# Patient Record
Sex: FEMALE | Race: WHITE | Employment: OTHER | URBAN - METROPOLITAN AREA
[De-identification: names, ages, dates, MRNs, and addresses within clinical notes are randomized per-mention and may not be internally consistent; named-entity substitution may affect disease eponyms.]

---

## 2017-01-17 ENCOUNTER — ANTI-COAG VISIT (OUTPATIENT)
Dept: PHARMACY | Age: 72
End: 2017-01-17

## 2017-01-17 DIAGNOSIS — Z79.01 CHRONIC ANTICOAGULATION: ICD-10-CM

## 2017-01-17 DIAGNOSIS — I48.91 ATRIAL FIBRILLATION, UNSPECIFIED TYPE (HCC): ICD-10-CM

## 2017-01-17 LAB — INR BLD: 2.2

## 2017-01-18 ENCOUNTER — NURSE ONLY (OUTPATIENT)
Dept: CARDIOLOGY CLINIC | Age: 72
End: 2017-01-18

## 2017-01-18 DIAGNOSIS — I50.22 SYSTOLIC CHF, CHRONIC (HCC): Chronic | ICD-10-CM

## 2017-01-18 DIAGNOSIS — Z95.810 AUTOMATIC IMPLANTABLE CARDIOVERTER-DEFIBRILLATOR IN SITU: ICD-10-CM

## 2017-01-18 PROCEDURE — 93297 REM INTERROG DEV EVAL ICPMS: CPT | Performed by: INTERNAL MEDICINE

## 2017-01-18 PROCEDURE — 93296 REM INTERROG EVL PM/IDS: CPT | Performed by: INTERNAL MEDICINE

## 2017-01-18 PROCEDURE — 93295 DEV INTERROG REMOTE 1/2/MLT: CPT | Performed by: INTERNAL MEDICINE

## 2017-02-21 ENCOUNTER — OFFICE VISIT (OUTPATIENT)
Dept: NEUROLOGY | Age: 72
End: 2017-02-21

## 2017-02-21 VITALS
BODY MASS INDEX: 31.86 KG/M2 | HEART RATE: 76 BPM | WEIGHT: 203 LBS | HEIGHT: 67 IN | DIASTOLIC BLOOD PRESSURE: 76 MMHG | SYSTOLIC BLOOD PRESSURE: 129 MMHG

## 2017-02-21 DIAGNOSIS — G40.209 PARTIAL SYMPTOMATIC EPILEPSY WITH COMPLEX PARTIAL SEIZURES, NOT INTRACTABLE, WITHOUT STATUS EPILEPTICUS (HCC): Primary | ICD-10-CM

## 2017-02-21 DIAGNOSIS — I69.354 HEMIPARESIS AFFECTING LEFT SIDE AS LATE EFFECT OF CEREBROVASCULAR ACCIDENT (HCC): ICD-10-CM

## 2017-02-21 DIAGNOSIS — G47.33 OBSTRUCTIVE SLEEP APNEA: ICD-10-CM

## 2017-02-21 PROCEDURE — 99213 OFFICE O/P EST LOW 20 MIN: CPT | Performed by: PSYCHIATRY & NEUROLOGY

## 2017-02-21 RX ORDER — DIVALPROEX SODIUM 500 MG/1
TABLET, EXTENDED RELEASE ORAL
Qty: 90 TABLET | Refills: 1 | Status: SHIPPED | OUTPATIENT
Start: 2017-02-21 | End: 2017-08-22 | Stop reason: SDUPTHER

## 2017-02-28 ENCOUNTER — ANTI-COAG VISIT (OUTPATIENT)
Dept: PHARMACY | Age: 72
End: 2017-02-28

## 2017-02-28 DIAGNOSIS — Z79.01 CHRONIC ANTICOAGULATION: ICD-10-CM

## 2017-02-28 DIAGNOSIS — I48.91 ATRIAL FIBRILLATION, UNSPECIFIED TYPE (HCC): ICD-10-CM

## 2017-02-28 LAB — INR BLD: 3.2

## 2017-03-14 ENCOUNTER — OFFICE VISIT (OUTPATIENT)
Dept: SLEEP MEDICINE | Age: 72
End: 2017-03-14

## 2017-03-14 VITALS
HEIGHT: 67 IN | OXYGEN SATURATION: 97 % | DIASTOLIC BLOOD PRESSURE: 70 MMHG | SYSTOLIC BLOOD PRESSURE: 114 MMHG | HEART RATE: 78 BPM

## 2017-03-14 DIAGNOSIS — I42.9 CARDIOMYOPATHY, IDIOPATHIC (HCC): Chronic | ICD-10-CM

## 2017-03-14 DIAGNOSIS — E66.9 NON MORBID OBESITY, UNSPECIFIED OBESITY TYPE: Chronic | ICD-10-CM

## 2017-03-14 DIAGNOSIS — G47.33 OBSTRUCTIVE SLEEP APNEA (ADULT) (PEDIATRIC): Primary | Chronic | ICD-10-CM

## 2017-03-14 DIAGNOSIS — F32.A DEPRESSION, UNSPECIFIED DEPRESSION TYPE: Chronic | ICD-10-CM

## 2017-03-14 DIAGNOSIS — G40.209 PARTIAL EPILEPSY WITH IMPAIRMENT OF CONSCIOUSNESS (HCC): Chronic | ICD-10-CM

## 2017-03-14 DIAGNOSIS — I50.22 SYSTOLIC CHF, CHRONIC (HCC): Chronic | ICD-10-CM

## 2017-03-14 PROCEDURE — 99214 OFFICE O/P EST MOD 30 MIN: CPT | Performed by: NURSE PRACTITIONER

## 2017-03-14 ASSESSMENT — ENCOUNTER SYMPTOMS
COUGH: 0
ABDOMINAL PAIN: 0
ABDOMINAL DISTENTION: 0
SINUS PRESSURE: 0
RHINORRHEA: 0
SHORTNESS OF BREATH: 0
APNEA: 0

## 2017-03-14 ASSESSMENT — SLEEP AND FATIGUE QUESTIONNAIRES
HOW LIKELY ARE YOU TO NOD OFF OR FALL ASLEEP IN A CAR, WHILE STOPPED FOR A FEW MINUTES IN TRAFFIC: 0
HOW LIKELY ARE YOU TO NOD OFF OR FALL ASLEEP WHILE SITTING AND TALKING TO SOMEONE: 0
HOW LIKELY ARE YOU TO NOD OFF OR FALL ASLEEP WHILE SITTING INACTIVE IN A PUBLIC PLACE: 0
HOW LIKELY ARE YOU TO NOD OFF OR FALL ASLEEP WHEN YOU ARE A PASSENGER IN A CAR FOR AN HOUR WITHOUT A BREAK: 0
HOW LIKELY ARE YOU TO NOD OFF OR FALL ASLEEP WHILE SITTING QUIETLY AFTER LUNCH WITHOUT ALCOHOL: 0
ESS TOTAL SCORE: 2
HOW LIKELY ARE YOU TO NOD OFF OR FALL ASLEEP WHILE LYING DOWN TO REST IN THE AFTERNOON WHEN CIRCUMSTANCES PERMIT: 1
HOW LIKELY ARE YOU TO NOD OFF OR FALL ASLEEP WHILE SITTING AND READING: 0
HOW LIKELY ARE YOU TO NOD OFF OR FALL ASLEEP WHILE WATCHING TV: 1

## 2017-03-23 RX ORDER — CARVEDILOL 25 MG/1
TABLET ORAL
Qty: 135 TABLET | Refills: 3 | Status: SHIPPED | OUTPATIENT
Start: 2017-03-23 | End: 2018-03-18 | Stop reason: SDUPTHER

## 2017-04-10 ENCOUNTER — OFFICE VISIT (OUTPATIENT)
Dept: CARDIOLOGY CLINIC | Age: 72
End: 2017-04-10

## 2017-04-10 ENCOUNTER — HOSPITAL ENCOUNTER (OUTPATIENT)
Dept: OTHER | Age: 72
Discharge: HOME | End: 2017-04-10
Attending: INTERNAL MEDICINE | Admitting: INTERNAL MEDICINE

## 2017-04-10 ENCOUNTER — ANTI-COAG VISIT (OUTPATIENT)
Dept: PHARMACY | Age: 72
End: 2017-04-10

## 2017-04-10 VITALS
SYSTOLIC BLOOD PRESSURE: 100 MMHG | HEART RATE: 72 BPM | HEIGHT: 67 IN | BODY MASS INDEX: 32.49 KG/M2 | WEIGHT: 207 LBS | DIASTOLIC BLOOD PRESSURE: 64 MMHG

## 2017-04-10 DIAGNOSIS — I50.22 SYSTOLIC CHF, CHRONIC (HCC): Chronic | ICD-10-CM

## 2017-04-10 DIAGNOSIS — Z95.810 AUTOMATIC IMPLANTABLE CARDIOVERTER-DEFIBRILLATOR IN SITU: Chronic | ICD-10-CM

## 2017-04-10 DIAGNOSIS — Z79.01 CHRONIC ANTICOAGULATION: ICD-10-CM

## 2017-04-10 DIAGNOSIS — I42.9 CARDIOMYOPATHY, IDIOPATHIC (HCC): Chronic | ICD-10-CM

## 2017-04-10 DIAGNOSIS — I50.22 SYSTOLIC CHF, CHRONIC (HCC): Primary | Chronic | ICD-10-CM

## 2017-04-10 LAB
ANION GAP SERPL CALCULATED.3IONS-SCNC: 17 MMOL/L (ref 3–16)
BASOPHILS ABSOLUTE: 0 K/UL (ref 0–0.2)
BASOPHILS RELATIVE PERCENT: 0.4 %
BUN BLDV-MCNC: 13 MG/DL (ref 7–20)
CALCIUM SERPL-MCNC: 9.8 MG/DL (ref 8.3–10.6)
CHLORIDE BLD-SCNC: 100 MMOL/L (ref 99–110)
CO2: 22 MMOL/L (ref 21–32)
CREAT SERPL-MCNC: 0.8 MG/DL (ref 0.6–1.2)
EOSINOPHILS ABSOLUTE: 0.1 K/UL (ref 0–0.6)
EOSINOPHILS RELATIVE PERCENT: 1.3 %
GFR AFRICAN AMERICAN: >60
GFR NON-AFRICAN AMERICAN: >60
GLUCOSE BLD-MCNC: 92 MG/DL (ref 70–99)
HCT VFR BLD CALC: 43 % (ref 36–48)
HEMOGLOBIN: 14.7 G/DL (ref 12–16)
INR BLD: 4.1
INR BLD: 4.1
LYMPHOCYTES ABSOLUTE: 3 K/UL (ref 1–5.1)
LYMPHOCYTES RELATIVE PERCENT: 36.9 %
MCH RBC QN AUTO: 35.6 PG (ref 26–34)
MCHC RBC AUTO-ENTMCNC: 34.2 G/DL (ref 31–36)
MCV RBC AUTO: 104.1 FL (ref 80–100)
MONOCYTES ABSOLUTE: 1.1 K/UL (ref 0–1.3)
MONOCYTES RELATIVE PERCENT: 13.7 %
NEUTROPHILS ABSOLUTE: 3.8 K/UL (ref 1.7–7.7)
NEUTROPHILS RELATIVE PERCENT: 47.7 %
PDW BLD-RTO: 13.2 % (ref 12.4–15.4)
PLATELET # BLD: 182 K/UL (ref 135–450)
PMV BLD AUTO: 8.6 FL (ref 5–10.5)
POTASSIUM SERPL-SCNC: 4.6 MMOL/L (ref 3.5–5.1)
PRO-BNP: 717 PG/ML (ref 0–124)
RBC # BLD: 4.13 M/UL (ref 4–5.2)
SODIUM BLD-SCNC: 139 MMOL/L (ref 136–145)
WBC # BLD: 8.1 K/UL (ref 4–11)

## 2017-04-10 PROCEDURE — 99214 OFFICE O/P EST MOD 30 MIN: CPT | Performed by: INTERNAL MEDICINE

## 2017-04-10 PROCEDURE — 93290 INTERROG DEV EVAL ICPMS IP: CPT | Performed by: INTERNAL MEDICINE

## 2017-04-19 ENCOUNTER — NURSE ONLY (OUTPATIENT)
Dept: CARDIOLOGY CLINIC | Age: 72
End: 2017-04-19

## 2017-04-19 DIAGNOSIS — I50.22 SYSTOLIC CHF, CHRONIC (HCC): Chronic | ICD-10-CM

## 2017-04-19 DIAGNOSIS — I42.9 CARDIOMYOPATHY, IDIOPATHIC (HCC): Chronic | ICD-10-CM

## 2017-04-19 DIAGNOSIS — Z95.810 AUTOMATIC IMPLANTABLE CARDIOVERTER-DEFIBRILLATOR IN SITU: Chronic | ICD-10-CM

## 2017-04-19 PROCEDURE — 93296 REM INTERROG EVL PM/IDS: CPT | Performed by: INTERNAL MEDICINE

## 2017-04-19 PROCEDURE — 93297 REM INTERROG DEV EVAL ICPMS: CPT | Performed by: INTERNAL MEDICINE

## 2017-04-19 PROCEDURE — 93295 DEV INTERROG REMOTE 1/2/MLT: CPT | Performed by: INTERNAL MEDICINE

## 2017-04-24 ENCOUNTER — ANTI-COAG VISIT (OUTPATIENT)
Dept: PHARMACY | Age: 72
End: 2017-04-24

## 2017-04-24 DIAGNOSIS — I48.91 ATRIAL FIBRILLATION, UNSPECIFIED TYPE (HCC): ICD-10-CM

## 2017-04-24 DIAGNOSIS — Z79.01 CHRONIC ANTICOAGULATION: ICD-10-CM

## 2017-04-24 DIAGNOSIS — I63.9 CEREBRAL INFARCTION, UNSPECIFIED MECHANISM (HCC): ICD-10-CM

## 2017-04-24 LAB
INR BLD: 2.7
PROTIME: 32.9 SECONDS

## 2017-05-22 ENCOUNTER — ANTI-COAG VISIT (OUTPATIENT)
Dept: PHARMACY | Age: 72
End: 2017-05-22

## 2017-05-22 DIAGNOSIS — I63.9 CEREBRAL INFARCTION, UNSPECIFIED MECHANISM (HCC): ICD-10-CM

## 2017-05-22 DIAGNOSIS — Z79.01 CHRONIC ANTICOAGULATION: ICD-10-CM

## 2017-05-22 DIAGNOSIS — I48.91 ATRIAL FIBRILLATION, UNSPECIFIED TYPE (HCC): ICD-10-CM

## 2017-05-22 LAB
INR BLD: 3.2
PROTIME: 38.2 SECONDS

## 2017-06-05 ENCOUNTER — ANTI-COAG VISIT (OUTPATIENT)
Dept: PHARMACY | Age: 72
End: 2017-06-05

## 2017-06-05 DIAGNOSIS — I63.9 CEREBRAL INFARCTION, UNSPECIFIED MECHANISM (HCC): ICD-10-CM

## 2017-06-05 DIAGNOSIS — I48.91 ATRIAL FIBRILLATION, UNSPECIFIED TYPE (HCC): ICD-10-CM

## 2017-06-05 DIAGNOSIS — Z79.01 CHRONIC ANTICOAGULATION: ICD-10-CM

## 2017-06-05 LAB — INR BLD: 2.4

## 2017-07-03 ENCOUNTER — ANTI-COAG VISIT (OUTPATIENT)
Dept: PHARMACY | Age: 72
End: 2017-07-03

## 2017-07-03 DIAGNOSIS — Z79.01 CHRONIC ANTICOAGULATION: ICD-10-CM

## 2017-07-03 LAB — INR BLD: 1.9

## 2017-07-21 ENCOUNTER — TELEPHONE (OUTPATIENT)
Dept: PHARMACY | Age: 72
End: 2017-07-21

## 2017-07-24 ENCOUNTER — ANTI-COAG VISIT (OUTPATIENT)
Dept: PHARMACY | Age: 72
End: 2017-07-24

## 2017-07-24 DIAGNOSIS — Z79.01 CHRONIC ANTICOAGULATION: ICD-10-CM

## 2017-07-24 LAB — INR BLD: 2

## 2017-07-26 ENCOUNTER — NURSE ONLY (OUTPATIENT)
Dept: CARDIOLOGY CLINIC | Age: 72
End: 2017-07-26

## 2017-07-26 DIAGNOSIS — I50.22 SYSTOLIC CHF, CHRONIC (HCC): Chronic | ICD-10-CM

## 2017-07-26 DIAGNOSIS — I42.9 CARDIOMYOPATHY, IDIOPATHIC (HCC): Chronic | ICD-10-CM

## 2017-07-26 DIAGNOSIS — Z95.810 AUTOMATIC IMPLANTABLE CARDIOVERTER-DEFIBRILLATOR IN SITU: Chronic | ICD-10-CM

## 2017-07-26 PROCEDURE — 93295 DEV INTERROG REMOTE 1/2/MLT: CPT | Performed by: INTERNAL MEDICINE

## 2017-07-26 PROCEDURE — 93297 REM INTERROG DEV EVAL ICPMS: CPT | Performed by: INTERNAL MEDICINE

## 2017-07-26 PROCEDURE — 93296 REM INTERROG EVL PM/IDS: CPT | Performed by: INTERNAL MEDICINE

## 2017-08-10 ENCOUNTER — OFFICE VISIT (OUTPATIENT)
Dept: CARDIOLOGY CLINIC | Age: 72
End: 2017-08-10

## 2017-08-10 ENCOUNTER — HOSPITAL ENCOUNTER (OUTPATIENT)
Dept: OTHER | Age: 72
Discharge: HOME | End: 2017-08-10
Attending: INTERNAL MEDICINE | Admitting: INTERNAL MEDICINE

## 2017-08-10 VITALS
WEIGHT: 207 LBS | SYSTOLIC BLOOD PRESSURE: 120 MMHG | HEART RATE: 76 BPM | BODY MASS INDEX: 32.49 KG/M2 | HEIGHT: 67 IN | DIASTOLIC BLOOD PRESSURE: 86 MMHG

## 2017-08-10 DIAGNOSIS — E55.9 VITAMIN D INSUFFICIENCY: ICD-10-CM

## 2017-08-10 DIAGNOSIS — Z95.810 AUTOMATIC IMPLANTABLE CARDIOVERTER-DEFIBRILLATOR IN SITU: Chronic | ICD-10-CM

## 2017-08-10 DIAGNOSIS — I50.22 SYSTOLIC CHF, CHRONIC (HCC): Primary | Chronic | ICD-10-CM

## 2017-08-10 LAB
BACTERIA: ABNORMAL /HPF
BILIRUBIN URINE: NEGATIVE
BLOOD, URINE: ABNORMAL
CLARITY: ABNORMAL
COLOR: YELLOW
EPITHELIAL CELLS, UA: 1 /HPF (ref 0–5)
GLUCOSE URINE: NEGATIVE MG/DL
HYALINE CASTS: 3 /LPF (ref 0–8)
KETONES, URINE: NEGATIVE MG/DL
LEUKOCYTE ESTERASE, URINE: ABNORMAL
MICROSCOPIC EXAMINATION: YES
NITRITE, URINE: NEGATIVE
PH UA: 5
PROTEIN UA: NEGATIVE MG/DL
RBC UA: 12 /HPF (ref 0–4)
SPECIFIC GRAVITY UA: 1.02
URINE TYPE: ABNORMAL
UROBILINOGEN, URINE: 0.2 E.U./DL
WBC UA: 45 /HPF (ref 0–5)

## 2017-08-10 PROCEDURE — 93290 INTERROG DEV EVAL ICPMS IP: CPT | Performed by: INTERNAL MEDICINE

## 2017-08-10 PROCEDURE — 99214 OFFICE O/P EST MOD 30 MIN: CPT | Performed by: INTERNAL MEDICINE

## 2017-08-12 LAB
ORGANISM: ABNORMAL
URINE CULTURE, ROUTINE: ABNORMAL

## 2017-08-16 ENCOUNTER — TELEPHONE (OUTPATIENT)
Dept: CARDIOLOGY CLINIC | Age: 72
End: 2017-08-16

## 2017-08-16 RX ORDER — SULFAMETHOXAZOLE AND TRIMETHOPRIM 800; 160 MG/1; MG/1
1 TABLET ORAL 2 TIMES DAILY
COMMUNITY
End: 2017-08-16 | Stop reason: SDUPTHER

## 2017-08-16 RX ORDER — SULFAMETHOXAZOLE AND TRIMETHOPRIM 800; 160 MG/1; MG/1
1 TABLET ORAL 2 TIMES DAILY
Qty: 14 TABLET | Refills: 0 | Status: SHIPPED | OUTPATIENT
Start: 2017-08-16 | End: 2017-08-25 | Stop reason: ALTCHOICE

## 2017-08-21 ENCOUNTER — HOSPITAL ENCOUNTER (OUTPATIENT)
Dept: OTHER | Age: 72
Discharge: HOME | End: 2017-08-21
Attending: INTERNAL MEDICINE | Admitting: INTERNAL MEDICINE

## 2017-08-21 ENCOUNTER — ANTI-COAG VISIT (OUTPATIENT)
Dept: PHARMACY | Age: 72
End: 2017-08-21

## 2017-08-21 DIAGNOSIS — Z79.01 CHRONIC ANTICOAGULATION: ICD-10-CM

## 2017-08-21 DIAGNOSIS — Z95.810 AUTOMATIC IMPLANTABLE CARDIOVERTER-DEFIBRILLATOR IN SITU: Chronic | ICD-10-CM

## 2017-08-21 DIAGNOSIS — I48.91 ATRIAL FIBRILLATION, UNSPECIFIED TYPE (HCC): ICD-10-CM

## 2017-08-21 DIAGNOSIS — E55.9 VITAMIN D INSUFFICIENCY: ICD-10-CM

## 2017-08-21 DIAGNOSIS — I50.22 SYSTOLIC CHF, CHRONIC (HCC): Chronic | ICD-10-CM

## 2017-08-21 LAB
A/G RATIO: 1.4 (ref 1.1–2.2)
ALBUMIN SERPL-MCNC: 4.2 G/DL (ref 3.4–5)
ALP BLD-CCNC: 74 U/L (ref 40–129)
ALT SERPL-CCNC: 26 U/L (ref 10–40)
ANION GAP SERPL CALCULATED.3IONS-SCNC: 16 MMOL/L (ref 3–16)
AST SERPL-CCNC: 28 U/L (ref 15–37)
BASOPHILS ABSOLUTE: 0.1 K/UL (ref 0–0.2)
BASOPHILS RELATIVE PERCENT: 1 %
BILIRUB SERPL-MCNC: 0.4 MG/DL (ref 0–1)
BUN BLDV-MCNC: 13 MG/DL (ref 7–20)
CALCIUM SERPL-MCNC: 9.6 MG/DL (ref 8.3–10.6)
CHLORIDE BLD-SCNC: 100 MMOL/L (ref 99–110)
CO2: 20 MMOL/L (ref 21–32)
CREAT SERPL-MCNC: 1 MG/DL (ref 0.6–1.2)
EOSINOPHILS ABSOLUTE: 0.1 K/UL (ref 0–0.6)
EOSINOPHILS RELATIVE PERCENT: 1.3 %
GFR AFRICAN AMERICAN: >60
GFR NON-AFRICAN AMERICAN: 54
GLOBULIN: 2.9 G/DL
GLUCOSE BLD-MCNC: 179 MG/DL (ref 70–99)
HCT VFR BLD CALC: 43.6 % (ref 36–48)
HEMOGLOBIN: 14.9 G/DL (ref 12–16)
INR BLD: 2.5
LYMPHOCYTES ABSOLUTE: 2.3 K/UL (ref 1–5.1)
LYMPHOCYTES RELATIVE PERCENT: 32.1 %
MCH RBC QN AUTO: 35.7 PG (ref 26–34)
MCHC RBC AUTO-ENTMCNC: 34.1 G/DL (ref 31–36)
MCV RBC AUTO: 104.6 FL (ref 80–100)
MONOCYTES ABSOLUTE: 0.7 K/UL (ref 0–1.3)
MONOCYTES RELATIVE PERCENT: 10.1 %
NEUTROPHILS ABSOLUTE: 4 K/UL (ref 1.7–7.7)
NEUTROPHILS RELATIVE PERCENT: 55.5 %
PDW BLD-RTO: 13.8 % (ref 12.4–15.4)
PLATELET # BLD: 174 K/UL (ref 135–450)
PMV BLD AUTO: 8.5 FL (ref 5–10.5)
POTASSIUM SERPL-SCNC: 4.9 MMOL/L (ref 3.5–5.1)
PRO-BNP: 521 PG/ML (ref 0–449)
PROTIME: 29.4 SECONDS
RBC # BLD: 4.17 M/UL (ref 4–5.2)
SODIUM BLD-SCNC: 136 MMOL/L (ref 136–145)
TOTAL PROTEIN: 7.1 G/DL (ref 6.4–8.2)
VITAMIN D 25-HYDROXY: 23.7 NG/ML
WBC # BLD: 7.2 K/UL (ref 4–11)

## 2017-08-22 ENCOUNTER — OFFICE VISIT (OUTPATIENT)
Dept: NEUROLOGY | Age: 72
End: 2017-08-22

## 2017-08-22 VITALS
WEIGHT: 204 LBS | SYSTOLIC BLOOD PRESSURE: 109 MMHG | HEIGHT: 67 IN | HEART RATE: 74 BPM | BODY MASS INDEX: 32.02 KG/M2 | DIASTOLIC BLOOD PRESSURE: 68 MMHG

## 2017-08-22 DIAGNOSIS — G40.209 PARTIAL SYMPTOMATIC EPILEPSY WITH COMPLEX PARTIAL SEIZURES, NOT INTRACTABLE, WITHOUT STATUS EPILEPTICUS (HCC): ICD-10-CM

## 2017-08-22 PROCEDURE — 99213 OFFICE O/P EST LOW 20 MIN: CPT | Performed by: PSYCHIATRY & NEUROLOGY

## 2017-08-22 RX ORDER — DIVALPROEX SODIUM 500 MG/1
TABLET, EXTENDED RELEASE ORAL
Qty: 90 TABLET | Refills: 1 | Status: SHIPPED | OUTPATIENT
Start: 2017-08-22 | End: 2018-02-19 | Stop reason: SDUPTHER

## 2017-08-25 ENCOUNTER — OFFICE VISIT (OUTPATIENT)
Dept: CARDIOLOGY CLINIC | Age: 72
End: 2017-08-25

## 2017-08-25 VITALS
BODY MASS INDEX: 31.36 KG/M2 | WEIGHT: 199.8 LBS | SYSTOLIC BLOOD PRESSURE: 102 MMHG | RESPIRATION RATE: 16 BRPM | HEIGHT: 67 IN | DIASTOLIC BLOOD PRESSURE: 64 MMHG | OXYGEN SATURATION: 96 % | HEART RATE: 74 BPM

## 2017-08-25 DIAGNOSIS — I42.8 NICM (NONISCHEMIC CARDIOMYOPATHY) (HCC): Chronic | ICD-10-CM

## 2017-08-25 DIAGNOSIS — G47.33 OBSTRUCTIVE APNEA: Chronic | ICD-10-CM

## 2017-08-25 DIAGNOSIS — Z95.810 AUTOMATIC IMPLANTABLE CARDIOVERTER-DEFIBRILLATOR IN SITU: Chronic | ICD-10-CM

## 2017-08-25 DIAGNOSIS — I48.20 CHRONIC ATRIAL FIBRILLATION (HCC): Chronic | ICD-10-CM

## 2017-08-25 DIAGNOSIS — I50.22 SYSTOLIC CHF, CHRONIC (HCC): Primary | Chronic | ICD-10-CM

## 2017-08-25 PROCEDURE — 99214 OFFICE O/P EST MOD 30 MIN: CPT | Performed by: NURSE PRACTITIONER

## 2017-08-25 PROCEDURE — 93290 INTERROG DEV EVAL ICPMS IP: CPT | Performed by: NURSE PRACTITIONER

## 2017-09-15 RX ORDER — VENLAFAXINE 37.5 MG/1
TABLET ORAL
Qty: 90 TABLET | Refills: 3 | Status: SHIPPED | OUTPATIENT
Start: 2017-09-15 | End: 2018-09-10 | Stop reason: SDUPTHER

## 2017-09-18 ENCOUNTER — ANTI-COAG VISIT (OUTPATIENT)
Dept: PHARMACY | Age: 72
End: 2017-09-18

## 2017-09-18 DIAGNOSIS — Z79.01 CHRONIC ANTICOAGULATION: ICD-10-CM

## 2017-09-18 LAB
INR BLD: 2.1
PROTIME: 24.8 SECONDS

## 2017-09-19 ENCOUNTER — OFFICE VISIT (OUTPATIENT)
Dept: CARDIOLOGY CLINIC | Age: 72
End: 2017-09-19

## 2017-09-19 VITALS
HEIGHT: 67 IN | HEART RATE: 74 BPM | OXYGEN SATURATION: 95 % | RESPIRATION RATE: 16 BRPM | WEIGHT: 207.4 LBS | BODY MASS INDEX: 32.55 KG/M2 | DIASTOLIC BLOOD PRESSURE: 60 MMHG | SYSTOLIC BLOOD PRESSURE: 100 MMHG

## 2017-09-19 DIAGNOSIS — I48.20 CHRONIC ATRIAL FIBRILLATION (HCC): ICD-10-CM

## 2017-09-19 DIAGNOSIS — I42.8 NICM (NONISCHEMIC CARDIOMYOPATHY) (HCC): ICD-10-CM

## 2017-09-19 DIAGNOSIS — I50.22 SYSTOLIC CHF, CHRONIC (HCC): Primary | Chronic | ICD-10-CM

## 2017-09-19 DIAGNOSIS — Z95.810 AUTOMATIC IMPLANTABLE CARDIOVERTER-DEFIBRILLATOR IN SITU: Chronic | ICD-10-CM

## 2017-09-19 DIAGNOSIS — G47.33 OSA (OBSTRUCTIVE SLEEP APNEA): ICD-10-CM

## 2017-09-19 PROCEDURE — 99214 OFFICE O/P EST MOD 30 MIN: CPT | Performed by: NURSE PRACTITIONER

## 2017-09-19 PROCEDURE — 93290 INTERROG DEV EVAL ICPMS IP: CPT | Performed by: NURSE PRACTITIONER

## 2017-09-26 ENCOUNTER — HOSPITAL ENCOUNTER (OUTPATIENT)
Dept: OTHER | Age: 72
Discharge: HOME | End: 2017-09-26
Attending: NURSE PRACTITIONER | Admitting: NURSE PRACTITIONER

## 2017-09-26 DIAGNOSIS — Z95.810 AUTOMATIC IMPLANTABLE CARDIOVERTER-DEFIBRILLATOR IN SITU: Chronic | ICD-10-CM

## 2017-09-26 DIAGNOSIS — I50.22 SYSTOLIC CHF, CHRONIC (HCC): Chronic | ICD-10-CM

## 2017-09-26 LAB
ANION GAP SERPL CALCULATED.3IONS-SCNC: 20 MMOL/L (ref 3–16)
BUN BLDV-MCNC: 14 MG/DL (ref 7–20)
CALCIUM SERPL-MCNC: 10 MG/DL (ref 8.3–10.6)
CHLORIDE BLD-SCNC: 95 MMOL/L (ref 99–110)
CO2: 21 MMOL/L (ref 21–32)
CREAT SERPL-MCNC: 0.8 MG/DL (ref 0.6–1.2)
GFR AFRICAN AMERICAN: >60
GFR NON-AFRICAN AMERICAN: >60
GLUCOSE BLD-MCNC: 148 MG/DL (ref 70–99)
POTASSIUM SERPL-SCNC: 4.4 MMOL/L (ref 3.5–5.1)
PRO-BNP: 720 PG/ML (ref 0–449)
SODIUM BLD-SCNC: 136 MMOL/L (ref 136–145)

## 2017-09-27 ENCOUNTER — TELEPHONE (OUTPATIENT)
Dept: CARDIOLOGY CLINIC | Age: 72
End: 2017-09-27

## 2017-09-27 DIAGNOSIS — I42.8 NICM (NONISCHEMIC CARDIOMYOPATHY) (HCC): Primary | Chronic | ICD-10-CM

## 2017-10-06 ENCOUNTER — HOSPITAL ENCOUNTER (OUTPATIENT)
Dept: OTHER | Age: 72
Discharge: HOME | End: 2017-10-06
Attending: NURSE PRACTITIONER | Admitting: NURSE PRACTITIONER

## 2017-10-06 DIAGNOSIS — I42.8 NICM (NONISCHEMIC CARDIOMYOPATHY) (HCC): Chronic | ICD-10-CM

## 2017-10-06 LAB
ANION GAP SERPL CALCULATED.3IONS-SCNC: 16 MMOL/L (ref 3–16)
BUN BLDV-MCNC: 11 MG/DL (ref 7–20)
CALCIUM SERPL-MCNC: 10.1 MG/DL (ref 8.3–10.6)
CHLORIDE BLD-SCNC: 100 MMOL/L (ref 99–110)
CO2: 24 MMOL/L (ref 21–32)
CREAT SERPL-MCNC: 0.8 MG/DL (ref 0.6–1.2)
GFR AFRICAN AMERICAN: >60
GFR NON-AFRICAN AMERICAN: >60
GLUCOSE BLD-MCNC: 146 MG/DL (ref 70–99)
POTASSIUM SERPL-SCNC: 4.7 MMOL/L (ref 3.5–5.1)
PRO-BNP: 627 PG/ML (ref 0–449)
SODIUM BLD-SCNC: 140 MMOL/L (ref 136–145)

## 2017-10-09 ENCOUNTER — TELEPHONE (OUTPATIENT)
Dept: CARDIOLOGY CLINIC | Age: 72
End: 2017-10-09

## 2017-10-09 NOTE — TELEPHONE ENCOUNTER
Son needs help filling out paperwork for [facility] and also needs a copy of her benefit card .  Please call son , Alejandro Shoulders

## 2017-10-09 NOTE — TELEPHONE ENCOUNTER
Notes Recorded by Onesimo Cronin CNP on 10/7/2017 at 7:45 PM EDT  Notify patient labs look good, kidney function and potassium are stable. BNP for fluid up slightly suggesting she is retaining fluid. If she is still experiencing shortness of breath, instruct her to increase Lasix to 3 days a week (ie: Mon-Wed-Fri). Thank you. Patient informed. States no longer sob. Instructed on lasix increase to 3 days a week due to her fluid level being up and her response was,\"now I'll have to stay home three days a week\".

## 2017-10-10 ENCOUNTER — TELEPHONE (OUTPATIENT)
Dept: CARDIOLOGY CLINIC | Age: 72
End: 2017-10-10

## 2017-10-10 NOTE — TELEPHONE ENCOUNTER
We will complete what we can and fax to [employer] once we receive faxed form from her son, Sam Soliz.     Please see phone encounter of 10/11/17 for further information

## 2017-10-10 NOTE — TELEPHONE ENCOUNTER
Son of pt called to adv needs assistance with filling out forms in regards [employer] assistance program. Financial aid for co-pays. There is a provider section which needs to be filled. Will be faxing over.   Pt has lost her insurance card and is asking if we can provide a copy of that.       Please call to adv and when filled out if we can fax to:  [phone]

## 2017-10-11 ENCOUNTER — TELEPHONE (OUTPATIENT)
Dept: CARDIOLOGY CLINIC | Age: 72
End: 2017-10-11

## 2017-10-11 DIAGNOSIS — Z95.810 AUTOMATIC IMPLANTABLE CARDIOVERTER-DEFIBRILLATOR IN SITU: Chronic | ICD-10-CM

## 2017-10-11 DIAGNOSIS — I50.22 SYSTOLIC CHF, CHRONIC (HCC): Chronic | ICD-10-CM

## 2017-10-11 NOTE — TELEPHONE ENCOUNTER
Pt called to adv that he is filling out form for [facility] and it is asking for a copy of the original prescription. He would like to know if it can be faxed to him at [phone]. Please call son of pt to adv once faxed so that he can go to fax machine.  Thank you

## 2017-10-16 ENCOUNTER — ANTI-COAG VISIT (OUTPATIENT)
Dept: PHARMACY | Age: 72
End: 2017-10-16

## 2017-10-16 DIAGNOSIS — I48.20 CHRONIC ATRIAL FIBRILLATION (HCC): ICD-10-CM

## 2017-10-16 DIAGNOSIS — Z79.01 CHRONIC ANTICOAGULATION: ICD-10-CM

## 2017-10-16 LAB
INR BLD: 2.7
PROTIME: 32.1 SECONDS

## 2017-10-20 ENCOUNTER — TELEPHONE (OUTPATIENT)
Dept: PHARMACY | Age: 72
End: 2017-10-20

## 2017-10-20 NOTE — TELEPHONE ENCOUNTER
Patient began taking Cipro today and will complete 10/30. Instructed patient to take 1.25 mg (1/2 tablet) tomorrow then return to weekly dose of 1.25 Mon, Wed, Fri and 2.5 mg all other days  Recheck INR 11/6 after completing abx then can schedule 6 weeks again.

## 2017-11-01 ENCOUNTER — TELEPHONE (OUTPATIENT)
Dept: CARDIOLOGY CLINIC | Age: 72
End: 2017-11-01

## 2017-11-01 ENCOUNTER — HOSPITAL ENCOUNTER (OUTPATIENT)
Dept: OTHER | Age: 72
Discharge: HOME | End: 2017-11-30
Attending: INTERNAL MEDICINE | Admitting: INTERNAL MEDICINE

## 2017-11-01 NOTE — TELEPHONE ENCOUNTER
Put on new expensive medication , Entresto . She isnt getting the financial help on this for about another week but will run out of med in 2 days. Does office have samples to hold her until she gets the letter from the drug company ?

## 2017-11-02 PROCEDURE — 93295 DEV INTERROG REMOTE 1/2/MLT: CPT | Performed by: INTERNAL MEDICINE

## 2017-11-02 PROCEDURE — 93296 REM INTERROG EVL PM/IDS: CPT | Performed by: INTERNAL MEDICINE

## 2017-11-02 PROCEDURE — 93297 REM INTERROG DEV EVAL ICPMS: CPT | Performed by: INTERNAL MEDICINE

## 2017-11-06 ENCOUNTER — ANTI-COAG VISIT (OUTPATIENT)
Dept: PHARMACY | Age: 72
End: 2017-11-06

## 2017-11-06 DIAGNOSIS — Z79.01 CHRONIC ANTICOAGULATION: ICD-10-CM

## 2017-11-06 DIAGNOSIS — I48.20 CHRONIC ATRIAL FIBRILLATION (HCC): ICD-10-CM

## 2017-11-06 LAB
INR BLD: 2.4
PROTIME: 28.6 SECONDS

## 2017-11-06 NOTE — PROGRESS NOTES
Carelink transmission shows normal sensing and pacing function. See interrogation for more details. Optivol is within normal range.

## 2017-11-08 ENCOUNTER — NURSE ONLY (OUTPATIENT)
Dept: CARDIOLOGY CLINIC | Age: 72
End: 2017-11-08

## 2017-11-08 DIAGNOSIS — I42.8 NICM (NONISCHEMIC CARDIOMYOPATHY) (HCC): Chronic | ICD-10-CM

## 2017-11-08 DIAGNOSIS — I50.22 SYSTOLIC CHF, CHRONIC (HCC): Chronic | ICD-10-CM

## 2017-11-08 DIAGNOSIS — Z95.810 AUTOMATIC IMPLANTABLE CARDIOVERTER-DEFIBRILLATOR IN SITU: Chronic | ICD-10-CM

## 2017-11-08 NOTE — LETTER
[address] [facility] [phone]  [address] [facility],[address] [phone]    Pacemaker/Defibrillator Clinic          11/06/17        Lisa Stevens  [address] [facility] [address]        Dear Lisa Stevnes    This letter is to inform you that we received the transmission from your monitor at home that checks your pacemaker and/or defibrillator, or implanted heart monitor. Everything is within normal limits. The next date your monitor will automatically transmit will be 2-13-18. Please do not send additional routine transmissions unless specifically requested. Your device and monitor are wireless and most transmit cellularly, but please periodically check your monitor is still plugged in to the electrical outlet. If you still use the telephone land line to send please ensure the connection to the phone tj is secure. This will help to ensure successful automatic transmissions in the future. Also, the monitor needs to be close to you while sleeping at night. Please be aware that the remote device transmission sites are periodically monitored only during regular business hours during which simultaneous in-office device clinics are being run. If your transmission requires attention, we will contact you as soon as possible. Thank you.             [address]

## 2017-11-29 ENCOUNTER — OFFICE VISIT (OUTPATIENT)
Dept: CARDIOLOGY CLINIC | Age: 72
End: 2017-11-29

## 2017-11-29 VITALS
SYSTOLIC BLOOD PRESSURE: 116 MMHG | RESPIRATION RATE: 16 BRPM | WEIGHT: 204.8 LBS | OXYGEN SATURATION: 95 % | HEART RATE: 63 BPM | BODY MASS INDEX: 32.15 KG/M2 | HEIGHT: 67 IN | DIASTOLIC BLOOD PRESSURE: 74 MMHG

## 2017-11-29 DIAGNOSIS — G47.33 OBSTRUCTIVE APNEA: Chronic | ICD-10-CM

## 2017-11-29 DIAGNOSIS — Z95.810 AUTOMATIC IMPLANTABLE CARDIOVERTER-DEFIBRILLATOR IN SITU: Chronic | ICD-10-CM

## 2017-11-29 DIAGNOSIS — I48.20 CHRONIC ATRIAL FIBRILLATION (HCC): ICD-10-CM

## 2017-11-29 DIAGNOSIS — I42.8 NICM (NONISCHEMIC CARDIOMYOPATHY) (HCC): Chronic | ICD-10-CM

## 2017-11-29 DIAGNOSIS — I50.22 SYSTOLIC CHF, CHRONIC (HCC): Primary | Chronic | ICD-10-CM

## 2017-11-29 PROCEDURE — 93290 INTERROG DEV EVAL ICPMS IP: CPT | Performed by: NURSE PRACTITIONER

## 2017-11-29 PROCEDURE — 99214 OFFICE O/P EST MOD 30 MIN: CPT | Performed by: NURSE PRACTITIONER

## 2017-11-29 RX ORDER — DIGOXIN 125 MCG
TABLET ORAL
Qty: 90 TABLET | Refills: 3 | Status: SHIPPED | OUTPATIENT
Start: 2017-11-29 | End: 2018-11-26 | Stop reason: SDUPTHER

## 2017-11-29 RX ORDER — SIMVASTATIN 20 MG
TABLET ORAL
Qty: 90 TABLET | Refills: 3 | Status: SHIPPED | OUTPATIENT
Start: 2017-11-29 | End: 2018-04-16 | Stop reason: SDUPTHER

## 2017-11-29 NOTE — PATIENT INSTRUCTIONS
1. Continue current medications. 2. Check labs in January (orders in computer). 3. Follow up in 3 months, earlier for worsening.

## 2017-12-01 ENCOUNTER — HOSPITAL ENCOUNTER (OUTPATIENT)
Dept: OTHER | Age: 72
Discharge: HOME | End: 2017-12-31
Attending: INTERNAL MEDICINE | Admitting: INTERNAL MEDICINE

## 2017-12-11 ENCOUNTER — TELEPHONE (OUTPATIENT)
Dept: CARDIOLOGY CLINIC | Age: 72
End: 2017-12-11

## 2017-12-11 DIAGNOSIS — I50.22 SYSTOLIC CHF, CHRONIC (HCC): Chronic | ICD-10-CM

## 2017-12-15 ENCOUNTER — TELEPHONE (OUTPATIENT)
Dept: CARDIOLOGY CLINIC | Age: 72
End: 2017-12-15

## 2017-12-15 NOTE — TELEPHONE ENCOUNTER
Pt would like all FUTURE Rx Entresto scripts to be sent to [facility] not to [facility].  Pls call to advise Thank you

## 2017-12-15 NOTE — TELEPHONE ENCOUNTER
I called the patient and let her know that when the next refill is due, to please call us and let us know and please specify which [facility] to send it to as there is no way for me to specify that next to medication in her record. She will do this.

## 2017-12-18 ENCOUNTER — ANTI-COAG VISIT (OUTPATIENT)
Dept: PHARMACY | Age: 72
End: 2017-12-18

## 2017-12-18 DIAGNOSIS — I48.20 CHRONIC ATRIAL FIBRILLATION (HCC): ICD-10-CM

## 2017-12-18 DIAGNOSIS — Z79.01 CHRONIC ANTICOAGULATION: ICD-10-CM

## 2017-12-18 LAB
INR BLD: 3.1
PROTIME: 37.1 SECONDS

## 2018-01-01 ENCOUNTER — HOSPITAL ENCOUNTER (OUTPATIENT)
Dept: OTHER | Age: 73
Discharge: HOME | End: 2018-01-31
Attending: INTERNAL MEDICINE | Admitting: INTERNAL MEDICINE

## 2018-01-09 ENCOUNTER — TELEPHONE (OUTPATIENT)
Dept: CARDIOLOGY CLINIC | Age: 73
End: 2018-01-09

## 2018-01-09 ENCOUNTER — HOSPITAL ENCOUNTER (OUTPATIENT)
Dept: OTHER | Age: 73
Discharge: HOME | End: 2018-01-09
Attending: NURSE PRACTITIONER | Admitting: NURSE PRACTITIONER

## 2018-01-09 DIAGNOSIS — I50.22 SYSTOLIC CHF, CHRONIC (HCC): Primary | Chronic | ICD-10-CM

## 2018-01-09 DIAGNOSIS — I50.22 SYSTOLIC CHF, CHRONIC (HCC): Chronic | ICD-10-CM

## 2018-01-09 LAB
ANION GAP SERPL CALCULATED.3IONS-SCNC: 17 MMOL/L (ref 3–16)
BUN BLDV-MCNC: 17 MG/DL (ref 7–20)
CALCIUM SERPL-MCNC: 9.7 MG/DL (ref 8.3–10.6)
CHLORIDE BLD-SCNC: 99 MMOL/L (ref 99–110)
CO2: 22 MMOL/L (ref 21–32)
CREAT SERPL-MCNC: 0.8 MG/DL (ref 0.6–1.2)
GFR AFRICAN AMERICAN: >60
GFR NON-AFRICAN AMERICAN: >60
GLUCOSE BLD-MCNC: 76 MG/DL (ref 70–99)
POTASSIUM SERPL-SCNC: 4.8 MMOL/L (ref 3.5–5.1)
SODIUM BLD-SCNC: 138 MMOL/L (ref 136–145)

## 2018-01-09 NOTE — TELEPHONE ENCOUNTER
Plan:  1. Continue current medications. 2. Check labs in January (BMP). 3. Follow up in 3 months, earlier for worsening.         Thank you for allowing me to participate in the care of your patient.     Sherman Omalley CNP       Bmp ordered.

## 2018-01-10 ENCOUNTER — TELEPHONE (OUTPATIENT)
Dept: CARDIOLOGY CLINIC | Age: 73
End: 2018-01-10

## 2018-01-29 ENCOUNTER — ANTI-COAG VISIT (OUTPATIENT)
Dept: PHARMACY | Age: 73
End: 2018-01-29

## 2018-01-29 DIAGNOSIS — Z79.01 CHRONIC ANTICOAGULATION: ICD-10-CM

## 2018-01-29 DIAGNOSIS — I48.20 CHRONIC ATRIAL FIBRILLATION (HCC): ICD-10-CM

## 2018-01-29 LAB
INR BLD: 2.3
PROTIME: 27.5 SECONDS

## 2018-01-29 NOTE — PROGRESS NOTES
Ms. Chantale Ramirez is a 74 y.o. y/o female with history of Afib, CVA in 2008 who presents today for anticoagulation monitoring and adjustment. Pertinent PMH:CHF, ICD defibrillator/ pacemaker. 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 Patient Reported Findings:  Yes     No  [x]   []       Patient verifies current dosing regimen as listed  []   [x]       S/S bleeding/bruising/swelling/SOB  []   [x]       Blood in urine or stool  []   [x]       Procedures scheduled in the future at this time  []   [x]       Missed Dose  []   [x]       Extra Dose  []   [x]       Change in medications  []   [x]       Change in health/diet/appetite - really only eats green beans every now and then   []   [x]       Change in alcohol use  []   [x]       Change in activity  []   [x]       Hospital admission  []   [x]       Emergency department visit  []   [x]       Other complaints    Clinical Outcomes:  Yes     No  []   [x]       Major bleeding event  []   [x]       Thromboembolic event    Duration of warfarin Therapy: indefinite  INR Range:  2.0-3.0    INR 2.3 today  Continue same weekly dose of 1.25 mg on Mon, Wed & Fri and 2.5 mg all other days. Encouraged to maintain a consistency of vegetables/salads.   Recheck INR in 6 weeks, 3/12    Cardiologist is Dr. Flora Daniels  INR (no units)   Date Value   01/29/2018 2.3   12/18/2017 3.1   11/06/2017 2.4   10/16/2017 2.7

## 2018-02-01 ENCOUNTER — HOSPITAL ENCOUNTER (OUTPATIENT)
Dept: OTHER | Age: 73
Discharge: HOME | End: 2018-02-28
Attending: INTERNAL MEDICINE | Admitting: INTERNAL MEDICINE

## 2018-02-02 ENCOUNTER — OFFICE VISIT (OUTPATIENT)
Dept: CARDIOLOGY CLINIC | Age: 73
End: 2018-02-02

## 2018-02-02 VITALS
BODY MASS INDEX: 32.3 KG/M2 | DIASTOLIC BLOOD PRESSURE: 60 MMHG | SYSTOLIC BLOOD PRESSURE: 110 MMHG | OXYGEN SATURATION: 97 % | WEIGHT: 205.8 LBS | HEIGHT: 67 IN | RESPIRATION RATE: 16 BRPM | HEART RATE: 68 BPM

## 2018-02-02 DIAGNOSIS — I42.8 NICM (NONISCHEMIC CARDIOMYOPATHY) (HCC): ICD-10-CM

## 2018-02-02 DIAGNOSIS — I50.22 SYSTOLIC CHF, CHRONIC (HCC): Primary | Chronic | ICD-10-CM

## 2018-02-02 DIAGNOSIS — G47.33 OSA (OBSTRUCTIVE SLEEP APNEA): ICD-10-CM

## 2018-02-02 DIAGNOSIS — I48.20 CHRONIC ATRIAL FIBRILLATION (HCC): ICD-10-CM

## 2018-02-02 DIAGNOSIS — Z95.810 AUTOMATIC IMPLANTABLE CARDIOVERTER-DEFIBRILLATOR IN SITU: Chronic | ICD-10-CM

## 2018-02-02 PROCEDURE — 99214 OFFICE O/P EST MOD 30 MIN: CPT | Performed by: NURSE PRACTITIONER

## 2018-02-02 PROCEDURE — 93290 INTERROG DEV EVAL ICPMS IP: CPT | Performed by: NURSE PRACTITIONER

## 2018-02-08 ENCOUNTER — TELEPHONE (OUTPATIENT)
Dept: CARDIOLOGY CLINIC | Age: 73
End: 2018-02-08

## 2018-02-13 ENCOUNTER — NURSE ONLY (OUTPATIENT)
Dept: CARDIOLOGY CLINIC | Age: 73
End: 2018-02-13

## 2018-02-13 DIAGNOSIS — I42.8 NICM (NONISCHEMIC CARDIOMYOPATHY) (HCC): Chronic | ICD-10-CM

## 2018-02-13 DIAGNOSIS — I50.22 SYSTOLIC CHF, CHRONIC (HCC): Chronic | ICD-10-CM

## 2018-02-13 DIAGNOSIS — Z95.810 AUTOMATIC IMPLANTABLE CARDIOVERTER-DEFIBRILLATOR IN SITU: Primary | ICD-10-CM

## 2018-02-13 PROCEDURE — 93297 REM INTERROG DEV EVAL ICPMS: CPT | Performed by: INTERNAL MEDICINE

## 2018-02-13 PROCEDURE — 93295 DEV INTERROG REMOTE 1/2/MLT: CPT | Performed by: INTERNAL MEDICINE

## 2018-02-13 PROCEDURE — 93296 REM INTERROG EVL PM/IDS: CPT | Performed by: INTERNAL MEDICINE

## 2018-02-19 ENCOUNTER — OFFICE VISIT (OUTPATIENT)
Dept: NEUROLOGY | Age: 73
End: 2018-02-19

## 2018-02-19 VITALS
WEIGHT: 205 LBS | HEART RATE: 75 BPM | HEIGHT: 67 IN | SYSTOLIC BLOOD PRESSURE: 122 MMHG | DIASTOLIC BLOOD PRESSURE: 74 MMHG | BODY MASS INDEX: 32.18 KG/M2

## 2018-02-19 DIAGNOSIS — G47.33 OBSTRUCTIVE APNEA: Chronic | ICD-10-CM

## 2018-02-19 DIAGNOSIS — G40.209 PARTIAL SYMPTOMATIC EPILEPSY WITH COMPLEX PARTIAL SEIZURES, NOT INTRACTABLE, WITHOUT STATUS EPILEPTICUS (HCC): Primary | ICD-10-CM

## 2018-02-19 DIAGNOSIS — G43.719 INTRACTABLE CHRONIC MIGRAINE WITHOUT AURA AND WITHOUT STATUS MIGRAINOSUS: ICD-10-CM

## 2018-02-19 PROCEDURE — 99214 OFFICE O/P EST MOD 30 MIN: CPT | Performed by: PSYCHIATRY & NEUROLOGY

## 2018-02-19 RX ORDER — DIVALPROEX SODIUM 500 MG/1
TABLET, EXTENDED RELEASE ORAL
Qty: 90 TABLET | Refills: 1 | Status: SHIPPED | OUTPATIENT
Start: 2018-02-19 | End: 2018-08-22 | Stop reason: SDUPTHER

## 2018-02-19 RX ORDER — TOPIRAMATE 25 MG/1
TABLET ORAL
Qty: 60 TABLET | Refills: 1 | Status: SHIPPED | OUTPATIENT
Start: 2018-02-19 | End: 2018-04-16 | Stop reason: SDUPTHER

## 2018-03-01 ENCOUNTER — HOSPITAL ENCOUNTER (OUTPATIENT)
Dept: OTHER | Age: 73
Discharge: HOME | End: 2018-03-31
Attending: INTERNAL MEDICINE | Admitting: INTERNAL MEDICINE

## 2018-03-05 ENCOUNTER — TELEPHONE (OUTPATIENT)
Dept: PHARMACY | Age: 73
End: 2018-03-05

## 2018-03-05 NOTE — TELEPHONE ENCOUNTER
----- Message from Clara Thomas sent at 3/5/2018  4:46 PM EST -----  Contact: Patient  Please call patient @ [phone] re: new medications. Ciprofloxacin 500mg, x2 per day and Topiramate 25 MG 1 tablet daily for 1 week and then 1 tablet by mouth twice daily.

## 2018-03-05 NOTE — TELEPHONE ENCOUNTER
Returned call to patient. Topiramate will not interact with warfarin. Cipro can increase INR. Advised patient to take 1.25 mg tomorrow rather than 2.5 mg dose (3/6) then return to weekly dose as instructed. Will rechecked INR on 3/12.

## 2018-03-12 ENCOUNTER — ANTI-COAG VISIT (OUTPATIENT)
Dept: PHARMACY | Age: 73
End: 2018-03-12

## 2018-03-12 DIAGNOSIS — I48.20 CHRONIC ATRIAL FIBRILLATION (HCC): ICD-10-CM

## 2018-03-12 DIAGNOSIS — Z79.01 CHRONIC ANTICOAGULATION: ICD-10-CM

## 2018-03-12 LAB
INR BLD: 1.4
PROTIME: 16.6 SECONDS

## 2018-03-13 ENCOUNTER — OFFICE VISIT (OUTPATIENT)
Dept: SLEEP MEDICINE | Age: 73
End: 2018-03-13

## 2018-03-13 VITALS
SYSTOLIC BLOOD PRESSURE: 102 MMHG | HEIGHT: 67 IN | WEIGHT: 205 LBS | HEART RATE: 75 BPM | DIASTOLIC BLOOD PRESSURE: 62 MMHG | OXYGEN SATURATION: 95 % | BODY MASS INDEX: 32.18 KG/M2

## 2018-03-13 DIAGNOSIS — I42.9 CARDIOMYOPATHY, IDIOPATHIC (HCC): Chronic | ICD-10-CM

## 2018-03-13 DIAGNOSIS — E66.9 NON MORBID OBESITY, UNSPECIFIED OBESITY TYPE: Chronic | ICD-10-CM

## 2018-03-13 DIAGNOSIS — I50.22 SYSTOLIC CHF, CHRONIC (HCC): Chronic | ICD-10-CM

## 2018-03-13 DIAGNOSIS — G47.33 OBSTRUCTIVE SLEEP APNEA SYNDROME: Primary | Chronic | ICD-10-CM

## 2018-03-13 DIAGNOSIS — G40.209 PARTIAL EPILEPSY WITH IMPAIRMENT OF CONSCIOUSNESS (HCC): Chronic | ICD-10-CM

## 2018-03-13 DIAGNOSIS — F32.A DEPRESSION, UNSPECIFIED DEPRESSION TYPE: Chronic | ICD-10-CM

## 2018-03-13 PROCEDURE — 99214 OFFICE O/P EST MOD 30 MIN: CPT | Performed by: NURSE PRACTITIONER

## 2018-03-13 ASSESSMENT — SLEEP AND FATIGUE QUESTIONNAIRES
HOW LIKELY ARE YOU TO NOD OFF OR FALL ASLEEP WHEN YOU ARE A PASSENGER IN A CAR FOR AN HOUR WITHOUT A BREAK: 0
HOW LIKELY ARE YOU TO NOD OFF OR FALL ASLEEP WHILE LYING DOWN TO REST IN THE AFTERNOON WHEN CIRCUMSTANCES PERMIT: 0
HOW LIKELY ARE YOU TO NOD OFF OR FALL ASLEEP WHILE SITTING AND READING: 0
ESS TOTAL SCORE: 0
HOW LIKELY ARE YOU TO NOD OFF OR FALL ASLEEP IN A CAR, WHILE STOPPED FOR A FEW MINUTES IN TRAFFIC: 0
HOW LIKELY ARE YOU TO NOD OFF OR FALL ASLEEP WHILE SITTING AND TALKING TO SOMEONE: 0
HOW LIKELY ARE YOU TO NOD OFF OR FALL ASLEEP WHILE WATCHING TV: 0
HOW LIKELY ARE YOU TO NOD OFF OR FALL ASLEEP WHILE SITTING QUIETLY AFTER LUNCH WITHOUT ALCOHOL: 0
HOW LIKELY ARE YOU TO NOD OFF OR FALL ASLEEP WHILE SITTING INACTIVE IN A PUBLIC PLACE: 0

## 2018-03-13 ASSESSMENT — ENCOUNTER SYMPTOMS
COUGH: 0
ABDOMINAL PAIN: 0
APNEA: 0
RHINORRHEA: 0
SHORTNESS OF BREATH: 0
ABDOMINAL DISTENTION: 0
SINUS PRESSURE: 0

## 2018-03-13 NOTE — LETTER
[facility] Sleep Medicine  [address]  [facility] [address]  Phone: [phone]  Fax: [phone]    March 13, 2018       Patient: Umair Hoffmann   MR Number: [id number]   YOB: 1945   Date of Visit: 3/13/2018       Vania Blair was seen for a follow up visit today. Here is my assessment and plan as well as an attached copy of her visit today:      ASSESSMENT:  Chelly Quick was seen today for sleep apnea. Diagnoses and all orders for this visit:    Obstructive sleep apnea syndrome    Systolic CHF, chronic (HCC)    Cardiomyopathy, idiopathic (HCC)    Depression, unspecified depression type    Partial epilepsy with impairment of consciousness ([facility]    Non morbid obesity, unspecified obesity type        Plan:       If you have questions or concerns, please do not hesitate to call me. I look forward to following Chelly Quick along with you.     Sincerely,      Nayeli Lemons, CNP    CC providers:  Luis Miguel Blankenship MD  [address] [place] [address] [place]  [place] [address]  VIA In [address], [place]  [address]  [address]  [facility] Farrukh Gonzalez MD  [address], [facility]  VIA In [facility] [address]

## 2018-03-13 NOTE — PROGRESS NOTES
Meri Bruce MD, FAASM, [facility]  Miguel Ferrer, MSN, RN, CNP [address]  3rd Floor, [address] [facility], [address] [phone]   [address] [place] [address] [address] [phone]       [facility]    Subjective:     Patient ID: Ron Rosales is a 74 y.o. female. Chief Complaint   Patient presents with    Sleep Apnea       HPI:      Machine Present today:  Yes    Machine Modem/Download Info:  Compliance (hours/night): 9 hrs/night  Download AHI (/hour): 1.1 /HR  Average CPAP Pressure : 14.7 cmH2O           APAP - Settings  Pressure Min: 14 cmH2O  Pressure Max: 17 cmH2O                 Comfort Settings  Humidity Level (0-8): 4  Heated Tubing (Yes/No): Yes  Flex/EPR (0-3): 3 PAP Mask  Mask Type: Nasal pillows  Mask Model: P10  Mask Size: M     [place] - Total score: 0    Follow-up :     Last Visit : March 2017      Patient reports the listed Co-morbidities are well controlled and stable at this time. Subjective Health Changes: None    Follow-up :      Patient is compliant with the machine  [x] Yes  [] No   Feeling rested when using the machine   [x] Yes  [] No     Pressure is comfortable with inspiration and expiration  [x] Yes  [] No     Noticed changes in pressure   [] Yes  [] No  [x] NA   Mask is fitting well  [x] Yes  [] No   Noting Mask Air Leak  [] Yes  [x] No   Having painful Aerophagia  [] Yes  [x] No   Nocturia   \"wearing diapers\" a couple of accidents   per night.    Having  HA upon waking  [] Yes  [x] No   Dry mouth upon waking   [x] Yes  [] No   Congestion upon waking   [] Yes  [x] No    Nose Bleeds  [] Yes  [x] No   Using Sleep Aides  Ambien- 10mg per PMD    Understands how to change humidification and/or tubing temperature for comfort while at home  [x] Yes  [] No     Difficulties falling asleep  [] Yes  [x] No   Difficulties staying asleep  [] Yes  [x] No   Approximate time to bed DAILY 11/29/17  Yes Stella Peterson CNP   venlafaxine (EFFEXOR) 37.5 MG tablet TAKE 1 TABLET EVERY EVENING 9/15/17  Yes Marlon Posey MD   carvedilol (COREG) 25 MG tablet TAKE ONE-HALF (1/2) TABLET (12.5 MG) IN THE MORNING AND 1 TABLET (25 MG) IN THE EVENING 3/23/17  Yes Marlon Posey MD   Multiple Vitamins-Minerals (CENTRUM SILVER) TABS Take  by mouth daily. 6/1/14  Yes Historical Provider, MD   zolpidem (AMBIEN) 10 MG tablet Take 10 mg by mouth nightly as needed. Yes Historical Provider, MD   warfarin (COUMADIN) 5 MG tablet As directed    Patient taking differently: Managed by [facility] 1/9/12  Yes Marlon Posey MD   aspirin 81 MG EC tablet Take 81 mg by mouth daily.      Yes Historical Provider, MD       Allergies as of 03/13/2018 - Review Complete 03/13/2018   Allergen Reaction Noted    Seasonal  08/25/2017       Patient Active Problem List   Diagnosis    Peripheral vascular disease ([address]    Atrial fibrillation    NICM (nonischemic cardiomyopathy) ([address]    LBBB (left bundle branch block)    Cerebral infarction ([address]    Systolic CHF, chronic (HCC)    Seizure disorder    Chronic anticoagulation    Automatic implantable cardioverter-defibrillator in situ    Partial epilepsy with impairment of consciousness ([address]    Other paralytic syndrome affecting nondominant side, late effect of cerebrovascular disease    Depression    Fatigue    Hemiparesis affecting left side as late effect of cerebrovascular accident ([address]    Hyperlipidemia    Obstructive apnea    Partial symptomatic epilepsy with complex partial seizures, not intractable, without status epilepticus ([address]       Past Medical History:   Diagnosis Date    CHF (congestive heart failure) ([address]     CVA (cerebral infarction)     History of verrucae (wart) excision     Hyperlipidemia     Hypertension     Leg pain     Obstructive apnea     Unspecified cerebral artery occlusion with cerebral infarction     Valvular disease

## 2018-03-19 RX ORDER — CARVEDILOL 25 MG/1
TABLET ORAL
Qty: 135 TABLET | Refills: 3 | Status: SHIPPED | OUTPATIENT
Start: 2018-03-19 | End: 2019-03-25 | Stop reason: SDUPTHER

## 2018-03-26 ENCOUNTER — ANTI-COAG VISIT (OUTPATIENT)
Dept: PHARMACY | Age: 73
End: 2018-03-26

## 2018-03-26 DIAGNOSIS — I48.20 CHRONIC ATRIAL FIBRILLATION (HCC): ICD-10-CM

## 2018-03-26 DIAGNOSIS — Z79.01 CHRONIC ANTICOAGULATION: ICD-10-CM

## 2018-03-26 LAB
INR BLD: 2.1
PROTIME: 24.9 SECONDS

## 2018-03-26 NOTE — PROGRESS NOTES
Ms. Maddy Kenny is a 74 y.o. y/o female with history of Afib, CVA in 2008   She presents today for anticoagulation monitoring and adjustment. Pertinent PMH: CHF, ICD defibrillator/ pacemaker. 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 Patient Reported Findings:  Yes     No  [x]   []       Patient verifies current dosing regimen as listed  []   [x]       S/S bleeding/bruising/swelling/SOB  []   [x]       Blood in urine or stool  []   [x]       Procedures scheduled in the future at this time  []   [x]       Missed Dose She denies any missed doses. []   [x]       Extra Dose  []   [x]       Change in medications   []   [x]       Change in health/diet/appetite.  []   [x]       Change in alcohol use  []   [x]       Change in activity  []   [x]       Hospital admission  []   [x]       Emergency department visit  []   [x]       Other complaints    Clinical Outcomes:  Yes     No  []   [x]       Major bleeding event  []   [x]       Thromboembolic event    Duration of warfarin Therapy: indefinite  INR Range:  2.0-3.0    INR is 2.1 today   Continue same weekly dose of 1.25 mg on Mon, Wed & Fri and 2.5 mg all other days   Encouraged to maintain a consistency of vegetables/salads. Recheck INR in 4 weeks  Consider resuming 6 week appointments when appropriate.     Cardiologist is Dr. Yanni Vaz  INR (no units)   Date Value   03/26/2018 2.1   03/12/2018 1.4   01/29/2018 2.3   12/18/2017 3.1

## 2018-04-01 ENCOUNTER — HOSPITAL ENCOUNTER (OUTPATIENT)
Dept: OTHER | Age: 73
Discharge: HOME | End: 2018-04-30
Attending: INTERNAL MEDICINE | Admitting: INTERNAL MEDICINE

## 2018-04-03 ENCOUNTER — HOSPITAL ENCOUNTER (OUTPATIENT)
Dept: OTHER | Age: 73
Discharge: HOME | End: 2018-04-03
Attending: NURSE PRACTITIONER | Admitting: NURSE PRACTITIONER

## 2018-04-03 DIAGNOSIS — I50.22 SYSTOLIC CHF, CHRONIC (HCC): Chronic | ICD-10-CM

## 2018-04-03 LAB
ANION GAP SERPL CALCULATED.3IONS-SCNC: 15 MMOL/L (ref 3–16)
BUN BLDV-MCNC: 14 MG/DL (ref 7–20)
CALCIUM SERPL-MCNC: 9.3 MG/DL (ref 8.3–10.6)
CHLORIDE BLD-SCNC: 103 MMOL/L (ref 99–110)
CO2: 22 MMOL/L (ref 21–32)
CREAT SERPL-MCNC: 0.8 MG/DL (ref 0.6–1.2)
GFR AFRICAN AMERICAN: >60
GFR NON-AFRICAN AMERICAN: >60
GLUCOSE BLD-MCNC: 73 MG/DL (ref 70–99)
HCT VFR BLD CALC: 42 % (ref 36–48)
HEMOGLOBIN: 14.5 G/DL (ref 12–16)
MCH RBC QN AUTO: 35 PG (ref 26–34)
MCHC RBC AUTO-ENTMCNC: 34.4 G/DL (ref 31–36)
MCV RBC AUTO: 101.6 FL (ref 80–100)
PDW BLD-RTO: 13.1 % (ref 12.4–15.4)
PLATELET # BLD: 223 K/UL (ref 135–450)
PMV BLD AUTO: 8.6 FL (ref 5–10.5)
POTASSIUM SERPL-SCNC: 4.4 MMOL/L (ref 3.5–5.1)
PRO-BNP: 1059 PG/ML (ref 0–449)
RBC # BLD: 4.13 M/UL (ref 4–5.2)
SODIUM BLD-SCNC: 140 MMOL/L (ref 136–145)
WBC # BLD: 7.6 K/UL (ref 4–11)

## 2018-04-04 ENCOUNTER — TELEPHONE (OUTPATIENT)
Dept: CARDIOLOGY CLINIC | Age: 73
End: 2018-04-04

## 2018-04-16 ENCOUNTER — OFFICE VISIT (OUTPATIENT)
Dept: NEUROLOGY | Age: 73
End: 2018-04-16

## 2018-04-16 VITALS
BODY MASS INDEX: 31.39 KG/M2 | HEIGHT: 67 IN | SYSTOLIC BLOOD PRESSURE: 128 MMHG | HEART RATE: 59 BPM | WEIGHT: 200 LBS | DIASTOLIC BLOOD PRESSURE: 64 MMHG

## 2018-04-16 DIAGNOSIS — G40.209 PARTIAL SYMPTOMATIC EPILEPSY WITH COMPLEX PARTIAL SEIZURES, NOT INTRACTABLE, WITHOUT STATUS EPILEPTICUS (HCC): Primary | ICD-10-CM

## 2018-04-16 PROCEDURE — 99213 OFFICE O/P EST LOW 20 MIN: CPT | Performed by: PSYCHIATRY & NEUROLOGY

## 2018-04-16 RX ORDER — TOPIRAMATE 25 MG/1
TABLET ORAL
Qty: 60 TABLET | Refills: 5 | Status: SHIPPED | OUTPATIENT
Start: 2018-04-16 | End: 2018-10-15 | Stop reason: SDUPTHER

## 2018-04-16 RX ORDER — SIMVASTATIN 20 MG
TABLET ORAL
Qty: 90 TABLET | Refills: 3 | Status: SHIPPED | OUTPATIENT
Start: 2018-04-16 | End: 2019-04-26 | Stop reason: SDUPTHER

## 2018-04-25 ENCOUNTER — ANTI-COAG VISIT (OUTPATIENT)
Dept: PHARMACY | Age: 73
End: 2018-04-25

## 2018-04-25 DIAGNOSIS — I48.20 CHRONIC ATRIAL FIBRILLATION (HCC): ICD-10-CM

## 2018-04-25 DIAGNOSIS — Z79.01 CHRONIC ANTICOAGULATION: ICD-10-CM

## 2018-04-25 LAB
INR BLD: 3.3
PROTIME: 40.2 SECONDS

## 2018-04-27 ENCOUNTER — TELEPHONE (OUTPATIENT)
Dept: NEUROLOGY | Age: 73
End: 2018-04-27

## 2018-05-01 ENCOUNTER — HOSPITAL ENCOUNTER (OUTPATIENT)
Dept: OTHER | Age: 73
Discharge: HOME | End: 2018-05-31
Attending: INTERNAL MEDICINE | Admitting: INTERNAL MEDICINE

## 2018-05-14 ENCOUNTER — ANTI-COAG VISIT (OUTPATIENT)
Dept: PHARMACY | Age: 73
End: 2018-05-14

## 2018-05-14 DIAGNOSIS — Z79.01 CHRONIC ANTICOAGULATION: ICD-10-CM

## 2018-05-14 DIAGNOSIS — I48.20 CHRONIC ATRIAL FIBRILLATION (HCC): ICD-10-CM

## 2018-05-14 LAB
INR BLD: 1.8
PROTIME: 21.8 SECONDS

## 2018-05-22 ENCOUNTER — NURSE ONLY (OUTPATIENT)
Dept: CARDIOLOGY CLINIC | Age: 73
End: 2018-05-22

## 2018-05-22 DIAGNOSIS — Z95.810 AUTOMATIC IMPLANTABLE CARDIOVERTER-DEFIBRILLATOR IN SITU: Primary | ICD-10-CM

## 2018-05-22 DIAGNOSIS — I42.8 NICM (NONISCHEMIC CARDIOMYOPATHY) (HCC): Chronic | ICD-10-CM

## 2018-05-22 DIAGNOSIS — I50.22 SYSTOLIC CHF, CHRONIC (HCC): Chronic | ICD-10-CM

## 2018-05-22 PROCEDURE — 93295 DEV INTERROG REMOTE 1/2/MLT: CPT | Performed by: INTERNAL MEDICINE

## 2018-05-22 PROCEDURE — 93296 REM INTERROG EVL PM/IDS: CPT | Performed by: INTERNAL MEDICINE

## 2018-05-22 PROCEDURE — 93297 REM INTERROG DEV EVAL ICPMS: CPT | Performed by: INTERNAL MEDICINE

## 2018-05-29 ENCOUNTER — ANTI-COAG VISIT (OUTPATIENT)
Dept: PHARMACY | Age: 73
End: 2018-05-29

## 2018-05-29 DIAGNOSIS — Z79.01 CHRONIC ANTICOAGULATION: ICD-10-CM

## 2018-05-29 DIAGNOSIS — I48.20 CHRONIC ATRIAL FIBRILLATION (HCC): ICD-10-CM

## 2018-05-29 LAB
INR BLD: 3.4
PROTIME: 41 SECONDS

## 2018-06-01 ENCOUNTER — HOSPITAL ENCOUNTER (OUTPATIENT)
Dept: OTHER | Age: 73
Discharge: HOME | End: 2018-06-30
Attending: INTERNAL MEDICINE | Admitting: INTERNAL MEDICINE

## 2018-06-18 ENCOUNTER — ANTI-COAG VISIT (OUTPATIENT)
Dept: PHARMACY | Age: 73
End: 2018-06-18

## 2018-06-18 DIAGNOSIS — I48.20 CHRONIC ATRIAL FIBRILLATION (HCC): ICD-10-CM

## 2018-06-18 DIAGNOSIS — Z79.01 CHRONIC ANTICOAGULATION: ICD-10-CM

## 2018-06-18 LAB
INR BLD: 3.3
PROTIME: 39.2 SECONDS

## 2018-06-26 ENCOUNTER — NURSE ONLY (OUTPATIENT)
Dept: CARDIOLOGY CLINIC | Age: 73
End: 2018-06-26

## 2018-06-26 DIAGNOSIS — Z95.810 ICD (IMPLANTABLE CARDIOVERTER-DEFIBRILLATOR) IN PLACE: Primary | ICD-10-CM

## 2018-06-26 DIAGNOSIS — Z95.810 AUTOMATIC IMPLANTABLE CARDIOVERTER-DEFIBRILLATOR IN SITU: Chronic | ICD-10-CM

## 2018-07-01 ENCOUNTER — HOSPITAL ENCOUNTER (OUTPATIENT)
Dept: OTHER | Age: 73
Discharge: HOME | End: 2018-07-31
Attending: INTERNAL MEDICINE | Admitting: INTERNAL MEDICINE

## 2018-07-09 ENCOUNTER — ANTI-COAG VISIT (OUTPATIENT)
Dept: PHARMACY | Age: 73
End: 2018-07-09

## 2018-07-09 DIAGNOSIS — Z79.01 CHRONIC ANTICOAGULATION: ICD-10-CM

## 2018-07-09 DIAGNOSIS — I48.20 CHRONIC ATRIAL FIBRILLATION (HCC): ICD-10-CM

## 2018-07-09 LAB
INR BLD: 1.9
PROTIME: 23 SECONDS

## 2018-07-28 NOTE — PROGRESS NOTES
[address] [facility] - Cardiology      Chief Complaint: \"shortness of breath\"    Chief Complaint   Patient presents with   Governor Ros     Denies chest pain, dizziness, sob or edema    Congestive Heart Failure    Cardiomyopathy    Atrial Fibrillation    Fatigue    Headache     gets headaches alot, \"at least twice a week\"    Toe Pain     feels she is getting some arthritis in R great toe and R thumb       History of present illness: Catherine Bolanos is a 74 y.o. female  with past medical history significant for sCHF, NICM, s/p CVA from presumed embolic source, seizures, chronic atrial fib, sleep apnea on CPAP and hypertension. LHC (2008) revealed normal coronary arteries and EF 10-20%. Last echo (12/2016) with EF 30%. She transitioned to [facility] 8/10/17 for symptoms of fatigue and dyspnea. Seen in office 9/19 and Lasix resumed two days a week for evidence of volume overload. Patient presents today for follow up NICM and chronic sCHF. She says she is doing okay but has not noticed any change in symptoms on the [facility]. She remains on low dose (1/2 of 24/26 mg tab BID), hypotension has been limiting factor to up-titration of med. She continues to note mild exertional shortness of breath when ambulating. She stopped taking Lasix again secondary to incontinence and expense of adult diapers. Denies orthopnea, PND. Office weight today is down 3 lbs compared to last visit but still up 5 lbs compared to visit in August. She does not weigh herself at home. She remains fatigued. Catherine Bolanos describes symptoms including dyspnea, fatigue but denies chest discomfort, orthopnea, PND, palpitations, lightheadedness. Today's CARELINK consistent with stable fluid status.          Past Medical History:   Diagnosis Date    CHF (congestive heart failure) (HCC)     CVA (cerebral infarction)     History of verrucae (wart) excision     Hyperlipidemia     Hypertension     Leg pain     Obstructive apnea     Unspecified cerebral artery occlusion with cerebral infarction     Valvular disease      Past Surgical History:   Procedure Laterality Date    ABSCESS DRAINAGE      CARDIAC DEFIBRILLATOR PLACEMENT       Social History   Substance Use Topics    Smoking status: Former Smoker     Quit date: 9/16/1981    Smokeless tobacco: Never Used    Alcohol use No     Review of Systems:   Constitutional: No significant change in weight, + fatigue   HEENT: No change in vision or ringing in the ears. Respiratory: + DELATORRE, no PND, orthopnea or cough. Cardiovascular: See HPI  GI: No n/v, abdominal pain or changes in bowel habits. No melena, no hematochezia  : No dysuria or hematuria.+ incontinence  Skin: No rash or new skin lesions. Musculoskeletal: + joint pain in base of rt thumb and rt great toe (relieved with Tylenol). Neurological: No lightheadedness, dizziness, syncope or TIA-like symptoms. + intermittent HA (relieved with Tylenol)  Psychiatric: No anxiety, insomnia or depression    Physical Exam:  /74 (Site: Right Arm, Position: Sitting, Cuff Size: Large Adult)   Pulse 63   Resp 16   Ht 5' 7\" (1.702 m)   Wt 204 lb 12.8 oz (92.9 kg)   SpO2 95%   BMI 32.08 kg/m²     General:  Awake, alert, oriented in NAD  Skin:  Warm and dry. No unusual bruising or rash  HEENT: Normocephalic and atraumatic. Oral mucosa moist, no cyanosis. Neck:  Supple. No JVP appreciated  Chest:  Normal effort.   Few crackles in bases, no wheezes  Cardiovascular:  Irregular, S1/S2, no murmur/gallop/rub  Abdomen:  Soft, nontender, +bowel sounds  Extremities:  Trace BLE edema, no swelling or redness noted in joints  Neurological: No focal deficits  Psychological: Normal mood and affect      Current Outpatient Prescriptions   Medication Sig Dispense Refill    sacubitril-valsartan (ENTRESTO) 24-26 MG per tablet Will take 1/2 tablet twice a day 60 tablet 11    venlafaxine (EFFEXOR) 37.5 MG tablet TAKE 1 estimated EF 25-30%. E/e'=18. Moderate mitral regurgitation. Mild to moderate aortic insufficiency. Mild tricuspid regurgitation with RVSP estimated at 29 mmHg. Pacemaker / ICD lead was visualized in the right atrium. Trivial pulmonic regurgitation. Assessment:  1. Systolic CHF, chronic. NYHA class III. Appears compensated. 2. NICM (nonischemic cardiomyopathy). EF 30%. On GDMT including Entresto and carvedilol. She is also taking digoxin. 3. Automatic implantable cardioverter-defibrillator in situ. Followed in device clinic. Carelink consistent with stable fluid status. 4. Chronic atrial fibrillation. Controlled rate. On beta blocker and digoxin. Warfarin managed by [facility]. 5. Obstructive sleep apnea. Patient reports compliance with CPAP. As office visit was ending, patient expressed desire for no resuscitation. Will need further discussion to see if this includes wish for device to be deactivated. Unable to discuss today as she was anxious to get to [facility] before they left for the day. Plan:  1. Continue current medications. 2. Check labs in January ([facility]). 3. Follow up in 3 months, earlier for worsening. Thank you for allowing me to participate in the care of your patient. Chanell Dahl CNP        QUALITY MEASURES  1. Tobacco Cessation Counseling: N/A  2. BP retake if >140/90: N/A  3. Communication to PCP: Office note forwarded/faxed to PCP  4. CAD antiplatelet therapy: N/A  5. CAD lipid lowering therapy: N/A  6. HF A.  Fib on anticoagulation: yes No

## 2018-07-30 ENCOUNTER — ANTI-COAG VISIT (OUTPATIENT)
Dept: PHARMACY | Age: 73
End: 2018-07-30

## 2018-07-30 DIAGNOSIS — I48.20 CHRONIC ATRIAL FIBRILLATION (HCC): ICD-10-CM

## 2018-07-30 DIAGNOSIS — Z79.01 CHRONIC ANTICOAGULATION: ICD-10-CM

## 2018-07-30 LAB
INR BLD: 1.9
PROTIME: 23.2 SECONDS

## 2018-07-31 ENCOUNTER — NURSE ONLY (OUTPATIENT)
Dept: CARDIOLOGY CLINIC | Age: 73
End: 2018-07-31

## 2018-07-31 DIAGNOSIS — Z95.810 AUTOMATIC IMPLANTABLE CARDIOVERTER-DEFIBRILLATOR IN SITU: Primary | ICD-10-CM

## 2018-07-31 NOTE — LETTER
[address] [facility] [phone] [address] [facility],[address] [phone]    Pacemaker/Defibrillator Clinic          08/01/18        Yaw Saldana  [address] [facility] [address]        Dear Yaw Saldana    This letter is to inform you that we received the transmission from your monitor at home that checks your pacemaker and/or defibrillator, or implanted heart monitor. Your device is functioning normally. The next date your monitor will automatically transmit will be 9-11-18, to check your battery status as it is getting closer to replacement time. Please do not send additional routine transmissions unless specifically requested. Your device and monitor are wireless and most transmit cellularly, but please periodically check your monitor is still plugged in to the electrical outlet. If you still use the telephone land line to send please ensure the connection to the phone tj is secure. This will help to ensure successful automatic transmissions in the future. Also, the monitor needs to be close to you while sleeping at night. Please be aware that the remote device transmission sites are periodically monitored only during regular business hours during which simultaneous in-office device clinics are being run. If your transmission requires attention, we will contact you as soon as possible. Thank you.             [facility]

## 2018-08-01 ENCOUNTER — HOSPITAL ENCOUNTER (OUTPATIENT)
Dept: OTHER | Age: 73
Discharge: HOME | End: 2018-08-31
Attending: INTERNAL MEDICINE | Admitting: INTERNAL MEDICINE

## 2018-08-03 ENCOUNTER — OFFICE VISIT (OUTPATIENT)
Dept: CARDIOLOGY CLINIC | Age: 73
End: 2018-08-03

## 2018-08-03 VITALS
DIASTOLIC BLOOD PRESSURE: 80 MMHG | BODY MASS INDEX: 32.18 KG/M2 | OXYGEN SATURATION: 97 % | HEART RATE: 70 BPM | WEIGHT: 205 LBS | SYSTOLIC BLOOD PRESSURE: 124 MMHG | HEIGHT: 67 IN

## 2018-08-03 DIAGNOSIS — E78.00 PURE HYPERCHOLESTEROLEMIA: ICD-10-CM

## 2018-08-03 DIAGNOSIS — Z95.810 AUTOMATIC IMPLANTABLE CARDIOVERTER-DEFIBRILLATOR IN SITU: Chronic | ICD-10-CM

## 2018-08-03 DIAGNOSIS — I50.22 SYSTOLIC CHF, CHRONIC (HCC): Primary | Chronic | ICD-10-CM

## 2018-08-03 DIAGNOSIS — I42.8 NICM (NONISCHEMIC CARDIOMYOPATHY) (HCC): Chronic | ICD-10-CM

## 2018-08-03 PROCEDURE — 99214 OFFICE O/P EST MOD 30 MIN: CPT | Performed by: INTERNAL MEDICINE

## 2018-08-03 NOTE — PROGRESS NOTES
[address]   Advanced Heart Failure/Pulmonary Hypertension  Cardiac Evaluation      Lakia Spain  YOB: 1945    Date of Visit:  8/3/18    Chief Complaint   Patient presents with    Congestive Heart Failure     Optivol today  No cardiac complaints         Teeth extraction 8/23/18 with anesthesia with oral surgeon       History of Present Illness:  Ms. Dhiraj Smith is here for follow up of CHF, cardiomyopathy and s/p CVA from presumed embolic source. She is taking coumadin for chronic atrial fibrillation. She had a sleep study and was found to have sleep apnea. However, she still needs to take a sleeping pill. She has been getting a lot of headaches, discussed with Dr. Natalia Em neurologist and her seizure medications were changed. Today, Clayton Cristopher states she is needing to have her teeth removed by an oral surgeon. This is scheduled in a couple of weeks. I have instructed her to stop her warfarin 3 days before procedure and restart the night of the procedure. She has been on Entresto almost a year. Her son is with her today. He doesn't think that the [employer] has given her any extra energy. He states that she is quite sedentary. She states that she is tired. We discussed that she is very deconditioned and needs to be more active to keep from being so tired. She has a danie to cover the Alv[employer]. I explained the benefits of decreased mortality and decreased hospitalization. She is not exercising daily. She denies chest pain, change in her breathing, palpitations or dizziness.       Recent Hospitalization/ Tests:  Echo 12/2/2016  EF 25-30%  Echo 10/2015   Mildly dilated left ventricle.   Normal wall thickness.   Decreased left ventricular systolic function with an estimated EF 25-30%.   E/e'=18.   Moderate mitral regurgitation.   Mild to moderate aortic insufficiency.   Mild tricuspid regurgitation with RVSP estimated at 29 mmHg.   Pacemaker / ICD lead was visualized in the right [facility]) 90 tablet 3    aspirin 81 MG EC tablet Take 81 mg by mouth daily. No current facility-administered medications for this visit. There is no immunization history on file for this patient. Patient Active Problem List   Diagnosis    Peripheral vascular disease ([facility]    Atrial fibrillation    NICM (nonischemic cardiomyopathy) ([facility]    LBBB (left bundle branch block)    Cerebral infarction ([facility]    Systolic CHF, chronic (HCC)    Seizure disorder    Chronic anticoagulation    Automatic implantable cardioverter-defibrillator in situ    Partial epilepsy with impairment of consciousness ([facility]    Other paralytic syndrome affecting nondominant side, late effect of cerebrovascular disease    Depression    Fatigue    Hemiparesis affecting left side as late effect of cerebrovascular accident ([facility]    Hyperlipidemia    Obstructive apnea    Partial symptomatic epilepsy with complex partial seizures, not intractable, without status epilepticus ([facility]       Past Medical History:   Diagnosis Date    CHF (congestive heart failure) (HCC)     CVA (cerebral infarction)     History of verrucae (wart) excision     Hyperlipidemia     Hypertension     Leg pain     Obstructive apnea     Unspecified cerebral artery occlusion with cerebral infarction     Valvular disease      Past Surgical History:   Procedure Laterality Date    ABSCESS DRAINAGE      CARDIAC DEFIBRILLATOR PLACEMENT       Family History   Problem Relation Age of Onset    Cancer Mother     Stroke Father      Social History     Social History    Marital status:      Spouse name: N/A    Number of children: N/A    Years of education: N/A     Occupational History    Not on file.      Social History Main Topics    Smoking status: Former Smoker     Packs/day: 0.25     Years: 2.00     Quit date: 9/16/1981    Smokeless tobacco: Never Used    Alcohol use No    Drug use: No    Sexual activity: No Comment:      Other Topics Concern    Not on file     Social History Narrative    No narrative on file       Review of Systems:   · Constitutional: there has been no unanticipated weight loss. There's been no change in energy level, sleep pattern, or activity level. · Eyes: No visual changes or diplopia. No scleral icterus. · ENT: No Headaches, hearing loss or vertigo. No mouth sores or sore throat. · Cardiovascular: Reviewed in HPI  · Respiratory: No cough or wheezing, no sputum production. No hematemesis. Mild SOB at times. · Gastrointestinal: No abdominal pain, appetite loss, blood in stools. No change in bowel or bladder habits. · Genitourinary: No dysuria, trouble voiding, or hematuria. · Musculoskeletal:  No gait disturbance, weakness or joint complaints. · Integumentary: No rash or pruritis. · Neurological: No headache, diplopia, change in muscle strength, numbness or tingling. No change in gait, balance, coordination, mood, affect, memory, mentation, behavior. · Psychiatric: No anxiety, no depression. · Endocrine: No malaise, fatigue or temperature intolerance. No excessive thirst, fluid intake, or urination. No tremor. · Hematologic/Lymphatic: No abnormal bruising or bleeding, blood clots or swollen lymph nodes. · Allergic/Immunologic: No nasal congestion or hives. Physical Examination:    Vitals:    08/03/18 1120   BP: 124/80   Pulse: 70   SpO2: 97%   Weight: 205 lb (93 kg)   Height: 5' 7\" (1.702 m)     Body mass index is 32.11 kg/m².      Wt Readings from Last 3 Encounters:   08/03/18 205 lb (93 kg)   04/16/18 200 lb (90.7 kg)   03/13/18 205 lb (93 kg)     BP Readings from Last 3 Encounters:   08/03/18 124/80   04/16/18 128/64   03/13/18 102/62        Constitutional and General Appearance: Warm and dry, no apparent distress, normal coloration  HEENT:  Normocephalic, atraumatic  Respiratory:  · Normal excursion and expansion without use of accessory muscles  · Resp Auscultation: Normal breath sounds without dullness  Cardiovascular:  · The apical impulses not displaced  · Heart tones are crisp and normal  · JVP less than 8 cm H2O  · Regular rate and rhythm, normal S1S2, no m/g/r/c  · Peripheral pulses are symmetrical and full  · There is no clubbing, cyanosis of the extremities. · Trace non-pitting edema  · Pedal Pulses: 2+ and equal   Abdomen:  · No masses or tenderness  · Liver/Spleen: No Abnormalities Noted  Neurological/Psychiatric:  · Alert and oriented in all spheres  · Moves all extremities well  · Exhibits normal gait balance and coordination  · No abnormalities of mood, affect, memory, mentation, or behavior are noted    Lab Data: Reviewed. Assessment:    1. Systolic CHF, chronic: Compensated, no signs of fluid by exam today. Optivol shows normal fluid status and no arrhythmias. 2. Atrial fibrillation: HR controlled. Remains on Coumadin. 3. Cardiomyopathy, idiopathic: EF 30-35% by ECHO 4/21/14. Repeat an echo 12/2016 EF is 30%. 4. Chronic anticoagulation: INR's thru [facility] clinic. 5. ICD (implantable cardiac defibrillator) in place: Receives routine device checks through our office. 6. LBBB (left bundle branch block): by EKG. 7. Other and unspecified hyperlipidemia: On Zocor 20 mg daily. 8. Unspecified peripheral vascular disease: 2009 dopplers> 1-15% jennifer. Lower extremities dopplers normal.   9   Fatigue- Vit D. insufficiency managed by her PCP. 10. EDEN- uses CPAP nightly. 11. Hyperlipidemia-12/2016 TC  186 TG  270 HDL  42 LDl 90. LDL is controlled below 100 with taking     PLAN   1. Slip given to have fasting labs soon CBCD, BMP, BNP and lipid. .   2. Encouraged her to restart swimming for exercise if she can. 3. Follow up in 4 months with an echo. Scribe's attestation:   This note was scribed in the presence of Jennifer Nair M.D. by Yaw Vasquez RN     The scribe's documentation has been prepared under my direction and personally reviewed by me in its entirety. I confirm that the note above accurately reflects all work, treatment, procedures, and medical decision making performed by me.       I appreciate the opportunity of cooperating in the care of this individual.    Xochitl Xiong M.D., [facility] [place]

## 2018-08-06 ENCOUNTER — TELEPHONE (OUTPATIENT)
Dept: CARDIOLOGY CLINIC | Age: 73
End: 2018-08-06

## 2018-08-06 NOTE — TELEPHONE ENCOUNTER
Patients ICD has reached the KALEB. She had carelink download last week and battery had not reached the KALEB as of then. Over weekend patient tripped the indicator. Spoke to patient and tried to get in to see EP to discuss. No openings for several weeks. Gave information to scheduling to see what they can find for patient to be seen.

## 2018-08-07 NOTE — PROGRESS NOTES
[facility]   Electrophysiology Consultation   Date: 8/8/2018  Reason for Consultation: Atrial Fibrillation, ICD in situ   Consult Requesting Physician: Eulene Brittle, MD      Chief Complaint   Patient presents with    New Patient     ref Hart Epley ICD needs replaced     Atrial Fibrillation    Hyperlipidemia        HPI: Wilman Doherty is a 74 y.o. female followed closely by Dr. Jonelle Zepeda for cardiomyopathy. She presents today to discuss generator change as BiV ICD has reached KALEB. She has a PMH of chronic atrial fibrillation and CVA, is currently anticoagulated with warfarin. She has had a sleep study that was positive for EDEN . Her most recent EF was 25-30% and she is on Entresto. Her device is monitored remotely through this office. Today, she has no complaints. She has to have dental procedures on 8/23. Past Medical History:   Diagnosis Date    CHF (congestive heart failure) (HCC)     CVA (cerebral infarction)     History of verrucae (wart) excision     Hyperlipidemia     Hypertension     Leg pain     Obstructive apnea     Unspecified cerebral artery occlusion with cerebral infarction     Valvular disease         Past Surgical History:   Procedure Laterality Date    ABSCESS DRAINAGE      CARDIAC DEFIBRILLATOR PLACEMENT         Allergies: Allergies   Allergen Reactions    Seasonal        Social History:   reports that she quit smoking about 36 years ago. She has a 0.50 pack-year smoking history. She has never used smokeless tobacco. She reports that she does not drink alcohol or use drugs. Family History:  family history includes Cancer in her mother; Stroke in her father. Reviewed. Denies family history of sudden cardiac death, arrhythmia, premature CAD    Review of System:  All other systems reviewed and are negative except for that noted above.  Pertinent negatives are:   General: negative for fever, chills   Ophthalmic ROS: negative for - eye pain or loss of vision  ENT ROS: negative for - headaches, sore throat   Respiratory: negative for - cough, sputum  Cardiovascular: Reviewed in HPI  Gastrointestinal: negative for - abdominal pain, diarrhea, N/V  Hematology: negative for - bleeding, blood clots, bruising or jaundice  Genito-Urinary:  negative for - Dysuria or incontinence  Musculoskeletal: negative for - Joint swelling, muscle pain  Neurological: negative for - confusion, dizziness, headaches   Psychiatric: No anxiety, no depression. Dermatological: negative for - rash    Physical Examination:  Vitals:    08/08/18 1402   BP: 122/84   Pulse: 73   SpO2: 97%      Wt Readings from Last 3 Encounters:   08/03/18 205 lb (93 kg)   04/16/18 200 lb (90.7 kg)   03/13/18 205 lb (93 kg)       · Constitutional: Oriented. No distress. · Head: Normocephalic and atraumatic. · Mouth/Throat: Oropharynx is clear and moist.   · Eyes: Conjunctivae normal. EOM are normal.   · Neck: Neck supple. No rigidity. No JVD present. · Cardiovascular: s1 s2, no gallop  · Pulmonary/Chest: Bilateral respiratory sounds. No wheezes, No rhonchi. · Abdominal: Soft. Bowel sounds present. No distension, No tenderness. · Musculoskeletal: No tenderness. No edema    · Lymphadenopathy: Has no cervical adenopathy. · Neurological: Alert and oriented. Cranial nerve appears intact, No Gross deficit   · Skin: Skin is warm and dry. No rash noted. · Psychiatric: Has a normal behavior       Labs, diagnostic and imaging results reviewed. Reviewed. ECG:sinus, BIV paced      Echo: 12/2016  Summary   Technically limited study due to body habitus and lung interface.   Normal left ventricular wall thickness.   Left ventricular size is increased.   The left ventricular systolic function is reduced with an ejection   fraction of 30 %.    Mild mitral regurgitation is present.   Mild aortic regurgitation is present.       Medication:  Current Outpatient Prescriptions   Medication Sig Dispense Refill    addressed to the patient/family. Alternatives to my treatment were discussed. I have discussed the above stated plan and the patient verbalized understanding and agreed with the plan. NOTE: This report was transcribed using voice recognition software. Every effort was made to ensure accuracy, however, inadvertent computerized transcription errors may be present.        Shauna Starkey MD, [address]   Office: [phone]

## 2018-08-08 ENCOUNTER — PROCEDURE VISIT (OUTPATIENT)
Dept: CARDIOLOGY CLINIC | Age: 73
End: 2018-08-08

## 2018-08-08 ENCOUNTER — OFFICE VISIT (OUTPATIENT)
Dept: CARDIOLOGY CLINIC | Age: 73
End: 2018-08-08

## 2018-08-08 VITALS
SYSTOLIC BLOOD PRESSURE: 122 MMHG | HEART RATE: 73 BPM | HEIGHT: 67 IN | DIASTOLIC BLOOD PRESSURE: 84 MMHG | OXYGEN SATURATION: 97 %

## 2018-08-08 DIAGNOSIS — I50.22 SYSTOLIC CHF, CHRONIC (HCC): Chronic | ICD-10-CM

## 2018-08-08 DIAGNOSIS — I42.8 NICM (NONISCHEMIC CARDIOMYOPATHY) (HCC): Chronic | ICD-10-CM

## 2018-08-08 DIAGNOSIS — Z95.810 AUTOMATIC IMPLANTABLE CARDIOVERTER-DEFIBRILLATOR IN SITU: Chronic | ICD-10-CM

## 2018-08-08 DIAGNOSIS — I44.7 LBBB (LEFT BUNDLE BRANCH BLOCK): ICD-10-CM

## 2018-08-08 DIAGNOSIS — I48.91 ATRIAL FIBRILLATION, UNSPECIFIED TYPE (HCC): Primary | Chronic | ICD-10-CM

## 2018-08-08 PROCEDURE — 99215 OFFICE O/P EST HI 40 MIN: CPT | Performed by: INTERNAL MEDICINE

## 2018-08-08 PROCEDURE — 93284 PRGRMG EVAL IMPLANTABLE DFB: CPT | Performed by: INTERNAL MEDICINE

## 2018-08-08 NOTE — PROGRESS NOTES
Patient comes in for programming evaluation for her defibrillator. Patients device started alarming. Carelink last week showed getting closer to the KALEB. Interrogation today shows pt has reached the KALEB. All sensing and pacing parameters are within normal range. No changes need to be made at this time. Please see interrogation for more detail. Patient will see Dr. Michel Coleman today to discuss replacement.

## 2018-08-27 ENCOUNTER — TELEPHONE (OUTPATIENT)
Dept: SLEEP MEDICINE | Age: 73
End: 2018-08-27

## 2018-08-29 ENCOUNTER — ANTI-COAG VISIT (OUTPATIENT)
Dept: PHARMACY | Age: 73
End: 2018-08-29

## 2018-08-29 DIAGNOSIS — I48.91 ATRIAL FIBRILLATION, UNSPECIFIED TYPE (HCC): ICD-10-CM

## 2018-08-29 DIAGNOSIS — Z79.01 CHRONIC ANTICOAGULATION: ICD-10-CM

## 2018-08-29 LAB
INR BLD: 2.1
PROTIME: 24.6 SECONDS

## 2018-08-29 NOTE — PROGRESS NOTES
Ms. Michaela Yanes is a 74 y.o. y/o female with history of Afib, CVA in 2008   She presents today for anticoagulation monitoring and adjustment. Pertinent PMH: CHF, ICD defibrillator/ pacemaker. 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 Patient Reported Findings:  Yes     No  [x]   []       Patient verifies current dosing regimen as listed    []   [x]       S/S bleeding/bruising/swelling/SOB  []   [x]       Blood in urine or stool  [x]   []       Procedures scheduled in the future at this time. Will have pacemaker replaced on September 24th.  []   [x]       Missed Dose  []   [x]       Extra Dose   [x]   []       Change in medications taking amoxicillin after dental extractions and denture placement. []   [x]       Change in health/diet/appetite. Still not eating any vegetables. []   [x]       Change in alcohol use Does not drink alcohol  []   [x]       Change in activity  []   [x]       Hospital admission  []   [x]       Emergency department visit  []   [x]       Other complaints    Clinical Outcomes:  Yes     No  []   [x]       Major bleeding event  []   [x]       Thromboembolic event    Duration of warfarin Therapy: indefinite  INR Range:  2.0-3.0    INR is 2.1 today   Continue same weekly dose of 1.25 mg on Mon, Wed and Fri and 2.5 mg all other days  If INR drops too far consider switching to a 2mg tablet taking 1mg one day a week and 2mg all other days. Recheck INR in 3 weeks,  Consider resuming 6 week appointments when appropriate.     Referring cardiologist is Dr. Jos Lundberg  INR (no units)   Date Value   08/29/2018 2.1   07/30/2018 1.9   07/09/2018 1.9   06/18/2018 3.3

## 2018-09-01 ENCOUNTER — HOSPITAL ENCOUNTER (OUTPATIENT)
Dept: OTHER | Age: 73
Discharge: HOME | End: 2018-09-01
Attending: INTERNAL MEDICINE | Admitting: INTERNAL MEDICINE

## 2018-09-04 ENCOUNTER — HOSPITAL ENCOUNTER (OUTPATIENT)
Dept: OTHER | Age: 73
Discharge: HOME | End: 2018-09-04
Attending: INTERNAL MEDICINE | Admitting: INTERNAL MEDICINE

## 2018-09-04 DIAGNOSIS — Z95.810 AUTOMATIC IMPLANTABLE CARDIOVERTER-DEFIBRILLATOR IN SITU: Chronic | ICD-10-CM

## 2018-09-04 DIAGNOSIS — E78.00 PURE HYPERCHOLESTEROLEMIA: ICD-10-CM

## 2018-09-04 DIAGNOSIS — I50.22 SYSTOLIC CHF, CHRONIC (HCC): Chronic | ICD-10-CM

## 2018-09-04 DIAGNOSIS — I42.8 NICM (NONISCHEMIC CARDIOMYOPATHY) (HCC): Chronic | ICD-10-CM

## 2018-09-04 LAB
ANION GAP SERPL CALCULATED.3IONS-SCNC: 13 MMOL/L (ref 3–16)
BASOPHILS ABSOLUTE: 0 K/UL (ref 0–0.2)
BASOPHILS RELATIVE PERCENT: 0.4 %
BUN BLDV-MCNC: 9 MG/DL (ref 7–20)
CALCIUM SERPL-MCNC: 9.9 MG/DL (ref 8.3–10.6)
CHLORIDE BLD-SCNC: 102 MMOL/L (ref 99–110)
CHOLESTEROL, TOTAL: 158 MG/DL (ref 0–199)
CO2: 22 MMOL/L (ref 21–32)
CREAT SERPL-MCNC: 0.9 MG/DL (ref 0.6–1.2)
EOSINOPHILS ABSOLUTE: 0.1 K/UL (ref 0–0.6)
EOSINOPHILS RELATIVE PERCENT: 1.4 %
GFR AFRICAN AMERICAN: >60
GFR NON-AFRICAN AMERICAN: >60
GLUCOSE BLD-MCNC: 101 MG/DL (ref 70–99)
HCT VFR BLD CALC: 46 % (ref 36–48)
HDLC SERPL-MCNC: 45 MG/DL (ref 40–60)
HEMOGLOBIN: 15.7 G/DL (ref 12–16)
LDL CHOLESTEROL CALCULATED: 68 MG/DL
LYMPHOCYTES ABSOLUTE: 3.4 K/UL (ref 1–5.1)
LYMPHOCYTES RELATIVE PERCENT: 40.6 %
MCH RBC QN AUTO: 35 PG (ref 26–34)
MCHC RBC AUTO-ENTMCNC: 34.1 G/DL (ref 31–36)
MCV RBC AUTO: 102.8 FL (ref 80–100)
MONOCYTES ABSOLUTE: 0.8 K/UL (ref 0–1.3)
MONOCYTES RELATIVE PERCENT: 9.6 %
NEUTROPHILS ABSOLUTE: 4.1 K/UL (ref 1.7–7.7)
NEUTROPHILS RELATIVE PERCENT: 48 %
PDW BLD-RTO: 13.9 % (ref 12.4–15.4)
PLATELET # BLD: 246 K/UL (ref 135–450)
PMV BLD AUTO: 8.1 FL (ref 5–10.5)
POTASSIUM SERPL-SCNC: 4.2 MMOL/L (ref 3.5–5.1)
RBC # BLD: 4.48 M/UL (ref 4–5.2)
SODIUM BLD-SCNC: 137 MMOL/L (ref 136–145)
TRIGL SERPL-MCNC: 225 MG/DL (ref 0–150)
VLDLC SERPL CALC-MCNC: 45 MG/DL
WBC # BLD: 8.5 K/UL (ref 4–11)

## 2018-09-12 RX ORDER — VENLAFAXINE 37.5 MG/1
TABLET ORAL
Qty: 90 TABLET | Refills: 3 | Status: SHIPPED | OUTPATIENT
Start: 2018-09-12 | End: 2019-09-09 | Stop reason: SDUPTHER

## 2018-09-18 ENCOUNTER — ANTI-COAG VISIT (OUTPATIENT)
Dept: PHARMACY | Age: 73
End: 2018-09-18

## 2018-09-18 ENCOUNTER — TELEPHONE (OUTPATIENT)
Dept: CARDIOLOGY CLINIC | Age: 73
End: 2018-09-18

## 2018-09-18 DIAGNOSIS — Z79.01 CHRONIC ANTICOAGULATION: ICD-10-CM

## 2018-09-18 DIAGNOSIS — I48.91 ATRIAL FIBRILLATION, UNSPECIFIED TYPE (HCC): ICD-10-CM

## 2018-09-18 DIAGNOSIS — I63.9 CEREBRAL INFARCTION, UNSPECIFIED MECHANISM (HCC): ICD-10-CM

## 2018-09-18 LAB
INR BLD: 2.8
PROTIME: 34.2 SECONDS

## 2018-09-18 NOTE — PROGRESS NOTES
Ms. Delfina Tomlinson is a 74 y.o. y/o female with history of Afib, CVA in 2008   She presents today for anticoagulation monitoring and adjustment. Pertinent PMH: CHF, ICD defibrillator/ pacemaker. 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 Patient Reported Findings:  Yes     No  [x]   []       Patient verifies current dosing regimen as listed    []   [x]       S/S bleeding/bruising/swelling/SOB  []   [x]       Blood in urine or stool  [x]   []       Procedures scheduled in the future at this time. Will have pacemaker replaced on September 24th. She is to check with cardiology about holding warfarin.  []   [x]       Missed Dose  []   [x]       Extra Dose   []   [x]       Change in medications   []   [x]       Change in health/diet/appetite. She states that she has been eating \"alot\" of vegetables  []   [x]       Change in alcohol use Does not drink alcohol  []   [x]       Change in activity  []   [x]       Hospital admission  []   [x]       Emergency department visit  []   [x]       Other complaints    Clinical Outcomes:  Yes     No  []   [x]       Major bleeding event  []   [x]       Thromboembolic event    Duration of warfarin Therapy: indefinite  INR Range:  2.0-3.0    INR is 2.8 today   Continue same weekly dose of 1.25 mg on Mon, Wed and Fri and 2.5 mg all other days  Recheck INR in 4 weeks,  Consider resuming 6 week appointments when appropriate.     Referring cardiologist is Dr. Romeo Begum  INR (no units)   Date Value   09/18/2018 2.8   08/29/2018 2.1   07/30/2018 1.9   07/09/2018 1.9

## 2018-09-24 ENCOUNTER — HOSPITAL ENCOUNTER (OUTPATIENT)
Dept: CARDIAC CATH/INVASIVE PROCEDURES | Age: 73
Discharge: HOME | End: 2018-09-24
Attending: SURGERY | Admitting: INTERNAL MEDICINE
Payer: MEDICARE

## 2018-09-24 VITALS — WEIGHT: 205 LBS | HEIGHT: 67 IN | BODY MASS INDEX: 32.18 KG/M2

## 2018-09-24 LAB
GFR AFRICAN AMERICAN: >60
GFR NON-AFRICAN AMERICAN: >60
GLUCOSE BLD-MCNC: 95 MG/DL (ref 70–99)
HEMOGLOBIN: 15 G/DL (ref 12–16)
INR BLD: 3.2 (ref 0.85–1.15)
PERFORMED ON: NORMAL
PLATELET # BLD: 211 K/UL (ref 135–450)
POC BUN: 12 MG/DL (ref 7–18)
POC CREATININE: 0.9 MG/DL (ref 0.6–1.2)
POC HEMATOCRIT: 42 % (ref 36–48)
POC POTASSIUM: 4.6 MMOL/L (ref 3.5–5.1)
POC SAMPLE TYPE: NORMAL
POC SODIUM: 138 MMOL/L (ref 136–145)
WBC # BLD: 8.7 K/UL (ref 4–11)

## 2018-09-24 PROCEDURE — 84132 ASSAY OF SERUM POTASSIUM: CPT

## 2018-09-24 PROCEDURE — 84295 ASSAY OF SERUM SODIUM: CPT

## 2018-09-24 PROCEDURE — 84520 ASSAY OF UREA NITROGEN: CPT

## 2018-09-24 PROCEDURE — 82565 ASSAY OF CREATININE: CPT

## 2018-09-24 PROCEDURE — 33264 RMVL & RPLCMT DFB GEN MLT LD: CPT | Performed by: INTERNAL MEDICINE

## 2018-09-24 PROCEDURE — 82947 ASSAY GLUCOSE BLOOD QUANT: CPT

## 2018-09-24 PROCEDURE — 85018 HEMOGLOBIN: CPT

## 2018-09-24 PROCEDURE — 6360000002 HC RX W HCPCS

## 2018-09-24 PROCEDURE — 93005 ELECTROCARDIOGRAM TRACING: CPT

## 2018-09-24 PROCEDURE — 85048 AUTOMATED LEUKOCYTE COUNT: CPT

## 2018-09-24 PROCEDURE — 36415 COLL VENOUS BLD VENIPUNCTURE: CPT

## 2018-09-24 PROCEDURE — 85610 PROTHROMBIN TIME: CPT

## 2018-09-24 PROCEDURE — 85049 AUTOMATED PLATELET COUNT: CPT

## 2018-09-24 PROCEDURE — 2580000003 HC RX 258

## 2018-09-24 PROCEDURE — 85014 HEMATOCRIT: CPT

## 2018-09-24 PROCEDURE — 2500000003 HC RX 250 WO HCPCS

## 2018-09-24 NOTE — PROCEDURES
[facility]     Electrophysiology Procedure Note       Date of Procedure: 9/24/2018  Patient's Name: Colletta Beery  YOB: 1945   Medical Record Number: [id number]  Referring Physician: Kiera att. providers found  Procedure Performed by: Melanie Arvizu MD    Procedures performed:     · Removal of BIV ICD  · Insertion of a BIV ICD  · IV sedation. · Programming and analysis of the device      Indication of the procedure: Colletta Beery is a 74 y.o. female with cardiomyopathy and ICD at [facility]    Details of procedure: The patient was brought to the electrophysiology laboratory in stable condition. The patient was in a fasting and non-sedated state. The risks, benefits and alternatives of the procedure were discussed with the patient ]. The risks including, but not limited to, the risks of vascular injury, bleeding, infection, device malfunction, lead dislodgement, radiation exposure, injury to cardiac and surrounding structures (including pneumothorax), stroke, myocardial infarction and death were discussed in detail. Patient opted to proceed with the device implantation. Written informed consent was signed and placed in the chart. Prophylactic antibiotic was given. The patient was prepped and draped in a sterile fashion. A timeout protocol was completed to identify the patient and the procedure being performed. Pre-sedation evaluation and airway assessment (Mallampatti classification, class llI) was completed. IV sedation was provided with IV Versed, Fentanyl. An incision was made in the left pectoral area after administration of lidocaine. Using electrocautery and blunt dissection,  pocket was opened, old device was removed and leads were connected to new device and inserted into pocket. .         The leads were then connected to the new pulse generator,which was then placed into the cleaned pocket.        . The pocket was then closed in three separate subcutaneous layers using

## 2018-09-24 NOTE — H&P
[address]   Electrophysiology Consultation   Date: 9/24/2018  Reason for Consultation: Atrial Fibrillation, ICD in situ   Consult Requesting Physician: Greg Mojica MD      No chief complaint on file. HPI: Carlene Samuel is a 74 y.o. female followed closely by Dr. Kashmir Lee for cardiomyopathy. She presents today to discuss generator change as BiV ICD has reached KALEB. She has a PMH of chronic atrial fibrillation and CVA, is currently anticoagulated with warfarin. She has had a sleep study that was positive for EDEN . Her most recent EF was 25-30% and she is on Entresto. Her device is monitored remotely through this office. Today, she has no complaints. She has to have dental procedures on 8/23. Past Medical History:   Diagnosis Date    CHF (congestive heart failure) (HCC)     CVA (cerebral infarction)     History of verrucae (wart) excision     Hyperlipidemia     Hypertension     Leg pain     Obstructive apnea     Unspecified cerebral artery occlusion with cerebral infarction     Valvular disease         Past Surgical History:   Procedure Laterality Date    ABSCESS DRAINAGE      CARDIAC DEFIBRILLATOR PLACEMENT         Allergies: Allergies   Allergen Reactions    Seasonal        Social History:   reports that she quit smoking about 37 years ago. She has a 0.50 pack-year smoking history. She has never used smokeless tobacco. She reports that she does not drink alcohol or use drugs. Family History:  family history includes Cancer in her mother; Stroke in her father. Reviewed. Denies family history of sudden cardiac death, arrhythmia, premature CAD    Review of System:  All other systems reviewed and are negative except for that noted above.  Pertinent negatives are:   General: negative for fever, chills   Ophthalmic ROS: negative for - eye pain or loss of vision  ENT ROS: negative for - headaches, sore throat   Respiratory: negative for - cough, sputum  Cardiovascular:

## 2018-10-01 ENCOUNTER — PROCEDURE VISIT (OUTPATIENT)
Dept: CARDIOLOGY CLINIC | Age: 73
End: 2018-10-01
Payer: MEDICARE

## 2018-10-01 DIAGNOSIS — Z95.810 AUTOMATIC IMPLANTABLE CARDIOVERTER-DEFIBRILLATOR IN SITU: Chronic | ICD-10-CM

## 2018-10-01 DIAGNOSIS — I42.9 CARDIOMYOPATHY, UNSPECIFIED TYPE (HCC): Chronic | ICD-10-CM

## 2018-10-01 PROCEDURE — 93284 PRGRMG EVAL IMPLANTABLE DFB: CPT | Performed by: INTERNAL MEDICINE

## 2018-10-15 ENCOUNTER — OFFICE VISIT (OUTPATIENT)
Dept: NEUROLOGY | Age: 73
End: 2018-10-15
Payer: MEDICARE

## 2018-10-15 VITALS
DIASTOLIC BLOOD PRESSURE: 76 MMHG | BODY MASS INDEX: 30.45 KG/M2 | HEART RATE: 75 BPM | HEIGHT: 67 IN | WEIGHT: 194 LBS | SYSTOLIC BLOOD PRESSURE: 135 MMHG

## 2018-10-15 DIAGNOSIS — G40.209 PARTIAL SYMPTOMATIC EPILEPSY WITH COMPLEX PARTIAL SEIZURES, NOT INTRACTABLE, WITHOUT STATUS EPILEPTICUS (HCC): Primary | ICD-10-CM

## 2018-10-15 DIAGNOSIS — I69.354 HEMIPARESIS AFFECTING LEFT SIDE AS LATE EFFECT OF CEREBROVASCULAR ACCIDENT (HCC): ICD-10-CM

## 2018-10-15 DIAGNOSIS — G47.33 OBSTRUCTIVE SLEEP APNEA: ICD-10-CM

## 2018-10-15 PROCEDURE — 99214 OFFICE O/P EST MOD 30 MIN: CPT | Performed by: PSYCHIATRY & NEUROLOGY

## 2018-10-15 RX ORDER — DIVALPROEX SODIUM 500 MG/1
TABLET, EXTENDED RELEASE ORAL
Qty: 90 TABLET | Refills: 1 | Status: SHIPPED | OUTPATIENT
Start: 2018-10-15 | End: 2019-04-15 | Stop reason: SDUPTHER

## 2018-10-15 RX ORDER — TOPIRAMATE 25 MG/1
TABLET ORAL
Qty: 180 TABLET | Refills: 1 | Status: SHIPPED | OUTPATIENT
Start: 2018-10-15 | End: 2019-10-07

## 2018-10-15 NOTE — PROGRESS NOTES
No    Sexual activity: No      Comment:      Other Topics Concern    None     Social History Narrative    None        Objective:  Exam:  /76   Pulse 75   Ht 5' 7\" (1.702 m)   Wt 194 lb (88 kg)   BMI 30.38 kg/m²   This is a well-nourished patient in no acute distress  Patient is awake, alert and oriented x3. Speech is normal.  Pupils are equal round reacting to light. Extraocular movements intact. Face symmetrical. Tongue midline. Motor exam shows mild left-sided weakness Deep tendon reflexes normal. Plantar reflexes downgoing. Sensory exam normal. Coordination normal. Gait slightly unsteady. No carotid bruit. No neck stiffness. Data :  LABS:  General Labs:    CBC:   Lab Results   Component Value Date    WBC 8.7 09/24/2018    RBC 4.48 09/04/2018    HGB 15.0 09/24/2018    HCT 46.0 09/04/2018    .8 09/04/2018    MCH 35.0 09/04/2018    MCHC 34.1 09/04/2018    RDW 13.9 09/04/2018     09/24/2018    MPV 8.1 09/04/2018     CMP:    Lab Results   Component Value Date     09/04/2018    K 4.2 09/04/2018     09/04/2018    CO2 22 09/04/2018    BUN 9 09/04/2018    CREATININE 0.9 09/24/2018    CREATININE 0.9 09/04/2018    GFRAA >60 09/24/2018    GFRAA >60 04/30/2013    AGRATIO 1.4 08/21/2017    LABGLOM >60 09/24/2018    GLUCOSE 101 09/04/2018    PROT 7.1 08/21/2017    PROT 7.4 10/11/2012    LABALBU 4.2 08/21/2017    CALCIUM 9.9 09/04/2018    BILITOT 0.4 08/21/2017    ALKPHOS 74 08/21/2017    AST 28 08/21/2017    ALT 26 08/21/2017     TSH:    Lab Results   Component Value Date    TSH 1.53 10/16/2013     Impression :  Partial complex seizures, stable  Depression, seems to be much better  Mild left-sided weakness from old stroke  Obstructive sleep apnea, on CPAP  Fatigue, improved  Migraine headaches, improved on topiramate    Plan :  Discussed with patient  Continue same dose of Depakote  Continue topiramate 25 mg twice daily  Side effects were discussed.   I will see her back in 6

## 2018-10-15 NOTE — PATIENT INSTRUCTIONS
Please call with any questions or concerns:   NOHEMI [facility]  @ [phone]. LAB RESULTS:  Please obtain any labs or diagnostic tests as discussed today. You should call the office to check the results. Please allow  3 to 7 days for us to get these results. MEDICATION LIST:  Please bring an accurate list of your medications to every visit. APPOINTMENT CONFIRMATION:  We will call you the day before your scheduled appointment to confirm. If we are unable to reach you, you MUST call back by the end of the day to confirm the appointment or we may be forced to cancel.

## 2018-10-16 ENCOUNTER — ANTI-COAG VISIT (OUTPATIENT)
Dept: PHARMACY | Age: 73
End: 2018-10-16
Payer: MEDICARE

## 2018-10-16 LAB — INTERNATIONAL NORMALIZATION RATIO, POC: 4

## 2018-10-16 PROCEDURE — 99211 OFF/OP EST MAY X REQ PHY/QHP: CPT

## 2018-10-16 PROCEDURE — 85610 PROTHROMBIN TIME: CPT

## 2018-10-30 ENCOUNTER — ANTI-COAG VISIT (OUTPATIENT)
Dept: PHARMACY | Age: 73
End: 2018-10-30
Payer: MEDICARE

## 2018-10-30 DIAGNOSIS — I63.9 CEREBRAL INFARCTION, UNSPECIFIED MECHANISM (HCC): ICD-10-CM

## 2018-10-30 DIAGNOSIS — Z79.01 CHRONIC ANTICOAGULATION: ICD-10-CM

## 2018-10-30 DIAGNOSIS — I48.91 ATRIAL FIBRILLATION, UNSPECIFIED TYPE (HCC): ICD-10-CM

## 2018-10-30 LAB — INTERNATIONAL NORMALIZATION RATIO, POC: 4.8

## 2018-10-30 PROCEDURE — 99212 OFFICE O/P EST SF 10 MIN: CPT

## 2018-10-30 PROCEDURE — 85610 PROTHROMBIN TIME: CPT

## 2018-11-13 ENCOUNTER — ANTI-COAG VISIT (OUTPATIENT)
Dept: PHARMACY | Age: 73
End: 2018-11-13
Payer: MEDICARE

## 2018-11-13 DIAGNOSIS — Z79.01 CHRONIC ANTICOAGULATION: ICD-10-CM

## 2018-11-13 DIAGNOSIS — I48.91 ATRIAL FIBRILLATION, UNSPECIFIED TYPE (HCC): ICD-10-CM

## 2018-11-13 DIAGNOSIS — I63.9 CEREBRAL INFARCTION, UNSPECIFIED MECHANISM (HCC): ICD-10-CM

## 2018-11-13 LAB — INTERNATIONAL NORMALIZATION RATIO, POC: 2.8

## 2018-11-13 PROCEDURE — 85610 PROTHROMBIN TIME: CPT

## 2018-11-13 PROCEDURE — 99211 OFF/OP EST MAY X REQ PHY/QHP: CPT

## 2018-11-26 RX ORDER — DIGOXIN 125 MCG
TABLET ORAL
Qty: 90 TABLET | Refills: 3 | Status: SHIPPED | OUTPATIENT
Start: 2018-11-26 | End: 2019-01-01 | Stop reason: SDUPTHER

## 2018-11-28 ENCOUNTER — OFFICE VISIT (OUTPATIENT)
Dept: CARDIOLOGY CLINIC | Age: 73
End: 2018-11-28
Payer: MEDICARE

## 2018-11-28 ENCOUNTER — HOSPITAL ENCOUNTER (OUTPATIENT)
Dept: NON INVASIVE DIAGNOSTICS | Age: 73
Discharge: HOME | End: 2018-11-28
Payer: MEDICARE

## 2018-11-28 VITALS
SYSTOLIC BLOOD PRESSURE: 96 MMHG | WEIGHT: 189 LBS | DIASTOLIC BLOOD PRESSURE: 62 MMHG | HEART RATE: 64 BPM | BODY MASS INDEX: 29.66 KG/M2 | HEIGHT: 67 IN

## 2018-11-28 DIAGNOSIS — Z95.810 AUTOMATIC IMPLANTABLE CARDIOVERTER-DEFIBRILLATOR IN SITU: Chronic | ICD-10-CM

## 2018-11-28 DIAGNOSIS — I50.22 SYSTOLIC CHF, CHRONIC (HCC): Chronic | ICD-10-CM

## 2018-11-28 DIAGNOSIS — E78.00 PURE HYPERCHOLESTEROLEMIA: ICD-10-CM

## 2018-11-28 DIAGNOSIS — I50.22 CHRONIC SYSTOLIC CONGESTIVE HEART FAILURE (HCC): ICD-10-CM

## 2018-11-28 DIAGNOSIS — Z95.810 AICD (AUTOMATIC CARDIOVERTER/DEFIBRILLATOR) PRESENT: Chronic | ICD-10-CM

## 2018-11-28 DIAGNOSIS — I42.8 NICM (NONISCHEMIC CARDIOMYOPATHY) (HCC): Chronic | ICD-10-CM

## 2018-11-28 DIAGNOSIS — I50.22 SYSTOLIC CHF, CHRONIC (HCC): Primary | Chronic | ICD-10-CM

## 2018-11-28 LAB
LV EF: 33 %
LVEF MODALITY: NORMAL

## 2018-11-28 PROCEDURE — 93306 TTE W/DOPPLER COMPLETE: CPT

## 2018-11-28 PROCEDURE — 99214 OFFICE O/P EST MOD 30 MIN: CPT | Performed by: INTERNAL MEDICINE

## 2018-11-28 PROCEDURE — 93290 INTERROG DEV EVAL ICPMS IP: CPT | Performed by: INTERNAL MEDICINE

## 2018-11-28 NOTE — PROGRESS NOTES
ventricle. Moderate mitral regurgitation. Moderate aortic valve regurgitation. Mild tricuspid and pulmonic regurgitation. RVSP estimated at 30 mmHg. Echo 4/21/14 -The left ventricle is dilated. There is global hypokinesis and the  estimated ejection fraction is 30-35%. Normal left ventricular wall  thickness. There is reversal of E/A inflow velocities across the mitral  valve suggesting impaired left ventricular relaxation. Moderate range mitral regurgitation is present. Mild-to-moderate aortic regurgitation is present. There is mild to moderate tricuspid regurgitation with RVSP estimated at   33    Echo Jan 2012 - LVEF 27%, + diastolic dysfunction, moderate MR, mild to moderate AI, mild TR (33). NYHA:  II  Allergies   Allergen Reactions    Seasonal      Current Outpatient Prescriptions   Medication Sig Dispense Refill    digoxin (LANOXIN) 125 MCG tablet TAKE 1 TABLET DAILY 90 tablet 3    divalproex (DEPAKOTE ER) 500 MG extended release tablet TAKE 1 TABLET DAILY 90 tablet 1    venlafaxine (EFFEXOR) 37.5 MG tablet TAKE 1 TABLET EVERY EVENING 90 tablet 3    simvastatin (ZOCOR) 20 MG tablet TAKE 1 TABLET NIGHTLY 90 tablet 3    carvedilol (COREG) 25 MG tablet TAKE ONE-HALF (1/2) TABLET (12.5 MG) IN THE MORNING AND 1 TABLET (25 MG) IN THE EVENING 135 tablet 3    sacubitril-valsartan (ENTRESTO) 24-26 MG per tablet Will take 1/2 tablet twice a day 90 tablet 0    zolpidem (AMBIEN) 10 MG tablet Take 10 mg by mouth nightly as needed.  warfarin (COUMADIN) 5 MG tablet As directed   (Patient taking differently: Managed by [facility]) 90 tablet 3    aspirin 81 MG EC tablet Take 81 mg by mouth daily.  topiramate (TOPAMAX) 25 MG tablet Take 1 tablet twice daily 180 tablet 1     No current facility-administered medications for this visit.         Immunization History   Administered Date(s) Administered    Influenza Vaccine, unspecified formulation 10/29/2018       Patient Active Problem the extremities. · Trace non-pitting edema  · Pedal Pulses: 2+ and equal   Abdomen:  · No masses or tenderness  · Liver/Spleen: No Abnormalities Noted  Neurological/Psychiatric:  · Alert and oriented in all spheres  · Moves all extremities well  · Exhibits normal gait balance and coordination  · No abnormalities of mood, affect, memory, mentation, or behavior are noted    Lab Data: Reviewed. Assessment:    1. Systolic CHF, chronic ([address]    2. Automatic implantable cardioverter-defibrillator in situ    3. AICD (automatic cardioverter/defibrillator) present      Plan:  Same medications. Labs today cbcd, cmp and bnp. April Cash RRTO in 4 months. Scribe's attestation: This note was scribed in the presence of Thierry Whyte M.D. by Alessandro Valenzuela RN     The scribe's documentation has been prepared under my direction and personally reviewed by me in its entirety. I confirm that the note above accurately reflects all work, treatment, procedures, and medical decision making performed by me. QUALITY MEASURES  1. Tobacco Cessation Counseling: NA  2. Retake of BP if >140/90:   NA  3. Documentation to PCP/referring for new patient:  Sent to PCP at close of office visit  4. CAD patient on anti-platelet: NA  5. CAD patient on STATIN therapy:  Yes  6.  Patient with CHF and aFib on anticoagulation:  Yes     I appreciate the opportunity of cooperating in the care of this individual.    Thierry Whyte M.D., [facility]

## 2018-12-11 ENCOUNTER — ANTI-COAG VISIT (OUTPATIENT)
Dept: PHARMACY | Age: 73
End: 2018-12-11
Payer: MEDICARE

## 2018-12-11 DIAGNOSIS — I63.9 CEREBRAL INFARCTION, UNSPECIFIED MECHANISM (HCC): ICD-10-CM

## 2018-12-11 DIAGNOSIS — I48.91 ATRIAL FIBRILLATION, UNSPECIFIED TYPE (HCC): ICD-10-CM

## 2018-12-11 DIAGNOSIS — Z79.01 CHRONIC ANTICOAGULATION: ICD-10-CM

## 2018-12-11 LAB — INTERNATIONAL NORMALIZATION RATIO, POC: 2.3

## 2018-12-11 PROCEDURE — 99211 OFF/OP EST MAY X REQ PHY/QHP: CPT

## 2018-12-11 PROCEDURE — 85610 PROTHROMBIN TIME: CPT

## 2019-01-01 ENCOUNTER — ANTI-COAG VISIT (OUTPATIENT)
Dept: PHARMACY | Age: 74
End: 2019-01-01
Payer: MEDICARE

## 2019-01-01 ENCOUNTER — HOSPITAL ENCOUNTER (OUTPATIENT)
Age: 74
Discharge: HOME | End: 2019-11-01
Payer: MEDICARE

## 2019-01-01 ENCOUNTER — OFFICE VISIT (OUTPATIENT)
Dept: CARDIOLOGY CLINIC | Age: 74
End: 2019-01-01
Payer: MEDICARE

## 2019-01-01 VITALS
HEIGHT: 67 IN | BODY MASS INDEX: 26.21 KG/M2 | OXYGEN SATURATION: 98 % | DIASTOLIC BLOOD PRESSURE: 60 MMHG | SYSTOLIC BLOOD PRESSURE: 132 MMHG | WEIGHT: 167 LBS | HEART RATE: 65 BPM

## 2019-01-01 DIAGNOSIS — Z79.01 CHRONIC ANTICOAGULATION: ICD-10-CM

## 2019-01-01 DIAGNOSIS — G47.33 OBSTRUCTIVE APNEA: ICD-10-CM

## 2019-01-01 DIAGNOSIS — E78.00 PURE HYPERCHOLESTEROLEMIA: ICD-10-CM

## 2019-01-01 DIAGNOSIS — R06.02 SOB (SHORTNESS OF BREATH): ICD-10-CM

## 2019-01-01 DIAGNOSIS — I50.22 SYSTOLIC CHF, CHRONIC (HCC): Chronic | ICD-10-CM

## 2019-01-01 DIAGNOSIS — I48.91 ATRIAL FIBRILLATION, UNSPECIFIED TYPE (HCC): ICD-10-CM

## 2019-01-01 DIAGNOSIS — Z95.810 AUTOMATIC IMPLANTABLE CARDIOVERTER-DEFIBRILLATOR IN SITU: Chronic | ICD-10-CM

## 2019-01-01 DIAGNOSIS — I42.9 CARDIOMYOPATHY, UNSPECIFIED TYPE (HCC): ICD-10-CM

## 2019-01-01 DIAGNOSIS — I50.22 SYSTOLIC CHF, CHRONIC (HCC): Primary | Chronic | ICD-10-CM

## 2019-01-01 DIAGNOSIS — I42.9 CARDIOMYOPATHY, UNSPECIFIED TYPE (HCC): Chronic | ICD-10-CM

## 2019-01-01 LAB
A/G RATIO: 1.4 (ref 1.1–2.2)
ALBUMIN SERPL-MCNC: 4.2 G/DL (ref 3.4–5)
ALP BLD-CCNC: 78 U/L (ref 40–129)
ALT SERPL-CCNC: 20 U/L (ref 10–40)
ANION GAP SERPL CALCULATED.3IONS-SCNC: 14 MMOL/L (ref 3–16)
AST SERPL-CCNC: 23 U/L (ref 15–37)
BILIRUB SERPL-MCNC: 0.6 MG/DL (ref 0–1)
BUN BLDV-MCNC: 15 MG/DL (ref 7–20)
CALCIUM SERPL-MCNC: 9.8 MG/DL (ref 8.3–10.6)
CHLORIDE BLD-SCNC: 100 MMOL/L (ref 99–110)
CHOLESTEROL, TOTAL: 176 MG/DL (ref 0–199)
CO2: 25 MMOL/L (ref 21–32)
CREAT SERPL-MCNC: 0.9 MG/DL (ref 0.6–1.2)
GFR AFRICAN AMERICAN: >60
GFR NON-AFRICAN AMERICAN: >60
GLOBULIN: 3 G/DL
GLUCOSE BLD-MCNC: 91 MG/DL (ref 70–99)
HCT VFR BLD CALC: 44.1 % (ref 36–48)
HDLC SERPL-MCNC: 64 MG/DL (ref 40–60)
HEMOGLOBIN: 15.1 G/DL (ref 12–16)
INTERNATIONAL NORMALIZATION RATIO, POC: 1.7
INTERNATIONAL NORMALIZATION RATIO, POC: 1.9
INTERNATIONAL NORMALIZATION RATIO, POC: 2.2
LDL CHOLESTEROL CALCULATED: 83 MG/DL
MCH RBC QN AUTO: 34.3 PG (ref 26–34)
MCHC RBC AUTO-ENTMCNC: 34.3 G/DL (ref 31–36)
MCV RBC AUTO: 99.9 FL (ref 80–100)
PDW BLD-RTO: 13.2 % (ref 12.4–15.4)
PLATELET # BLD: 152 K/UL (ref 135–450)
PMV BLD AUTO: 8.8 FL (ref 5–10.5)
POTASSIUM SERPL-SCNC: 4.5 MMOL/L (ref 3.5–5.1)
PRO-BNP: 2099 PG/ML (ref 0–449)
RBC # BLD: 4.42 M/UL (ref 4–5.2)
SODIUM BLD-SCNC: 139 MMOL/L (ref 136–145)
TOTAL PROTEIN: 7.2 G/DL (ref 6.4–8.2)
TRIGL SERPL-MCNC: 146 MG/DL (ref 0–150)
VLDLC SERPL CALC-MCNC: 29 MG/DL
WBC # BLD: 7.7 K/UL (ref 4–11)

## 2019-01-01 PROCEDURE — 80053 COMPREHEN METABOLIC PANEL: CPT

## 2019-01-01 PROCEDURE — 99214 OFFICE O/P EST MOD 30 MIN: CPT | Performed by: INTERNAL MEDICINE

## 2019-01-01 PROCEDURE — 85027 COMPLETE CBC AUTOMATED: CPT

## 2019-01-01 PROCEDURE — 99211 OFF/OP EST MAY X REQ PHY/QHP: CPT

## 2019-01-01 PROCEDURE — 83880 ASSAY OF NATRIURETIC PEPTIDE: CPT

## 2019-01-01 PROCEDURE — 36415 COLL VENOUS BLD VENIPUNCTURE: CPT

## 2019-01-01 PROCEDURE — 93290 INTERROG DEV EVAL ICPMS IP: CPT | Performed by: INTERNAL MEDICINE

## 2019-01-01 PROCEDURE — 99212 OFFICE O/P EST SF 10 MIN: CPT

## 2019-01-01 PROCEDURE — 80061 LIPID PANEL: CPT

## 2019-01-01 PROCEDURE — 85610 PROTHROMBIN TIME: CPT

## 2019-01-01 RX ORDER — DIGOXIN 125 MCG
TABLET ORAL
Qty: 90 TABLET | Refills: 4 | Status: SHIPPED | OUTPATIENT
Start: 2019-01-01

## 2019-01-08 ENCOUNTER — NURSE ONLY (OUTPATIENT)
Dept: CARDIOLOGY CLINIC | Age: 74
End: 2019-01-08
Payer: MEDICARE

## 2019-01-08 DIAGNOSIS — Z95.810 AUTOMATIC IMPLANTABLE CARDIOVERTER-DEFIBRILLATOR IN SITU: ICD-10-CM

## 2019-01-08 DIAGNOSIS — I50.22 SYSTOLIC CHF, CHRONIC (HCC): Chronic | ICD-10-CM

## 2019-01-08 DIAGNOSIS — I42.9 CARDIOMYOPATHY, UNSPECIFIED TYPE (HCC): Chronic | ICD-10-CM

## 2019-01-08 PROCEDURE — 93295 DEV INTERROG REMOTE 1/2/MLT: CPT | Performed by: INTERNAL MEDICINE

## 2019-01-08 PROCEDURE — 93296 REM INTERROG EVL PM/IDS: CPT | Performed by: INTERNAL MEDICINE

## 2019-01-08 PROCEDURE — 93297 REM INTERROG DEV EVAL ICPMS: CPT | Performed by: INTERNAL MEDICINE

## 2019-01-15 ENCOUNTER — ANTI-COAG VISIT (OUTPATIENT)
Dept: PHARMACY | Age: 74
End: 2019-01-15
Payer: MEDICARE

## 2019-01-15 DIAGNOSIS — I48.91 ATRIAL FIBRILLATION, UNSPECIFIED TYPE (HCC): ICD-10-CM

## 2019-01-15 DIAGNOSIS — I63.9 CEREBRAL INFARCTION, UNSPECIFIED MECHANISM (HCC): ICD-10-CM

## 2019-01-15 DIAGNOSIS — Z79.01 CHRONIC ANTICOAGULATION: ICD-10-CM

## 2019-01-15 LAB — INTERNATIONAL NORMALIZATION RATIO, POC: 3.1

## 2019-01-15 PROCEDURE — 99211 OFF/OP EST MAY X REQ PHY/QHP: CPT

## 2019-01-15 PROCEDURE — 85610 PROTHROMBIN TIME: CPT

## 2019-02-19 ENCOUNTER — ANTI-COAG VISIT (OUTPATIENT)
Dept: PHARMACY | Age: 74
End: 2019-02-19
Payer: MEDICARE

## 2019-02-19 DIAGNOSIS — I63.9 CEREBRAL INFARCTION, UNSPECIFIED MECHANISM (HCC): ICD-10-CM

## 2019-02-19 DIAGNOSIS — Z79.01 CHRONIC ANTICOAGULATION: ICD-10-CM

## 2019-02-19 DIAGNOSIS — I48.91 ATRIAL FIBRILLATION, UNSPECIFIED TYPE (HCC): ICD-10-CM

## 2019-02-19 LAB — INTERNATIONAL NORMALIZATION RATIO, POC: 4.5

## 2019-02-19 PROCEDURE — 99212 OFFICE O/P EST SF 10 MIN: CPT

## 2019-02-19 PROCEDURE — 85610 PROTHROMBIN TIME: CPT

## 2019-03-12 ENCOUNTER — OFFICE VISIT (OUTPATIENT)
Dept: PULMONOLOGY | Age: 74
End: 2019-03-12
Payer: MEDICARE

## 2019-03-12 ENCOUNTER — ANTI-COAG VISIT (OUTPATIENT)
Dept: PHARMACY | Age: 74
End: 2019-03-12
Payer: MEDICARE

## 2019-03-12 VITALS
HEART RATE: 70 BPM | BODY MASS INDEX: 29.66 KG/M2 | HEIGHT: 67 IN | SYSTOLIC BLOOD PRESSURE: 100 MMHG | WEIGHT: 189 LBS | DIASTOLIC BLOOD PRESSURE: 60 MMHG | OXYGEN SATURATION: 98 %

## 2019-03-12 DIAGNOSIS — I48.91 ATRIAL FIBRILLATION, UNSPECIFIED TYPE (HCC): ICD-10-CM

## 2019-03-12 DIAGNOSIS — G47.33 OBSTRUCTIVE APNEA: Primary | Chronic | ICD-10-CM

## 2019-03-12 DIAGNOSIS — I63.9 CEREBRAL INFARCTION, UNSPECIFIED MECHANISM (HCC): ICD-10-CM

## 2019-03-12 DIAGNOSIS — I48.91 ATRIAL FIBRILLATION, UNSPECIFIED TYPE (HCC): Chronic | ICD-10-CM

## 2019-03-12 DIAGNOSIS — Z79.01 CHRONIC ANTICOAGULATION: ICD-10-CM

## 2019-03-12 LAB — INTERNATIONAL NORMALIZATION RATIO, POC: 3.9

## 2019-03-12 PROCEDURE — 99212 OFFICE O/P EST SF 10 MIN: CPT | Performed by: NURSE PRACTITIONER

## 2019-03-12 PROCEDURE — 99211 OFF/OP EST MAY X REQ PHY/QHP: CPT

## 2019-03-12 PROCEDURE — 85610 PROTHROMBIN TIME: CPT

## 2019-03-12 ASSESSMENT — SLEEP AND FATIGUE QUESTIONNAIRES
HOW LIKELY ARE YOU TO NOD OFF OR FALL ASLEEP WHEN YOU ARE A PASSENGER IN A CAR FOR AN HOUR WITHOUT A BREAK: 0
HOW LIKELY ARE YOU TO NOD OFF OR FALL ASLEEP WHILE LYING DOWN TO REST IN THE AFTERNOON WHEN CIRCUMSTANCES PERMIT: 1
HOW LIKELY ARE YOU TO NOD OFF OR FALL ASLEEP WHILE SITTING AND READING: 0
HOW LIKELY ARE YOU TO NOD OFF OR FALL ASLEEP IN A CAR, WHILE STOPPED FOR A FEW MINUTES IN TRAFFIC: 0
HOW LIKELY ARE YOU TO NOD OFF OR FALL ASLEEP WHILE WATCHING TV: 1
ESS TOTAL SCORE: 2
HOW LIKELY ARE YOU TO NOD OFF OR FALL ASLEEP WHILE SITTING INACTIVE IN A PUBLIC PLACE: 0
HOW LIKELY ARE YOU TO NOD OFF OR FALL ASLEEP WHILE SITTING QUIETLY AFTER LUNCH WITHOUT ALCOHOL: 0
HOW LIKELY ARE YOU TO NOD OFF OR FALL ASLEEP WHILE SITTING AND TALKING TO SOMEONE: 0

## 2019-03-12 ASSESSMENT — ENCOUNTER SYMPTOMS
EYE PAIN: 0
EYE REDNESS: 0
APNEA: 0
SINUS PRESSURE: 0
RHINORRHEA: 0
ABDOMINAL PAIN: 0
ABDOMINAL DISTENTION: 0
COUGH: 0
SHORTNESS OF BREATH: 0

## 2019-03-25 RX ORDER — CARVEDILOL 25 MG/1
TABLET ORAL
Qty: 135 TABLET | Refills: 3 | Status: SHIPPED | OUTPATIENT
Start: 2019-03-25 | End: 2020-01-01

## 2019-04-02 ENCOUNTER — HOSPITAL ENCOUNTER (OUTPATIENT)
Age: 74
Discharge: HOME | End: 2019-04-02
Payer: MEDICARE

## 2019-04-02 ENCOUNTER — ANTI-COAG VISIT (OUTPATIENT)
Dept: PHARMACY | Age: 74
End: 2019-04-02
Payer: MEDICARE

## 2019-04-02 DIAGNOSIS — Z79.01 CHRONIC ANTICOAGULATION: ICD-10-CM

## 2019-04-02 DIAGNOSIS — Z95.810 AICD (AUTOMATIC CARDIOVERTER/DEFIBRILLATOR) PRESENT: Chronic | ICD-10-CM

## 2019-04-02 DIAGNOSIS — I48.91 ATRIAL FIBRILLATION, UNSPECIFIED TYPE (HCC): ICD-10-CM

## 2019-04-02 DIAGNOSIS — Z95.810 AUTOMATIC IMPLANTABLE CARDIOVERTER-DEFIBRILLATOR IN SITU: Chronic | ICD-10-CM

## 2019-04-02 DIAGNOSIS — I50.22 SYSTOLIC CHF, CHRONIC (HCC): Chronic | ICD-10-CM

## 2019-04-02 DIAGNOSIS — I63.9 CEREBRAL INFARCTION, UNSPECIFIED MECHANISM (HCC): ICD-10-CM

## 2019-04-02 LAB
A/G RATIO: 1.2 (ref 1.1–2.2)
ALBUMIN SERPL-MCNC: 4.2 G/DL (ref 3.4–5)
ALP BLD-CCNC: 93 U/L (ref 40–129)
ALT SERPL-CCNC: 18 U/L (ref 10–40)
ANION GAP SERPL CALCULATED.3IONS-SCNC: 13 MMOL/L (ref 3–16)
AST SERPL-CCNC: 23 U/L (ref 15–37)
BASOPHILS ABSOLUTE: 0.1 K/UL (ref 0–0.2)
BASOPHILS RELATIVE PERCENT: 0.7 %
BILIRUB SERPL-MCNC: 0.5 MG/DL (ref 0–1)
BUN BLDV-MCNC: 16 MG/DL (ref 7–20)
CALCIUM SERPL-MCNC: 9.9 MG/DL (ref 8.3–10.6)
CHLORIDE BLD-SCNC: 103 MMOL/L (ref 99–110)
CO2: 24 MMOL/L (ref 21–32)
CREAT SERPL-MCNC: 1 MG/DL (ref 0.6–1.2)
EOSINOPHILS ABSOLUTE: 0.1 K/UL (ref 0–0.6)
EOSINOPHILS RELATIVE PERCENT: 0.8 %
GFR AFRICAN AMERICAN: >60
GFR NON-AFRICAN AMERICAN: 54
GLOBULIN: 3.4 G/DL
GLUCOSE BLD-MCNC: 109 MG/DL (ref 70–99)
HCT VFR BLD CALC: 46.3 % (ref 36–48)
HEMOGLOBIN: 15.8 G/DL (ref 12–16)
INTERNATIONAL NORMALIZATION RATIO, POC: 2.6
LYMPHOCYTES ABSOLUTE: 3.2 K/UL (ref 1–5.1)
LYMPHOCYTES RELATIVE PERCENT: 38.5 %
MCH RBC QN AUTO: 34 PG (ref 26–34)
MCHC RBC AUTO-ENTMCNC: 34.1 G/DL (ref 31–36)
MCV RBC AUTO: 99.7 FL (ref 80–100)
MONOCYTES ABSOLUTE: 1.1 K/UL (ref 0–1.3)
MONOCYTES RELATIVE PERCENT: 12.9 %
NEUTROPHILS ABSOLUTE: 3.9 K/UL (ref 1.7–7.7)
NEUTROPHILS RELATIVE PERCENT: 47.1 %
PDW BLD-RTO: 13.5 % (ref 12.4–15.4)
PLATELET # BLD: 174 K/UL (ref 135–450)
PMV BLD AUTO: 8.6 FL (ref 5–10.5)
POTASSIUM SERPL-SCNC: 4.9 MMOL/L (ref 3.5–5.1)
PRO-BNP: 1439 PG/ML (ref 0–449)
RBC # BLD: 4.64 M/UL (ref 4–5.2)
SODIUM BLD-SCNC: 140 MMOL/L (ref 136–145)
TOTAL PROTEIN: 7.6 G/DL (ref 6.4–8.2)
WBC # BLD: 8.2 K/UL (ref 4–11)

## 2019-04-02 PROCEDURE — 80053 COMPREHEN METABOLIC PANEL: CPT

## 2019-04-02 PROCEDURE — 36415 COLL VENOUS BLD VENIPUNCTURE: CPT

## 2019-04-02 PROCEDURE — 85025 COMPLETE CBC W/AUTO DIFF WBC: CPT

## 2019-04-02 PROCEDURE — 85610 PROTHROMBIN TIME: CPT

## 2019-04-02 PROCEDURE — 83880 ASSAY OF NATRIURETIC PEPTIDE: CPT

## 2019-04-02 PROCEDURE — 99211 OFF/OP EST MAY X REQ PHY/QHP: CPT

## 2019-04-09 ENCOUNTER — NURSE ONLY (OUTPATIENT)
Dept: CARDIOLOGY CLINIC | Age: 74
End: 2019-04-09
Payer: MEDICARE

## 2019-04-09 DIAGNOSIS — I50.22 SYSTOLIC CHF, CHRONIC (HCC): Chronic | ICD-10-CM

## 2019-04-09 DIAGNOSIS — Z95.810 AUTOMATIC IMPLANTABLE CARDIOVERTER-DEFIBRILLATOR IN SITU: ICD-10-CM

## 2019-04-09 DIAGNOSIS — I42.9 CARDIOMYOPATHY, UNSPECIFIED TYPE (HCC): Chronic | ICD-10-CM

## 2019-04-09 PROCEDURE — 93296 REM INTERROG EVL PM/IDS: CPT | Performed by: INTERNAL MEDICINE

## 2019-04-09 PROCEDURE — 93297 REM INTERROG DEV EVAL ICPMS: CPT | Performed by: INTERNAL MEDICINE

## 2019-04-09 PROCEDURE — 93295 DEV INTERROG REMOTE 1/2/MLT: CPT | Performed by: INTERNAL MEDICINE

## 2019-04-09 RX ORDER — HYDROCHLOROTHIAZIDE 25 MG/1
25 TABLET ORAL EVERY MORNING
Qty: 90 TABLET | Refills: 0 | Status: CANCELLED | OUTPATIENT
Start: 2019-04-09

## 2019-04-09 NOTE — LETTER
[address] [facility] [phone]  [address] [place] [address]    Pacemaker/Defibrillator Clinic          04/09/19        Oliver Lujan  [address] [facility] [address]        Dear Oliver Lujan    This letter is to inform you that we received the transmission from your monitor at home that checks your pacemaker and/or defibrillator, or implanted heart monitor. The next date your monitor will automatically transmit will be 7/16/19. Please do not send additional routine transmissions unless specifically requested. Your device and monitor are wireless and most transmit cellularly, but please periodically check your monitor is still plugged in to the electrical outlet. If you still use the telephone land line to send please ensure the connection to the phone tj is secure. This will help to ensure successful automatic transmissions in the future. Also, the monitor needs to be close to you while sleeping at night. Please be aware that the remote device transmission sites are periodically monitored only during regular business hours during which simultaneous in-office device clinics are being run. If your transmission requires attention, we will contact you as soon as possible. Thank you.             [facility]

## 2019-04-09 NOTE — TELEPHONE ENCOUNTER
----- Message from Ana Yuan MD sent at 4/8/2019  5:19 PM EDT -----  Call pt. Her BNP level continues to increase making me think she is holding more water. I want her to add HCtz 25 mg po qd. Repeat bmp and bnp in 1 month. [place]    Called and reviewed with patient. HCTZ pended to pharmacy and lab order sent to home address. She verbalized understanding.

## 2019-04-10 RX ORDER — HYDROCHLOROTHIAZIDE 25 MG/1
25 TABLET ORAL EVERY MORNING
Qty: 30 TABLET | Refills: 5 | Status: SHIPPED | OUTPATIENT
Start: 2019-04-10 | End: 2019-04-11 | Stop reason: SDUPTHER

## 2019-04-11 ENCOUNTER — TELEPHONE (OUTPATIENT)
Dept: CARDIOLOGY CLINIC | Age: 74
End: 2019-04-11

## 2019-04-11 RX ORDER — HYDROCHLOROTHIAZIDE 25 MG/1
25 TABLET ORAL EVERY MORNING
Qty: 90 TABLET | Refills: 3 | Status: SHIPPED | OUTPATIENT
Start: 2019-04-11

## 2019-04-11 NOTE — TELEPHONE ENCOUNTER
Previous prescription sent to [facility] and pt wants local pharmacy. Spoke with pt advising her of the above. Also discussed funding for Leonid Remington. She was previously helped by [employer] through [employer]. She stated that her danie ran out and she did not renew. I advised she should contact [facility] and gave the number ([phone]). I asked her if she had enough medication on hand at home and she that she did in fact have medication.

## 2019-04-11 NOTE — TELEPHONE ENCOUNTER
Pt states hydrochlorothiazide was sent to wrong pharmacy. Please send to her [address] [facility], [address] [place] [address] Se - F [phone]. Pt also states Entresto was too expensive. Please advise.

## 2019-04-15 ENCOUNTER — OFFICE VISIT (OUTPATIENT)
Dept: NEUROLOGY | Age: 74
End: 2019-04-15
Payer: MEDICARE

## 2019-04-15 VITALS
SYSTOLIC BLOOD PRESSURE: 108 MMHG | BODY MASS INDEX: 26.94 KG/M2 | WEIGHT: 172 LBS | HEART RATE: 72 BPM | DIASTOLIC BLOOD PRESSURE: 64 MMHG

## 2019-04-15 DIAGNOSIS — I69.354 HEMIPARESIS AFFECTING LEFT SIDE AS LATE EFFECT OF CEREBROVASCULAR ACCIDENT (HCC): ICD-10-CM

## 2019-04-15 DIAGNOSIS — G40.209 PARTIAL SYMPTOMATIC EPILEPSY WITH COMPLEX PARTIAL SEIZURES, NOT INTRACTABLE, WITHOUT STATUS EPILEPTICUS (HCC): Primary | ICD-10-CM

## 2019-04-15 DIAGNOSIS — G20 PARKINSONIAN TREMOR (HCC): ICD-10-CM

## 2019-04-15 PROBLEM — G20.C PARKINSONIAN TREMOR: Status: ACTIVE | Noted: 2019-04-15

## 2019-04-15 PROCEDURE — 99214 OFFICE O/P EST MOD 30 MIN: CPT | Performed by: PSYCHIATRY & NEUROLOGY

## 2019-04-15 RX ORDER — DIVALPROEX SODIUM 500 MG/1
TABLET, EXTENDED RELEASE ORAL
Qty: 90 TABLET | Refills: 1 | Status: SHIPPED | OUTPATIENT
Start: 2019-04-15 | End: 2019-10-07 | Stop reason: SDUPTHER

## 2019-04-15 NOTE — PATIENT INSTRUCTIONS
Please call with any questions or concerns:   SSMARÍA [facility]  @ [phone]. LAB RESULTS:  Please obtain any labs or diagnostic tests as discussed today. You should call the office to check the results. Please allow  3 to 7 days for us to get these results. MEDICATION LIST:  Please bring an accurate list of your medications to every visit. APPOINTMENT CONFIRMATION:  We will call you the day before your scheduled appointment to confirm. If we are unable to reach you, you MUST call back by the end of the day to confirm the appointment or we may be forced to cancel.

## 2019-04-15 NOTE — PROGRESS NOTES
children: None    Years of education: None    Highest education level: None   Occupational History    None   Social Needs    Financial resource strain: None    Food insecurity:     Worry: None     Inability: None    Transportation needs:     Medical: None     Non-medical: None   Tobacco Use    Smoking status: Former Smoker     Packs/day: 0.25     Years: 2.00     Pack years: 0.50     Last attempt to quit: 1981     Years since quittin.6    Smokeless tobacco: Never Used   Substance and Sexual Activity    Alcohol use: No    Drug use: No    Sexual activity: Never     Comment:    Lifestyle    Physical activity:     Days per week: None     Minutes per session: None    Stress: None   Relationships    Social connections:     Talks on phone: None     Gets together: None     Attends Worship service: None     Active member of club or organization: None     Attends meetings of clubs or organizations: None     Relationship status: None    Intimate partner violence:     Fear of current or ex partner: None     Emotionally abused: None     Physically abused: None     Forced sexual activity: None   Other Topics Concern    None   Social History Narrative    None        Objective:  Exam:  /64   Pulse 72   Wt 172 lb (78 kg)   BMI 26.94 kg/m²   This is a well-nourished patient in no acute distress  Patient is awake, alert and oriented x3. Speech is normal.  Pupils are equal round reacting to light. Extraocular movements intact. Face symmetrical. Tongue midline. Motor exam shows mild left-sided weakness Patient has cogwheel rigidity and some pill-rolling tremors in the right hand. Deep tendon reflexes normal. Plantar reflexes downgoing. Sensory exam normal. Coordination normal. Gait slightly unsteady. No carotid bruit. No neck stiffness.     Data :  LABS:  General Labs:    CBC:   Lab Results   Component Value Date    WBC 8.2 2019    RBC 4.64 2019    HGB 15.8 2019    HCT 46.3 04/02/2019    MCV 99.7 04/02/2019    MCH 34.0 04/02/2019    MCHC 34.1 04/02/2019    RDW 13.5 04/02/2019     04/02/2019    MPV 8.6 04/02/2019     CMP:    Lab Results   Component Value Date     04/02/2019    K 4.9 04/02/2019     04/02/2019    CO2 24 04/02/2019    BUN 16 04/02/2019    CREATININE 1.0 04/02/2019    GFRAA >60 04/02/2019    GFRAA >60 04/30/2013    AGRATIO 1.2 04/02/2019    LABGLOM 54 04/02/2019    GLUCOSE 109 04/02/2019    PROT 7.6 04/02/2019    PROT 7.4 10/11/2012    LABALBU 4.2 04/02/2019    CALCIUM 9.9 04/02/2019    BILITOT 0.5 04/02/2019    ALKPHOS 93 04/02/2019    AST 23 04/02/2019    ALT 18 04/02/2019     TSH:    Lab Results   Component Value Date    TSH 1.53 10/16/2013     Impression :  Partial complex seizures, stable  Depression, seems to be much better  Mild left-sided weakness from old stroke  Obstructive sleep apnea, on CPAP  Fatigue, improved  Migraine headaches, improved on topiramate  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 Plan :  Discussed with patient  Continue same dose of Depakote  Continue topiramate 25 mg twice daily  Side effects were discussed. We discussed about treatment for the parkinsonian tremors. Since the symptoms are mild at this time, we decided to wait. I will see her back in 6 months        Please note a portion of  this chart was generated using dragon dictation software. Although every effort was made to ensure the accuracy of this automated transcription, some errors in transcription may have occurred.

## 2019-04-26 RX ORDER — SIMVASTATIN 20 MG
TABLET ORAL
Qty: 90 TABLET | Refills: 3 | Status: SHIPPED | OUTPATIENT
Start: 2019-04-26 | End: 2020-01-01

## 2019-05-03 ENCOUNTER — HOSPITAL ENCOUNTER (OUTPATIENT)
Age: 74
Discharge: HOME | End: 2019-05-03
Payer: MEDICARE

## 2019-05-03 DIAGNOSIS — I50.22 SYSTOLIC CHF, CHRONIC (HCC): Chronic | ICD-10-CM

## 2019-05-03 LAB
ANION GAP SERPL CALCULATED.3IONS-SCNC: 14 MMOL/L (ref 3–16)
BUN BLDV-MCNC: 15 MG/DL (ref 7–20)
CALCIUM SERPL-MCNC: 9.8 MG/DL (ref 8.3–10.6)
CHLORIDE BLD-SCNC: 102 MMOL/L (ref 99–110)
CO2: 24 MMOL/L (ref 21–32)
CREAT SERPL-MCNC: 1 MG/DL (ref 0.6–1.2)
GFR AFRICAN AMERICAN: >60
GFR NON-AFRICAN AMERICAN: 54
GLUCOSE BLD-MCNC: 134 MG/DL (ref 70–99)
POTASSIUM SERPL-SCNC: 4.7 MMOL/L (ref 3.5–5.1)
PRO-BNP: 1417 PG/ML (ref 0–449)
SODIUM BLD-SCNC: 140 MMOL/L (ref 136–145)

## 2019-05-03 PROCEDURE — 80048 BASIC METABOLIC PNL TOTAL CA: CPT

## 2019-05-03 PROCEDURE — 83880 ASSAY OF NATRIURETIC PEPTIDE: CPT

## 2019-05-03 PROCEDURE — 36415 COLL VENOUS BLD VENIPUNCTURE: CPT

## 2019-05-10 ENCOUNTER — ANTI-COAG VISIT (OUTPATIENT)
Dept: PHARMACY | Age: 74
End: 2019-05-10
Payer: MEDICARE

## 2019-05-10 DIAGNOSIS — I48.91 ATRIAL FIBRILLATION, UNSPECIFIED TYPE (HCC): ICD-10-CM

## 2019-05-10 DIAGNOSIS — I63.9 CEREBRAL INFARCTION, UNSPECIFIED MECHANISM (HCC): ICD-10-CM

## 2019-05-10 DIAGNOSIS — Z79.01 CHRONIC ANTICOAGULATION: ICD-10-CM

## 2019-05-10 LAB — INTERNATIONAL NORMALIZATION RATIO, POC: 2.1

## 2019-05-10 PROCEDURE — 99211 OFF/OP EST MAY X REQ PHY/QHP: CPT

## 2019-05-10 PROCEDURE — 85610 PROTHROMBIN TIME: CPT

## 2019-05-10 NOTE — PROGRESS NOTES
Ms. Marcell Koyanagi is a 74 y.o. y/o female with history of Afib, CVA in 2008   She presents today for anticoagulation monitoring and adjustment. Pertinent PMH: CHF, ICD defibrillator/ pacemaker. 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 Patient Reported Findings:  Yes     No  [x]   [x]       Patient verifies current dosing regimen as listed    []   [x]       S/S bleeding/bruising/swelling/SOB  []   [x]       Blood in urine or stool  []   [x]       Procedures scheduled in the future at this time.  []   [x]       Missed Dose  []   [x]       Extra Dose   []   [x]       Change in medications   []   [x]       Change in health/diet/appetite. She does not eat many vegetables generally. Has lost about 30lbs by eliminating fast food and soft drinks.  []   [x]       Change in alcohol use Does not drink alcohol  []   [x]       Change in activity  []   [x]       Hospital admission  []   [x]       Emergency department visit  [x]   []       Other complaints Has increased R hand tremors and L leg is somewhat less responsive. She reports that Dr. Danyel Vasquez does not want to give her any additional medications d/t her condition and current medication regimen. Clinical Outcomes:  Yes     No  []   [x]       Major bleeding event  []   [x]       Thromboembolic event    Duration of warfarin Therapy: indefinite  INR Range:  2.0-3.0    INR is 2.1 today   Continue weekly dose of 2.5 mg on Mon & Fri and 1.25 mg all other days  Encouraged to maintain a consistency of vegetables/salads.   Recheck INR in 5 weeks on 6/14    Referring cardiologist is Dr. Tod Dumont  INR (no units)   Date Value   05/10/2019 2.1   04/02/2019 2.6   03/12/2019 3.9   02/19/2019 4.5   09/24/2018 3.20 (H)   09/18/2018 2.8   08/29/2018 2.1   07/30/2018 1.9

## 2019-05-13 ENCOUNTER — OFFICE VISIT (OUTPATIENT)
Dept: CARDIOLOGY CLINIC | Age: 74
End: 2019-05-13
Payer: MEDICARE

## 2019-05-13 VITALS
BODY MASS INDEX: 27.72 KG/M2 | HEART RATE: 67 BPM | WEIGHT: 177 LBS | SYSTOLIC BLOOD PRESSURE: 108 MMHG | DIASTOLIC BLOOD PRESSURE: 64 MMHG | OXYGEN SATURATION: 98 %

## 2019-05-13 DIAGNOSIS — Z95.810 AUTOMATIC IMPLANTABLE CARDIOVERTER-DEFIBRILLATOR IN SITU: Chronic | ICD-10-CM

## 2019-05-13 DIAGNOSIS — Z95.810 AICD (AUTOMATIC CARDIOVERTER/DEFIBRILLATOR) PRESENT: Chronic | ICD-10-CM

## 2019-05-13 DIAGNOSIS — G47.33 OBSTRUCTIVE APNEA: Chronic | ICD-10-CM

## 2019-05-13 DIAGNOSIS — I42.9 CARDIOMYOPATHY, UNSPECIFIED TYPE (HCC): Chronic | ICD-10-CM

## 2019-05-13 DIAGNOSIS — E78.00 PURE HYPERCHOLESTEROLEMIA: ICD-10-CM

## 2019-05-13 DIAGNOSIS — I48.91 ATRIAL FIBRILLATION, UNSPECIFIED TYPE (HCC): Chronic | ICD-10-CM

## 2019-05-13 DIAGNOSIS — I50.22 SYSTOLIC CHF, CHRONIC (HCC): Primary | Chronic | ICD-10-CM

## 2019-05-13 PROCEDURE — 93290 INTERROG DEV EVAL ICPMS IP: CPT | Performed by: INTERNAL MEDICINE

## 2019-05-13 PROCEDURE — 99214 OFFICE O/P EST MOD 30 MIN: CPT | Performed by: INTERNAL MEDICINE

## 2019-06-14 ENCOUNTER — ANTI-COAG VISIT (OUTPATIENT)
Dept: PHARMACY | Age: 74
End: 2019-06-14
Payer: MEDICARE

## 2019-06-14 DIAGNOSIS — I63.9 CEREBRAL INFARCTION, UNSPECIFIED MECHANISM (HCC): ICD-10-CM

## 2019-06-14 DIAGNOSIS — Z79.01 CHRONIC ANTICOAGULATION: ICD-10-CM

## 2019-06-14 DIAGNOSIS — I48.91 ATRIAL FIBRILLATION, UNSPECIFIED TYPE (HCC): ICD-10-CM

## 2019-06-14 LAB — INTERNATIONAL NORMALIZATION RATIO, POC: 2.9

## 2019-06-14 PROCEDURE — 99211 OFF/OP EST MAY X REQ PHY/QHP: CPT

## 2019-06-14 PROCEDURE — 85610 PROTHROMBIN TIME: CPT

## 2019-07-16 ENCOUNTER — NURSE ONLY (OUTPATIENT)
Dept: CARDIOLOGY CLINIC | Age: 74
End: 2019-07-16
Payer: MEDICARE

## 2019-07-16 DIAGNOSIS — Z95.810 AUTOMATIC IMPLANTABLE CARDIOVERTER-DEFIBRILLATOR IN SITU: ICD-10-CM

## 2019-07-16 DIAGNOSIS — I42.9 CARDIOMYOPATHY, UNSPECIFIED TYPE (HCC): Chronic | ICD-10-CM

## 2019-07-16 DIAGNOSIS — I50.22 SYSTOLIC CHF, CHRONIC (HCC): Chronic | ICD-10-CM

## 2019-07-16 PROCEDURE — 93297 REM INTERROG DEV EVAL ICPMS: CPT | Performed by: INTERNAL MEDICINE

## 2019-07-16 PROCEDURE — 93296 REM INTERROG EVL PM/IDS: CPT | Performed by: INTERNAL MEDICINE

## 2019-07-16 PROCEDURE — 93295 DEV INTERROG REMOTE 1/2/MLT: CPT | Performed by: INTERNAL MEDICINE

## 2019-07-19 ENCOUNTER — ANTI-COAG VISIT (OUTPATIENT)
Dept: PHARMACY | Age: 74
End: 2019-07-19
Payer: MEDICARE

## 2019-07-19 DIAGNOSIS — I48.91 ATRIAL FIBRILLATION, UNSPECIFIED TYPE (HCC): ICD-10-CM

## 2019-07-19 DIAGNOSIS — Z79.01 CHRONIC ANTICOAGULATION: ICD-10-CM

## 2019-07-19 DIAGNOSIS — I63.9 CEREBRAL INFARCTION, UNSPECIFIED MECHANISM (HCC): ICD-10-CM

## 2019-07-19 LAB — INTERNATIONAL NORMALIZATION RATIO, POC: 2.4

## 2019-07-19 PROCEDURE — 85610 PROTHROMBIN TIME: CPT

## 2019-07-19 PROCEDURE — 99211 OFF/OP EST MAY X REQ PHY/QHP: CPT

## 2019-07-19 NOTE — PROGRESS NOTES
Ms. Mary Carlson is a 74 y.o. y/o female with history of Afib, CVA in 2008   She presents today for anticoagulation monitoring and adjustment. Pertinent PMH: CHF, ICD defibrillator/ pacemaker. 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 Patient Reported Findings:  Yes     No  [x]   [x]       Patient verifies current dosing regimen as listed    []   [x]       S/S bleeding/bruising/swelling/SOB  []   [x]       Blood in urine or stool  []   [x]       Procedures scheduled in the future at this time.  []   [x]       Missed Dose  []   [x]       Extra Dose   []   [x]       Change in medications    []   [x]       Change in health/diet/appetite. She does not eat many vegetables generally. Has lost about 30lbs by eliminating fast food and soft drinks. --> reports about 1-2 servings of greens per week  []   [x]       Change in alcohol use Does not drink alcohol  []   [x]       Change in activity  []   [x]       Hospital admission  []   [x]       Emergency department visit  [x]   []       Other complaints Has increased R hand tremors and L leg is somewhat less responsive. She reports that Dr. Harinder Riggs does not want to give her any additional medications d/t her condition and current medication regimen. Clinical Outcomes:  Yes     No  []   [x]       Major bleeding event  []   [x]       Thromboembolic event    Duration of warfarin Therapy: indefinite  INR Range:  2.0-3.0    INR is 2.4 today   Continue weekly dose of 2.5 mg on Mon & Fri and 1.25 mg all other days  Encouraged to maintain a consistency of vegetables/salads.   Recheck INR in 5 weeks on 8/23    Referring cardiologist is Dr. Toi Barbour  INR (no units)   Date Value   07/19/2019 2.4   06/14/2019 2.9   05/10/2019 2.1   04/02/2019 2.6   09/24/2018 3.20 (H)   09/18/2018 2.8   08/29/2018 2.1   07/30/2018 1.9

## 2019-08-13 ENCOUNTER — TELEPHONE (OUTPATIENT)
Dept: CARDIOLOGY CLINIC | Age: 74
End: 2019-08-13

## 2019-08-23 ENCOUNTER — ANTI-COAG VISIT (OUTPATIENT)
Dept: PHARMACY | Age: 74
End: 2019-08-23
Payer: MEDICARE

## 2019-08-23 DIAGNOSIS — I63.9 CEREBRAL INFARCTION, UNSPECIFIED MECHANISM (HCC): ICD-10-CM

## 2019-08-23 DIAGNOSIS — I48.91 ATRIAL FIBRILLATION, UNSPECIFIED TYPE (HCC): ICD-10-CM

## 2019-08-23 DIAGNOSIS — Z79.01 CHRONIC ANTICOAGULATION: ICD-10-CM

## 2019-08-23 LAB — INTERNATIONAL NORMALIZATION RATIO, POC: 1.7

## 2019-08-23 PROCEDURE — 99211 OFF/OP EST MAY X REQ PHY/QHP: CPT

## 2019-08-23 PROCEDURE — 85610 PROTHROMBIN TIME: CPT

## 2019-08-23 NOTE — PROGRESS NOTES
Ms. Mary Carlson is a 66 y.o. y/o female with history of Afib, CVA in 2008   She presents today for anticoagulation monitoring and adjustment. Pertinent PMH: CHF, ICD defibrillator/ pacemaker. 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 Patient Reported Findings:  Yes     No  [x]   [x]       Patient verifies current dosing regimen as listed    []   [x]       S/S bleeding/bruising/swelling/SOB  []   [x]       Blood in urine or stool  []   [x]       Procedures scheduled in the future at this time.  []   [x]       Missed Dose  []   [x]       Extra Dose   [x]   []       Change in medications  effexor increased   []   [x]       Change in health/diet/appetite. She does not eat many vegetables generally. Has lost about 30lbs by eliminating fast food and soft drinks. --> reports about 1-2 servings of greens per week  []   [x]       Change in alcohol use Does not drink alcohol  []   [x]       Change in activity  []   [x]       Hospital admission  []   [x]       Emergency department visit  [x]   []       Other complaints Has increased R hand tremors and L leg is somewhat less responsive. She reports that Dr. Harinder Riggs does not want to give her any additional medications d/t her condition and current medication regimen. Clinical Outcomes:  Yes     No  []   [x]       Major bleeding event  []   [x]       Thromboembolic event    Duration of warfarin Therapy: indefinite  INR Range:  2.0-3.0    INR is 1.7 today despite denying any significant changes   Since has been therapeutic at this weekly dose, take 2.5 mg tonight and tomorrow then continue weekly dose of 2.5 mg on Mon & Fri and 1.25 mg all other days  Encouraged to maintain a consistency of vegetables/salads.   Recheck INR in 4 weeks on 9/20    Referring cardiologist is Dr. Toi Barbour  INR (no units)   Date Value   08/23/2019 1.7   07/19/2019 2.4   06/14/2019 2.9   05/10/2019 2.1   09/24/2018 3.20 (H) 09/18/2018 2.8   08/29/2018 2.1   07/30/2018 1.9

## 2019-09-09 RX ORDER — VENLAFAXINE 37.5 MG/1
TABLET ORAL
Qty: 90 TABLET | Refills: 4 | Status: SHIPPED | OUTPATIENT
Start: 2019-09-09

## 2019-09-24 ENCOUNTER — TELEPHONE (OUTPATIENT)
Dept: PHARMACY | Age: 74
End: 2019-09-24

## 2019-09-25 ENCOUNTER — ANTI-COAG VISIT (OUTPATIENT)
Dept: PHARMACY | Age: 74
End: 2019-09-25
Payer: MEDICARE

## 2019-09-25 DIAGNOSIS — Z79.01 CHRONIC ANTICOAGULATION: ICD-10-CM

## 2019-09-25 DIAGNOSIS — I63.9 CEREBRAL INFARCTION, UNSPECIFIED MECHANISM (HCC): ICD-10-CM

## 2019-09-25 DIAGNOSIS — I48.91 ATRIAL FIBRILLATION, UNSPECIFIED TYPE (HCC): ICD-10-CM

## 2019-09-25 LAB — INTERNATIONAL NORMALIZATION RATIO, POC: 2.1

## 2019-09-25 PROCEDURE — 99211 OFF/OP EST MAY X REQ PHY/QHP: CPT

## 2019-09-25 PROCEDURE — 85610 PROTHROMBIN TIME: CPT

## 2019-10-07 ENCOUNTER — OFFICE VISIT (OUTPATIENT)
Dept: NEUROLOGY | Age: 74
End: 2019-10-07
Payer: MEDICARE

## 2019-10-07 VITALS
HEART RATE: 66 BPM | DIASTOLIC BLOOD PRESSURE: 64 MMHG | SYSTOLIC BLOOD PRESSURE: 108 MMHG | WEIGHT: 166 LBS | BODY MASS INDEX: 26 KG/M2

## 2019-10-07 DIAGNOSIS — G47.33 OBSTRUCTIVE SLEEP APNEA: ICD-10-CM

## 2019-10-07 DIAGNOSIS — G40.209 PARTIAL SYMPTOMATIC EPILEPSY WITH COMPLEX PARTIAL SEIZURES, NOT INTRACTABLE, WITHOUT STATUS EPILEPTICUS (HCC): Primary | ICD-10-CM

## 2019-10-07 DIAGNOSIS — I69.354 HEMIPARESIS AFFECTING LEFT SIDE AS LATE EFFECT OF CEREBROVASCULAR ACCIDENT (HCC): ICD-10-CM

## 2019-10-07 DIAGNOSIS — G20 PARKINSONIAN TREMOR (HCC): ICD-10-CM

## 2019-10-07 PROCEDURE — 99214 OFFICE O/P EST MOD 30 MIN: CPT | Performed by: PSYCHIATRY & NEUROLOGY

## 2019-10-07 RX ORDER — DIVALPROEX SODIUM 500 MG/1
TABLET, EXTENDED RELEASE ORAL
Qty: 90 TABLET | Refills: 1 | Status: SHIPPED | OUTPATIENT
Start: 2019-10-07 | End: 2020-01-01 | Stop reason: SDUPTHER

## 2019-10-22 ENCOUNTER — NURSE ONLY (OUTPATIENT)
Dept: CARDIOLOGY CLINIC | Age: 74
End: 2019-10-22
Payer: MEDICARE

## 2019-10-22 DIAGNOSIS — Z95.810 AUTOMATIC IMPLANTABLE CARDIOVERTER-DEFIBRILLATOR IN SITU: ICD-10-CM

## 2019-10-22 DIAGNOSIS — I50.22 SYSTOLIC CHF, CHRONIC (HCC): Chronic | ICD-10-CM

## 2019-10-22 DIAGNOSIS — I42.9 CARDIOMYOPATHY, UNSPECIFIED TYPE (HCC): Chronic | ICD-10-CM

## 2019-10-22 PROCEDURE — 93296 REM INTERROG EVL PM/IDS: CPT | Performed by: INTERNAL MEDICINE

## 2019-10-22 PROCEDURE — 93297 REM INTERROG DEV EVAL ICPMS: CPT | Performed by: INTERNAL MEDICINE

## 2019-10-22 PROCEDURE — 93295 DEV INTERROG REMOTE 1/2/MLT: CPT | Performed by: INTERNAL MEDICINE

## 2019-10-23 ENCOUNTER — ANTI-COAG VISIT (OUTPATIENT)
Dept: PHARMACY | Age: 74
End: 2019-10-23
Payer: MEDICARE

## 2019-10-23 DIAGNOSIS — Z79.01 CHRONIC ANTICOAGULATION: ICD-10-CM

## 2019-10-23 DIAGNOSIS — I48.91 ATRIAL FIBRILLATION, UNSPECIFIED TYPE (HCC): ICD-10-CM

## 2019-10-23 LAB — INTERNATIONAL NORMALIZATION RATIO, POC: 2.1

## 2019-10-23 PROCEDURE — 99211 OFF/OP EST MAY X REQ PHY/QHP: CPT

## 2019-10-23 PROCEDURE — 85610 PROTHROMBIN TIME: CPT

## 2020-01-01 ENCOUNTER — HOSPITAL ENCOUNTER (OUTPATIENT)
Age: 75
Setting detail: SPECIMEN
Discharge: HOME | DRG: 948 | End: 2020-09-30
Payer: MEDICARE

## 2020-01-01 ENCOUNTER — TELEPHONE (OUTPATIENT)
Dept: PHARMACY | Age: 75
End: 2020-01-01

## 2020-01-01 ENCOUNTER — ANTI-COAG VISIT (OUTPATIENT)
Dept: PHARMACY | Age: 75
End: 2020-01-01

## 2020-01-01 ENCOUNTER — ANTI-COAG VISIT (OUTPATIENT)
Dept: PHARMACY | Age: 75
End: 2020-01-01
Payer: MEDICARE

## 2020-01-01 ENCOUNTER — TELEPHONE (OUTPATIENT)
Dept: CARDIOLOGY CLINIC | Age: 75
End: 2020-01-01

## 2020-01-01 ENCOUNTER — OFFICE VISIT (OUTPATIENT)
Dept: CARDIOLOGY CLINIC | Age: 75
End: 2020-01-01
Payer: MEDICARE

## 2020-01-01 ENCOUNTER — NURSE ONLY (OUTPATIENT)
Dept: CARDIOLOGY CLINIC | Age: 75
End: 2020-01-01
Payer: MEDICARE

## 2020-01-01 ENCOUNTER — ANTI-COAG VISIT (OUTPATIENT)
Dept: CARDIOLOGY CLINIC | Age: 75
End: 2020-01-01

## 2020-01-01 ENCOUNTER — APPOINTMENT (OUTPATIENT)
Dept: CT IMAGING | Age: 75
DRG: 948 | End: 2020-01-01
Payer: MEDICARE

## 2020-01-01 ENCOUNTER — OFFICE VISIT (OUTPATIENT)
Dept: NEUROLOGY | Age: 75
End: 2020-01-01
Payer: MEDICARE

## 2020-01-01 ENCOUNTER — OFFICE VISIT (OUTPATIENT)
Dept: PULMONOLOGY | Age: 75
End: 2020-01-01
Payer: MEDICARE

## 2020-01-01 ENCOUNTER — APPOINTMENT (OUTPATIENT)
Dept: GENERAL RADIOLOGY | Age: 75
DRG: 948 | End: 2020-01-01
Payer: MEDICARE

## 2020-01-01 ENCOUNTER — TELEPHONE (OUTPATIENT)
Dept: OTHER | Age: 75
End: 2020-01-01

## 2020-01-01 ENCOUNTER — HOSPITAL ENCOUNTER (INPATIENT)
Age: 75
LOS: 7 days | Discharge: NURSING, REHAB OR LONG-TERM CARE | DRG: 948 | End: 2020-10-08
Attending: EMERGENCY MEDICINE | Admitting: INTERNAL MEDICINE
Payer: MEDICARE

## 2020-01-01 ENCOUNTER — HOSPITAL ENCOUNTER (OUTPATIENT)
Age: 75
Discharge: HOME | End: 2020-05-05
Payer: MEDICARE

## 2020-01-01 VITALS
HEIGHT: 67 IN | BODY MASS INDEX: 26.21 KG/M2 | HEART RATE: 71 BPM | WEIGHT: 167 LBS | DIASTOLIC BLOOD PRESSURE: 74 MMHG | OXYGEN SATURATION: 98 % | SYSTOLIC BLOOD PRESSURE: 118 MMHG

## 2020-01-01 VITALS
HEART RATE: 93 BPM | DIASTOLIC BLOOD PRESSURE: 66 MMHG | OXYGEN SATURATION: 94 % | WEIGHT: 177.69 LBS | BODY MASS INDEX: 27.89 KG/M2 | RESPIRATION RATE: 18 BRPM | HEIGHT: 67 IN | TEMPERATURE: 97.3 F | SYSTOLIC BLOOD PRESSURE: 98 MMHG

## 2020-01-01 VITALS
SYSTOLIC BLOOD PRESSURE: 94 MMHG | WEIGHT: 173 LBS | OXYGEN SATURATION: 95 % | DIASTOLIC BLOOD PRESSURE: 60 MMHG | BODY MASS INDEX: 27.1 KG/M2 | HEART RATE: 89 BPM

## 2020-01-01 VITALS — HEIGHT: 67 IN | WEIGHT: 170 LBS | BODY MASS INDEX: 26.68 KG/M2

## 2020-01-01 LAB
A/G RATIO: 1.1 (ref 1.1–2.2)
A/G RATIO: 1.4 (ref 1.1–2.2)
ABO/RH: NORMAL
ALBUMIN SERPL-MCNC: 3 G/DL (ref 3.4–5)
ALBUMIN SERPL-MCNC: 4.2 G/DL (ref 3.4–5)
ALP BLD-CCNC: 178 U/L (ref 40–129)
ALP BLD-CCNC: 59 U/L (ref 40–129)
ALT SERPL-CCNC: 18 U/L (ref 10–40)
ALT SERPL-CCNC: 23 U/L (ref 10–40)
ANION GAP SERPL CALCULATED.3IONS-SCNC: 10 MMOL/L (ref 3–16)
ANION GAP SERPL CALCULATED.3IONS-SCNC: 10 MMOL/L (ref 3–16)
ANION GAP SERPL CALCULATED.3IONS-SCNC: 14 MMOL/L (ref 3–16)
ANION GAP SERPL CALCULATED.3IONS-SCNC: 14 MMOL/L (ref 3–16)
ANION GAP SERPL CALCULATED.3IONS-SCNC: 7 MMOL/L (ref 3–16)
ANION GAP SERPL CALCULATED.3IONS-SCNC: 7 MMOL/L (ref 3–16)
ANION GAP SERPL CALCULATED.3IONS-SCNC: 8 MMOL/L (ref 3–16)
ANION GAP SERPL CALCULATED.3IONS-SCNC: 9 MMOL/L (ref 3–16)
ANTIBODY SCREEN: NORMAL
AST SERPL-CCNC: 21 U/L (ref 15–37)
AST SERPL-CCNC: 42 U/L (ref 15–37)
ATYPICAL LYMPHOCYTE RELATIVE PERCENT: 1 % (ref 0–6)
BANDED NEUTROPHILS RELATIVE PERCENT: 2 % (ref 0–7)
BASOPHILS ABSOLUTE: 0 K/UL (ref 0–0.2)
BASOPHILS ABSOLUTE: 0.1 K/UL (ref 0–0.2)
BASOPHILS ABSOLUTE: 0.1 K/UL (ref 0–0.2)
BASOPHILS RELATIVE PERCENT: 0 %
BASOPHILS RELATIVE PERCENT: 0.3 %
BASOPHILS RELATIVE PERCENT: 0.3 %
BASOPHILS RELATIVE PERCENT: 0.6 %
BASOPHILS RELATIVE PERCENT: 0.8 %
BILIRUB SERPL-MCNC: 0.4 MG/DL (ref 0–1)
BILIRUB SERPL-MCNC: 0.5 MG/DL (ref 0–1)
BILIRUBIN URINE: ABNORMAL
BLOOD, URINE: ABNORMAL
BUN BLDV-MCNC: 10 MG/DL (ref 7–20)
BUN BLDV-MCNC: 11 MG/DL (ref 7–20)
BUN BLDV-MCNC: 12 MG/DL (ref 7–20)
BUN BLDV-MCNC: 15 MG/DL (ref 7–20)
BUN BLDV-MCNC: 17 MG/DL (ref 7–20)
BUN BLDV-MCNC: 19 MG/DL (ref 7–20)
BUN BLDV-MCNC: 8 MG/DL (ref 7–20)
BUN BLDV-MCNC: 9 MG/DL (ref 7–20)
CALCIUM SERPL-MCNC: 10 MG/DL (ref 8.3–10.6)
CALCIUM SERPL-MCNC: 8.9 MG/DL (ref 8.3–10.6)
CALCIUM SERPL-MCNC: 8.9 MG/DL (ref 8.3–10.6)
CALCIUM SERPL-MCNC: 9 MG/DL (ref 8.3–10.6)
CALCIUM SERPL-MCNC: 9.1 MG/DL (ref 8.3–10.6)
CALCIUM SERPL-MCNC: 9.6 MG/DL (ref 8.3–10.6)
CALCIUM SERPL-MCNC: 9.7 MG/DL (ref 8.3–10.6)
CALCIUM SERPL-MCNC: 9.9 MG/DL (ref 8.3–10.6)
CHLORIDE BLD-SCNC: 100 MMOL/L (ref 99–110)
CHLORIDE BLD-SCNC: 101 MMOL/L (ref 99–110)
CHLORIDE BLD-SCNC: 101 MMOL/L (ref 99–110)
CHLORIDE BLD-SCNC: 102 MMOL/L (ref 99–110)
CHLORIDE BLD-SCNC: 106 MMOL/L (ref 99–110)
CHLORIDE BLD-SCNC: 95 MMOL/L (ref 99–110)
CHLORIDE BLD-SCNC: 95 MMOL/L (ref 99–110)
CHLORIDE BLD-SCNC: 97 MMOL/L (ref 99–110)
CLARITY: ABNORMAL
CO2: 18 MMOL/L (ref 21–32)
CO2: 23 MMOL/L (ref 21–32)
CO2: 23 MMOL/L (ref 21–32)
CO2: 25 MMOL/L (ref 21–32)
CO2: 27 MMOL/L (ref 21–32)
CO2: 29 MMOL/L (ref 21–32)
CO2: 29 MMOL/L (ref 21–32)
CO2: 32 MMOL/L (ref 21–32)
COLOR: YELLOW
COMMENT UA: ABNORMAL
CREAT SERPL-MCNC: 0.5 MG/DL (ref 0.6–1.2)
CREAT SERPL-MCNC: 0.6 MG/DL (ref 0.6–1.2)
CREAT SERPL-MCNC: 0.6 MG/DL (ref 0.6–1.2)
CREAT SERPL-MCNC: 0.7 MG/DL (ref 0.6–1.2)
CREAT SERPL-MCNC: 0.9 MG/DL (ref 0.6–1.2)
CREAT SERPL-MCNC: <0.5 MG/DL (ref 0.6–1.2)
CRYSTALS, UA: ABNORMAL /HPF
EKG ATRIAL RATE: 90 BPM
EKG DIAGNOSIS: NORMAL
EKG P AXIS: 32 DEGREES
EKG P-R INTERVAL: 136 MS
EKG Q-T INTERVAL: 424 MS
EKG QRS DURATION: 154 MS
EKG QTC CALCULATION (BAZETT): 518 MS
EKG R AXIS: -71 DEGREES
EKG T AXIS: -50 DEGREES
EKG VENTRICULAR RATE: 90 BPM
EOSINOPHILS ABSOLUTE: 0 K/UL (ref 0–0.6)
EOSINOPHILS ABSOLUTE: 0.1 K/UL (ref 0–0.6)
EOSINOPHILS ABSOLUTE: 0.1 K/UL (ref 0–0.6)
EOSINOPHILS ABSOLUTE: 0.2 K/UL (ref 0–0.6)
EOSINOPHILS ABSOLUTE: 0.2 K/UL (ref 0–0.6)
EOSINOPHILS ABSOLUTE: 0.3 K/UL (ref 0–0.6)
EOSINOPHILS ABSOLUTE: 0.3 K/UL (ref 0–0.6)
EOSINOPHILS RELATIVE PERCENT: 0 %
EOSINOPHILS RELATIVE PERCENT: 1 %
EOSINOPHILS RELATIVE PERCENT: 1 %
EOSINOPHILS RELATIVE PERCENT: 2.1 %
EOSINOPHILS RELATIVE PERCENT: 2.1 %
EOSINOPHILS RELATIVE PERCENT: 2.2 %
EOSINOPHILS RELATIVE PERCENT: 2.4 %
EPITHELIAL CELLS, UA: 1 /HPF (ref 0–5)
GFR AFRICAN AMERICAN: >60
GFR NON-AFRICAN AMERICAN: >60
GLOBULIN: 2.8 G/DL
GLOBULIN: 3.1 G/DL
GLUCOSE BLD-MCNC: 102 MG/DL (ref 70–99)
GLUCOSE BLD-MCNC: 106 MG/DL (ref 70–99)
GLUCOSE BLD-MCNC: 107 MG/DL (ref 70–99)
GLUCOSE BLD-MCNC: 108 MG/DL (ref 70–99)
GLUCOSE BLD-MCNC: 109 MG/DL (ref 70–99)
GLUCOSE BLD-MCNC: 118 MG/DL (ref 70–99)
GLUCOSE BLD-MCNC: 94 MG/DL (ref 70–99)
GLUCOSE BLD-MCNC: 99 MG/DL (ref 70–99)
GLUCOSE URINE: NEGATIVE MG/DL
HCT VFR BLD CALC: 39.8 % (ref 36–48)
HCT VFR BLD CALC: 41.1 % (ref 36–48)
HCT VFR BLD CALC: 41.5 % (ref 36–48)
HCT VFR BLD CALC: 41.7 % (ref 36–48)
HCT VFR BLD CALC: 41.8 % (ref 36–48)
HCT VFR BLD CALC: 42.2 % (ref 36–48)
HCT VFR BLD CALC: 44.1 % (ref 36–48)
HCT VFR BLD CALC: 45.7 % (ref 36–48)
HEMATOLOGY PATH CONSULT: NO
HEMATOLOGY PATH CONSULT: NORMAL
HEMATOLOGY PATH CONSULT: YES
HEMOGLOBIN: 13.5 G/DL (ref 12–16)
HEMOGLOBIN: 13.9 G/DL (ref 12–16)
HEMOGLOBIN: 14 G/DL (ref 12–16)
HEMOGLOBIN: 14.1 G/DL (ref 12–16)
HEMOGLOBIN: 15.1 G/DL (ref 12–16)
HEMOGLOBIN: 15.1 G/DL (ref 12–16)
HYALINE CASTS: 3 /LPF (ref 0–8)
INR BLD: 1.17 (ref 0.86–1.14)
INR BLD: 1.18 (ref 0.86–1.14)
INR BLD: 1.27 (ref 0.86–1.14)
INR BLD: 1.42 (ref 0.86–1.14)
INR BLD: 1.5
INR BLD: 1.6
INR BLD: 1.76 (ref 0.86–1.14)
INR BLD: 1.8
INR BLD: 1.9
INR BLD: 10.22 (ref 0.86–1.14)
INR BLD: 12.36 (ref 0.86–1.14)
INR BLD: 13.3
INR BLD: 13.3 (ref 0.86–1.14)
INR BLD: 2.06 (ref 0.86–1.14)
INR BLD: 2.3
INR BLD: 2.6
INR BLD: 2.6
INR BLD: 2.72 (ref 0.86–1.14)
INR BLD: 2.72 (ref 0.86–1.14)
INR BLD: 2.9
INR BLD: 3
INR BLD: 3
INR BLD: 3.1
INR BLD: 4
INTERNATIONAL NORMALIZATION RATIO, POC: 1.3
INTERNATIONAL NORMALIZATION RATIO, POC: 1.6
INTERNATIONAL NORMALIZATION RATIO, POC: 1.7
INTERNATIONAL NORMALIZATION RATIO, POC: 2.1
INTERNATIONAL NORMALIZATION RATIO, POC: 2.7
INTERNATIONAL NORMALIZATION RATIO, POC: 3.2
INTERNATIONAL NORMALIZATION RATIO, POC: 3.3
KETONES, URINE: 15 MG/DL
LEUKOCYTE ESTERASE, URINE: NEGATIVE
LYMPHOCYTES ABSOLUTE: 0.7 K/UL (ref 1–5.1)
LYMPHOCYTES ABSOLUTE: 2.4 K/UL (ref 1–5.1)
LYMPHOCYTES ABSOLUTE: 2.4 K/UL (ref 1–5.1)
LYMPHOCYTES ABSOLUTE: 2.8 K/UL (ref 1–5.1)
LYMPHOCYTES ABSOLUTE: 3.1 K/UL (ref 1–5.1)
LYMPHOCYTES ABSOLUTE: 3.1 K/UL (ref 1–5.1)
LYMPHOCYTES ABSOLUTE: 3.3 K/UL (ref 1–5.1)
LYMPHOCYTES RELATIVE PERCENT: 20 %
LYMPHOCYTES RELATIVE PERCENT: 21.3 %
LYMPHOCYTES RELATIVE PERCENT: 22 %
LYMPHOCYTES RELATIVE PERCENT: 26.9 %
LYMPHOCYTES RELATIVE PERCENT: 27 %
LYMPHOCYTES RELATIVE PERCENT: 30.5 %
LYMPHOCYTES RELATIVE PERCENT: 6 %
MCH RBC QN AUTO: 32.2 PG (ref 26–34)
MCH RBC QN AUTO: 32.4 PG (ref 26–34)
MCH RBC QN AUTO: 32.6 PG (ref 26–34)
MCH RBC QN AUTO: 32.9 PG (ref 26–34)
MCH RBC QN AUTO: 33.1 PG (ref 26–34)
MCH RBC QN AUTO: 33.1 PG (ref 26–34)
MCH RBC QN AUTO: 33.3 PG (ref 26–34)
MCH RBC QN AUTO: 34 PG (ref 26–34)
MCHC RBC AUTO-ENTMCNC: 33 G/DL (ref 31–36)
MCHC RBC AUTO-ENTMCNC: 33.1 G/DL (ref 31–36)
MCHC RBC AUTO-ENTMCNC: 33.4 G/DL (ref 31–36)
MCHC RBC AUTO-ENTMCNC: 33.6 G/DL (ref 31–36)
MCHC RBC AUTO-ENTMCNC: 33.8 G/DL (ref 31–36)
MCHC RBC AUTO-ENTMCNC: 33.9 G/DL (ref 31–36)
MCHC RBC AUTO-ENTMCNC: 33.9 G/DL (ref 31–36)
MCHC RBC AUTO-ENTMCNC: 34.2 G/DL (ref 31–36)
MCV RBC AUTO: 97.2 FL (ref 80–100)
MCV RBC AUTO: 97.3 FL (ref 80–100)
MCV RBC AUTO: 97.6 FL (ref 80–100)
MCV RBC AUTO: 97.9 FL (ref 80–100)
MCV RBC AUTO: 98.1 FL (ref 80–100)
MCV RBC AUTO: 98.3 FL (ref 80–100)
MCV RBC AUTO: 98.4 FL (ref 80–100)
MCV RBC AUTO: 99.5 FL (ref 80–100)
MICROSCOPIC EXAMINATION: YES
MONOCYTES ABSOLUTE: 1.2 K/UL (ref 0–1.3)
MONOCYTES ABSOLUTE: 1.6 K/UL (ref 0–1.3)
MONOCYTES ABSOLUTE: 1.6 K/UL (ref 0–1.3)
MONOCYTES ABSOLUTE: 1.7 K/UL (ref 0–1.3)
MONOCYTES ABSOLUTE: 1.8 K/UL (ref 0–1.3)
MONOCYTES ABSOLUTE: 2 K/UL (ref 0–1.3)
MONOCYTES ABSOLUTE: 2.2 K/UL (ref 0–1.3)
MONOCYTES RELATIVE PERCENT: 15 %
MONOCYTES RELATIVE PERCENT: 15.3 %
MONOCYTES RELATIVE PERCENT: 15.3 %
MONOCYTES RELATIVE PERCENT: 15.4 %
MONOCYTES RELATIVE PERCENT: 16.2 %
MONOCYTES RELATIVE PERCENT: 19 %
MONOCYTES RELATIVE PERCENT: 9 %
NEUTROPHILS ABSOLUTE: 5.3 K/UL (ref 1.7–7.7)
NEUTROPHILS ABSOLUTE: 6.4 K/UL (ref 1.7–7.7)
NEUTROPHILS ABSOLUTE: 6.5 K/UL (ref 1.7–7.7)
NEUTROPHILS ABSOLUTE: 6.8 K/UL (ref 1.7–7.7)
NEUTROPHILS ABSOLUTE: 6.9 K/UL (ref 1.7–7.7)
NEUTROPHILS ABSOLUTE: 8.7 K/UL (ref 1.7–7.7)
NEUTROPHILS ABSOLUTE: 9.2 K/UL (ref 1.7–7.7)
NEUTROPHILS RELATIVE PERCENT: 51.8 %
NEUTROPHILS RELATIVE PERCENT: 53.6 %
NEUTROPHILS RELATIVE PERCENT: 55 %
NEUTROPHILS RELATIVE PERCENT: 60.9 %
NEUTROPHILS RELATIVE PERCENT: 61 %
NEUTROPHILS RELATIVE PERCENT: 69 %
NEUTROPHILS RELATIVE PERCENT: 74 %
NITRITE, URINE: NEGATIVE
PDW BLD-RTO: 13.2 % (ref 12.4–15.4)
PDW BLD-RTO: 13.4 % (ref 12.4–15.4)
PDW BLD-RTO: 13.6 % (ref 12.4–15.4)
PDW BLD-RTO: 13.7 % (ref 12.4–15.4)
PDW BLD-RTO: 13.8 % (ref 12.4–15.4)
PDW BLD-RTO: 14 % (ref 12.4–15.4)
PH UA: 5.5 (ref 5–8)
PLATELET # BLD: 151 K/UL (ref 135–450)
PLATELET # BLD: 157 K/UL (ref 135–450)
PLATELET # BLD: 160 K/UL (ref 135–450)
PLATELET # BLD: 183 K/UL (ref 135–450)
PLATELET # BLD: 186 K/UL (ref 135–450)
PLATELET # BLD: 194 K/UL (ref 135–450)
PLATELET # BLD: 205 K/UL (ref 135–450)
PLATELET # BLD: 282 K/UL (ref 135–450)
PLATELET SLIDE REVIEW: ADEQUATE
PMV BLD AUTO: 6.9 FL (ref 5–10.5)
PMV BLD AUTO: 7 FL (ref 5–10.5)
PMV BLD AUTO: 7.2 FL (ref 5–10.5)
PMV BLD AUTO: 7.3 FL (ref 5–10.5)
PMV BLD AUTO: 7.4 FL (ref 5–10.5)
PMV BLD AUTO: 8 FL (ref 5–10.5)
POLYCHROMASIA: ABNORMAL
POLYCHROMASIA: ABNORMAL
POTASSIUM REFLEX MAGNESIUM: 3.9 MMOL/L (ref 3.5–5.1)
POTASSIUM REFLEX MAGNESIUM: 4.4 MMOL/L (ref 3.5–5.1)
POTASSIUM SERPL-SCNC: 3.8 MMOL/L (ref 3.5–5.1)
POTASSIUM SERPL-SCNC: 3.8 MMOL/L (ref 3.5–5.1)
POTASSIUM SERPL-SCNC: 3.9 MMOL/L (ref 3.5–5.1)
POTASSIUM SERPL-SCNC: 4.1 MMOL/L (ref 3.5–5.1)
POTASSIUM SERPL-SCNC: 4.1 MMOL/L (ref 3.5–5.1)
POTASSIUM SERPL-SCNC: 4.2 MMOL/L (ref 3.5–5.1)
POTASSIUM SERPL-SCNC: 4.9 MMOL/L (ref 3.5–5.1)
PRO-BNP: 1894 PG/ML (ref 0–449)
PROTEIN UA: ABNORMAL MG/DL
PROTHROMBIN TIME: 121.4 SEC (ref 10–13.2)
PROTHROMBIN TIME: 13.6 SEC (ref 10–13.2)
PROTHROMBIN TIME: 13.7 SEC (ref 10–13.2)
PROTHROMBIN TIME: 14.8 SEC (ref 10–13.2)
PROTHROMBIN TIME: 147.1 SEC (ref 10–13.2)
PROTHROMBIN TIME: 158.4 SEC (ref 10–13.2)
PROTHROMBIN TIME: 16.5 SEC (ref 10–13.2)
PROTHROMBIN TIME: 20.5 SEC (ref 10–13.2)
PROTHROMBIN TIME: 24.1 SEC (ref 10–13.2)
PROTHROMBIN TIME: 31.9 SEC (ref 10–13.2)
PROTHROMBIN TIME: 31.9 SEC (ref 10–13.2)
RBC # BLD: 4.09 M/UL (ref 4–5.2)
RBC # BLD: 4.18 M/UL (ref 4–5.2)
RBC # BLD: 4.22 M/UL (ref 4–5.2)
RBC # BLD: 4.27 M/UL (ref 4–5.2)
RBC # BLD: 4.27 M/UL (ref 4–5.2)
RBC # BLD: 4.34 M/UL (ref 4–5.2)
RBC # BLD: 4.43 M/UL (ref 4–5.2)
RBC # BLD: 4.66 M/UL (ref 4–5.2)
RBC UA: ABNORMAL /HPF (ref 0–4)
SARS-COV-2, NAA: NOT DETECTED
SLIDE REVIEW: ABNORMAL
SMUDGE CELLS: PRESENT
SMUDGE CELLS: PRESENT
SODIUM BLD-SCNC: 133 MMOL/L (ref 136–145)
SODIUM BLD-SCNC: 133 MMOL/L (ref 136–145)
SODIUM BLD-SCNC: 134 MMOL/L (ref 136–145)
SODIUM BLD-SCNC: 134 MMOL/L (ref 136–145)
SODIUM BLD-SCNC: 135 MMOL/L (ref 136–145)
SODIUM BLD-SCNC: 136 MMOL/L (ref 136–145)
SODIUM BLD-SCNC: 138 MMOL/L (ref 136–145)
SODIUM BLD-SCNC: 139 MMOL/L (ref 136–145)
SPECIFIC GRAVITY UA: >=1.03 (ref 1–1.03)
TOTAL PROTEIN: 5.8 G/DL (ref 6.4–8.2)
TOTAL PROTEIN: 7.3 G/DL (ref 6.4–8.2)
TROPONIN: <0.01 NG/ML
URINE REFLEX TO CULTURE: ABNORMAL
URINE TYPE: ABNORMAL
UROBILINOGEN, URINE: 0.2 E.U./DL
WBC # BLD: 10.2 K/UL (ref 4–11)
WBC # BLD: 10.8 K/UL (ref 4–11)
WBC # BLD: 11.3 K/UL (ref 4–11)
WBC # BLD: 11.6 K/UL (ref 4–11)
WBC # BLD: 11.7 K/UL (ref 4–11)
WBC # BLD: 12.2 K/UL (ref 4–11)
WBC # BLD: 13.3 K/UL (ref 4–11)
WBC # BLD: 7.3 K/UL (ref 4–11)
WBC UA: 4 /HPF (ref 0–5)

## 2020-01-01 PROCEDURE — 1200000000 HC SEMI PRIVATE

## 2020-01-01 PROCEDURE — 93284 PRGRMG EVAL IMPLANTABLE DFB: CPT | Performed by: INTERNAL MEDICINE

## 2020-01-01 PROCEDURE — 36415 COLL VENOUS BLD VENIPUNCTURE: CPT

## 2020-01-01 PROCEDURE — 6370000000 HC RX 637 (ALT 250 FOR IP): Performed by: INTERNAL MEDICINE

## 2020-01-01 PROCEDURE — 84484 ASSAY OF TROPONIN QUANT: CPT

## 2020-01-01 PROCEDURE — 99211 OFF/OP EST MAY X REQ PHY/QHP: CPT

## 2020-01-01 PROCEDURE — 80048 BASIC METABOLIC PNL TOTAL CA: CPT

## 2020-01-01 PROCEDURE — 93296 REM INTERROG EVL PM/IDS: CPT | Performed by: INTERNAL MEDICINE

## 2020-01-01 PROCEDURE — 85610 PROTHROMBIN TIME: CPT

## 2020-01-01 PROCEDURE — 85025 COMPLETE CBC W/AUTO DIFF WBC: CPT

## 2020-01-01 PROCEDURE — 6360000002 HC RX W HCPCS: Performed by: INTERNAL MEDICINE

## 2020-01-01 PROCEDURE — 93290 INTERROG DEV EVAL ICPMS IP: CPT | Performed by: INTERNAL MEDICINE

## 2020-01-01 PROCEDURE — 2580000003 HC RX 258: Performed by: INTERNAL MEDICINE

## 2020-01-01 PROCEDURE — 93295 DEV INTERROG REMOTE 1/2/MLT: CPT | Performed by: INTERNAL MEDICINE

## 2020-01-01 PROCEDURE — 97530 THERAPEUTIC ACTIVITIES: CPT

## 2020-01-01 PROCEDURE — 86900 BLOOD TYPING SEROLOGIC ABO: CPT

## 2020-01-01 PROCEDURE — 99212 OFFICE O/P EST SF 10 MIN: CPT

## 2020-01-01 PROCEDURE — 97110 THERAPEUTIC EXERCISES: CPT

## 2020-01-01 PROCEDURE — 81001 URINALYSIS AUTO W/SCOPE: CPT

## 2020-01-01 PROCEDURE — 97535 SELF CARE MNGMENT TRAINING: CPT

## 2020-01-01 PROCEDURE — 6370000000 HC RX 637 (ALT 250 FOR IP): Performed by: EMERGENCY MEDICINE

## 2020-01-01 PROCEDURE — 99214 OFFICE O/P EST MOD 30 MIN: CPT | Performed by: INTERNAL MEDICINE

## 2020-01-01 PROCEDURE — 85027 COMPLETE CBC AUTOMATED: CPT

## 2020-01-01 PROCEDURE — 86850 RBC ANTIBODY SCREEN: CPT

## 2020-01-01 PROCEDURE — 97116 GAIT TRAINING THERAPY: CPT

## 2020-01-01 PROCEDURE — 86901 BLOOD TYPING SEROLOGIC RH(D): CPT

## 2020-01-01 PROCEDURE — 97162 PT EVAL MOD COMPLEX 30 MIN: CPT

## 2020-01-01 PROCEDURE — U0003 INFECTIOUS AGENT DETECTION BY NUCLEIC ACID (DNA OR RNA); SEVERE ACUTE RESPIRATORY SYNDROME CORONAVIRUS 2 (SARS-COV-2) (CORONAVIRUS DISEASE [COVID-19]), AMPLIFIED PROBE TECHNIQUE, MAKING USE OF HIGH THROUGHPUT TECHNOLOGIES AS DESCRIBED BY CMS-2020-01-R: HCPCS

## 2020-01-01 PROCEDURE — 99214 OFFICE O/P EST MOD 30 MIN: CPT | Performed by: NURSE PRACTITIONER

## 2020-01-01 PROCEDURE — 80053 COMPREHEN METABOLIC PANEL: CPT

## 2020-01-01 PROCEDURE — 93010 ELECTROCARDIOGRAM REPORT: CPT | Performed by: INTERNAL MEDICINE

## 2020-01-01 PROCEDURE — 97166 OT EVAL MOD COMPLEX 45 MIN: CPT

## 2020-01-01 PROCEDURE — 93005 ELECTROCARDIOGRAM TRACING: CPT | Performed by: EMERGENCY MEDICINE

## 2020-01-01 PROCEDURE — 71045 X-RAY EXAM CHEST 1 VIEW: CPT

## 2020-01-01 PROCEDURE — 72125 CT NECK SPINE W/O DYE: CPT

## 2020-01-01 PROCEDURE — 70450 CT HEAD/BRAIN W/O DYE: CPT

## 2020-01-01 PROCEDURE — 83880 ASSAY OF NATRIURETIC PEPTIDE: CPT

## 2020-01-01 PROCEDURE — 99285 EMERGENCY DEPT VISIT HI MDM: CPT

## 2020-01-01 PROCEDURE — 99213 OFFICE O/P EST LOW 20 MIN: CPT | Performed by: PSYCHIATRY & NEUROLOGY

## 2020-01-01 RX ORDER — WARFARIN SODIUM 2.5 MG/1
1.25 TABLET ORAL DAILY
Status: DISCONTINUED | OUTPATIENT
Start: 2020-01-01 | End: 2020-01-01 | Stop reason: HOSPADM

## 2020-01-01 RX ORDER — SODIUM CHLORIDE 0.9 % (FLUSH) 0.9 %
10 SYRINGE (ML) INJECTION PRN
Status: DISCONTINUED | OUTPATIENT
Start: 2020-01-01 | End: 2020-01-01 | Stop reason: HOSPADM

## 2020-01-01 RX ORDER — DIVALPROEX SODIUM 500 MG/1
TABLET, EXTENDED RELEASE ORAL
Qty: 90 TABLET | Refills: 1 | Status: SHIPPED | OUTPATIENT
Start: 2020-01-01

## 2020-01-01 RX ORDER — ACETAMINOPHEN 325 MG/1
650 TABLET ORAL EVERY 6 HOURS PRN
Status: DISCONTINUED | OUTPATIENT
Start: 2020-01-01 | End: 2020-01-01 | Stop reason: HOSPADM

## 2020-01-01 RX ORDER — POTASSIUM CHLORIDE 20 MEQ/1
40 TABLET, EXTENDED RELEASE ORAL PRN
Status: DISCONTINUED | OUTPATIENT
Start: 2020-01-01 | End: 2020-01-01 | Stop reason: HOSPADM

## 2020-01-01 RX ORDER — DIVALPROEX SODIUM 500 MG/1
TABLET, EXTENDED RELEASE ORAL
Qty: 90 TABLET | Refills: 1 | Status: SHIPPED | OUTPATIENT
Start: 2020-01-01 | End: 2020-01-01 | Stop reason: SDUPTHER

## 2020-01-01 RX ORDER — HYDROCHLOROTHIAZIDE 25 MG/1
25 TABLET ORAL EVERY MORNING
Status: DISCONTINUED | OUTPATIENT
Start: 2020-01-01 | End: 2020-01-01 | Stop reason: HOSPADM

## 2020-01-01 RX ORDER — WARFARIN SODIUM 2.5 MG/1
2.5 TABLET ORAL DAILY
Status: ON HOLD | COMMUNITY
End: 2020-01-01 | Stop reason: HOSPADM

## 2020-01-01 RX ORDER — POTASSIUM CHLORIDE 7.45 MG/ML
10 INJECTION INTRAVENOUS PRN
Status: DISCONTINUED | OUTPATIENT
Start: 2020-01-01 | End: 2020-01-01 | Stop reason: HOSPADM

## 2020-01-01 RX ORDER — ONDANSETRON 2 MG/ML
4 INJECTION INTRAMUSCULAR; INTRAVENOUS EVERY 6 HOURS PRN
Status: DISCONTINUED | OUTPATIENT
Start: 2020-01-01 | End: 2020-01-01 | Stop reason: HOSPADM

## 2020-01-01 RX ORDER — SIMVASTATIN 10 MG
10 TABLET ORAL NIGHTLY
Status: DISCONTINUED | OUTPATIENT
Start: 2020-01-01 | End: 2020-01-01 | Stop reason: CLARIF

## 2020-01-01 RX ORDER — PROMETHAZINE HYDROCHLORIDE 25 MG/1
12.5 TABLET ORAL EVERY 6 HOURS PRN
Status: DISCONTINUED | OUTPATIENT
Start: 2020-01-01 | End: 2020-01-01 | Stop reason: HOSPADM

## 2020-01-01 RX ORDER — DIGOXIN 125 MCG
125 TABLET ORAL DAILY
Status: DISCONTINUED | OUTPATIENT
Start: 2020-01-01 | End: 2020-01-01 | Stop reason: HOSPADM

## 2020-01-01 RX ORDER — DIVALPROEX SODIUM 250 MG/1
250 TABLET, EXTENDED RELEASE ORAL DAILY
Status: DISCONTINUED | OUTPATIENT
Start: 2020-01-01 | End: 2020-01-01 | Stop reason: HOSPADM

## 2020-01-01 RX ORDER — FAMOTIDINE 20 MG/1
20 TABLET, FILM COATED ORAL 2 TIMES DAILY PRN
Status: DISCONTINUED | OUTPATIENT
Start: 2020-01-01 | End: 2020-01-01 | Stop reason: HOSPADM

## 2020-01-01 RX ORDER — ROSUVASTATIN CALCIUM 10 MG/1
5 TABLET, COATED ORAL NIGHTLY
Status: DISCONTINUED | OUTPATIENT
Start: 2020-01-01 | End: 2020-01-01 | Stop reason: HOSPADM

## 2020-01-01 RX ORDER — ACETAMINOPHEN 650 MG/1
650 SUPPOSITORY RECTAL EVERY 6 HOURS PRN
Status: DISCONTINUED | OUTPATIENT
Start: 2020-01-01 | End: 2020-01-01 | Stop reason: HOSPADM

## 2020-01-01 RX ORDER — SIMVASTATIN 20 MG
TABLET ORAL
Qty: 90 TABLET | Refills: 3 | Status: SHIPPED | OUTPATIENT
Start: 2020-01-01

## 2020-01-01 RX ORDER — WARFARIN SODIUM 5 MG/1
5 TABLET ORAL DAILY
Status: DISCONTINUED | OUTPATIENT
Start: 2020-01-01 | End: 2020-01-01

## 2020-01-01 RX ORDER — 0.9 % SODIUM CHLORIDE 0.9 %
250 INTRAVENOUS SOLUTION INTRAVENOUS ONCE
Status: COMPLETED | OUTPATIENT
Start: 2020-01-01 | End: 2020-01-01

## 2020-01-01 RX ORDER — PHYTONADIONE 5 MG/1
5 TABLET ORAL ONCE
Status: COMPLETED | OUTPATIENT
Start: 2020-01-01 | End: 2020-01-01

## 2020-01-01 RX ORDER — ONDANSETRON 4 MG/1
4 TABLET, ORALLY DISINTEGRATING ORAL ONCE
Status: COMPLETED | OUTPATIENT
Start: 2020-01-01 | End: 2020-01-01

## 2020-01-01 RX ORDER — WARFARIN SODIUM 2.5 MG/1
TABLET ORAL
Qty: 30 TABLET | Refills: 3
Start: 2020-01-01

## 2020-01-01 RX ORDER — ASPIRIN 81 MG/1
81 TABLET ORAL DAILY
Status: DISCONTINUED | OUTPATIENT
Start: 2020-01-01 | End: 2020-01-01 | Stop reason: HOSPADM

## 2020-01-01 RX ORDER — WARFARIN SODIUM 2.5 MG/1
2.5 TABLET ORAL DAILY
Status: DISCONTINUED | OUTPATIENT
Start: 2020-01-01 | End: 2020-01-01

## 2020-01-01 RX ORDER — SODIUM CHLORIDE 0.9 % (FLUSH) 0.9 %
10 SYRINGE (ML) INJECTION EVERY 12 HOURS SCHEDULED
Status: DISCONTINUED | OUTPATIENT
Start: 2020-01-01 | End: 2020-01-01 | Stop reason: HOSPADM

## 2020-01-01 RX ORDER — CARVEDILOL 25 MG/1
TABLET ORAL
Qty: 135 TABLET | Refills: 3 | Status: SHIPPED | OUTPATIENT
Start: 2020-01-01

## 2020-01-01 RX ORDER — CARVEDILOL 3.12 MG/1
3.12 TABLET ORAL 2 TIMES DAILY WITH MEALS
Status: DISCONTINUED | OUTPATIENT
Start: 2020-01-01 | End: 2020-01-01 | Stop reason: HOSPADM

## 2020-01-01 RX ORDER — BISACODYL 10 MG
10 SUPPOSITORY, RECTAL RECTAL DAILY PRN
Status: DISCONTINUED | OUTPATIENT
Start: 2020-01-01 | End: 2020-01-01 | Stop reason: HOSPADM

## 2020-01-01 RX ORDER — VENLAFAXINE 37.5 MG/1
75 TABLET ORAL DAILY
Status: DISCONTINUED | OUTPATIENT
Start: 2020-01-01 | End: 2020-01-01 | Stop reason: HOSPADM

## 2020-01-01 RX ORDER — KETOCONAZOLE 20 MG/G
CREAM TOPICAL ONCE
Status: COMPLETED | OUTPATIENT
Start: 2020-01-01 | End: 2020-01-01

## 2020-01-01 RX ORDER — WARFARIN SODIUM 2.5 MG/1
1.25 TABLET ORAL DAILY
Status: DISCONTINUED | OUTPATIENT
Start: 2020-01-01 | End: 2020-01-01

## 2020-01-01 RX ORDER — ZOLPIDEM TARTRATE 10 MG/1
5 TABLET ORAL NIGHTLY PRN
Status: DISCONTINUED | OUTPATIENT
Start: 2020-01-01 | End: 2020-01-01 | Stop reason: HOSPADM

## 2020-01-01 RX ORDER — SODIUM CHLORIDE 9 MG/ML
INJECTION, SOLUTION INTRAVENOUS CONTINUOUS
Status: DISCONTINUED | OUTPATIENT
Start: 2020-01-01 | End: 2020-01-01

## 2020-01-01 RX ADMIN — HYDROCHLOROTHIAZIDE 25 MG: 25 TABLET ORAL at 08:55

## 2020-01-01 RX ADMIN — CARVEDILOL 3.12 MG: 3.12 TABLET, FILM COATED ORAL at 09:34

## 2020-01-01 RX ADMIN — ONDANSETRON 4 MG: 2 INJECTION INTRAMUSCULAR; INTRAVENOUS at 15:03

## 2020-01-01 RX ADMIN — MAGNESIUM HYDROXIDE 30 ML: 400 SUSPENSION ORAL at 14:08

## 2020-01-01 RX ADMIN — ZOLPIDEM TARTRATE 5 MG: 10 TABLET, COATED ORAL at 20:02

## 2020-01-01 RX ADMIN — ONDANSETRON 4 MG: 2 INJECTION INTRAMUSCULAR; INTRAVENOUS at 00:19

## 2020-01-01 RX ADMIN — ZOLPIDEM TARTRATE 5 MG: 10 TABLET, COATED ORAL at 21:27

## 2020-01-01 RX ADMIN — ASPIRIN 81 MG: 81 TABLET, COATED ORAL at 08:55

## 2020-01-01 RX ADMIN — SODIUM CHLORIDE: 9 INJECTION, SOLUTION INTRAVENOUS at 06:03

## 2020-01-01 RX ADMIN — CARVEDILOL 3.12 MG: 3.12 TABLET, FILM COATED ORAL at 18:25

## 2020-01-01 RX ADMIN — ASPIRIN 81 MG: 81 TABLET, COATED ORAL at 08:02

## 2020-01-01 RX ADMIN — SACUBITRIL AND VALSARTAN 1 TABLET: 24; 26 TABLET, FILM COATED ORAL at 08:02

## 2020-01-01 RX ADMIN — ACETAMINOPHEN 650 MG: 325 TABLET ORAL at 10:32

## 2020-01-01 RX ADMIN — VENLAFAXINE 75 MG: 37.5 TABLET ORAL at 07:57

## 2020-01-01 RX ADMIN — Medication 10 ML: at 20:07

## 2020-01-01 RX ADMIN — DIGOXIN 125 MCG: 125 TABLET ORAL at 07:57

## 2020-01-01 RX ADMIN — DIGOXIN 125 MCG: 125 TABLET ORAL at 08:51

## 2020-01-01 RX ADMIN — VENLAFAXINE 75 MG: 37.5 TABLET ORAL at 08:11

## 2020-01-01 RX ADMIN — DIVALPROEX SODIUM 250 MG: 250 TABLET, FILM COATED, EXTENDED RELEASE ORAL at 09:55

## 2020-01-01 RX ADMIN — WARFARIN SODIUM 1.25 MG: 2.5 TABLET ORAL at 17:42

## 2020-01-01 RX ADMIN — SACUBITRIL AND VALSARTAN 1 TABLET: 24; 26 TABLET, FILM COATED ORAL at 09:47

## 2020-01-01 RX ADMIN — CARVEDILOL 3.12 MG: 3.12 TABLET, FILM COATED ORAL at 08:02

## 2020-01-01 RX ADMIN — SACUBITRIL AND VALSARTAN 1 TABLET: 24; 26 TABLET, FILM COATED ORAL at 19:54

## 2020-01-01 RX ADMIN — ASPIRIN 81 MG: 81 TABLET, COATED ORAL at 07:57

## 2020-01-01 RX ADMIN — WARFARIN SODIUM 5 MG: 5 TABLET ORAL at 18:01

## 2020-01-01 RX ADMIN — Medication 10 ML: at 08:03

## 2020-01-01 RX ADMIN — ACETAMINOPHEN 650 MG: 325 TABLET ORAL at 18:50

## 2020-01-01 RX ADMIN — ONDANSETRON 4 MG: 2 INJECTION INTRAMUSCULAR; INTRAVENOUS at 02:36

## 2020-01-01 RX ADMIN — SACUBITRIL AND VALSARTAN 1 TABLET: 24; 26 TABLET, FILM COATED ORAL at 07:57

## 2020-01-01 RX ADMIN — DIVALPROEX SODIUM 250 MG: 250 TABLET, FILM COATED, EXTENDED RELEASE ORAL at 07:57

## 2020-01-01 RX ADMIN — WARFARIN SODIUM 2.5 MG: 2.5 TABLET ORAL at 17:31

## 2020-01-01 RX ADMIN — SACUBITRIL AND VALSARTAN 1 TABLET: 24; 26 TABLET, FILM COATED ORAL at 21:43

## 2020-01-01 RX ADMIN — ASPIRIN 81 MG: 81 TABLET, COATED ORAL at 08:51

## 2020-01-01 RX ADMIN — DIVALPROEX SODIUM 250 MG: 250 TABLET, FILM COATED, EXTENDED RELEASE ORAL at 08:02

## 2020-01-01 RX ADMIN — CARVEDILOL 3.12 MG: 3.12 TABLET, FILM COATED ORAL at 18:01

## 2020-01-01 RX ADMIN — ACETAMINOPHEN 650 MG: 325 TABLET ORAL at 11:43

## 2020-01-01 RX ADMIN — CARVEDILOL 3.12 MG: 3.12 TABLET, FILM COATED ORAL at 17:19

## 2020-01-01 RX ADMIN — ACETAMINOPHEN 650 MG: 325 TABLET ORAL at 01:42

## 2020-01-01 RX ADMIN — HYDROCHLOROTHIAZIDE 25 MG: 25 TABLET ORAL at 09:54

## 2020-01-01 RX ADMIN — HYDROCHLOROTHIAZIDE 25 MG: 25 TABLET ORAL at 08:51

## 2020-01-01 RX ADMIN — KETOCONAZOLE: 20 CREAM TOPICAL at 02:27

## 2020-01-01 RX ADMIN — CARVEDILOL 3.12 MG: 3.12 TABLET, FILM COATED ORAL at 09:55

## 2020-01-01 RX ADMIN — CARVEDILOL 3.12 MG: 3.12 TABLET, FILM COATED ORAL at 08:51

## 2020-01-01 RX ADMIN — ZOLPIDEM TARTRATE 5 MG: 10 TABLET, COATED ORAL at 21:24

## 2020-01-01 RX ADMIN — ACETAMINOPHEN 650 MG: 325 TABLET ORAL at 11:45

## 2020-01-01 RX ADMIN — ONDANSETRON 4 MG: 4 TABLET, ORALLY DISINTEGRATING ORAL at 01:16

## 2020-01-01 RX ADMIN — VENLAFAXINE 75 MG: 37.5 TABLET ORAL at 08:52

## 2020-01-01 RX ADMIN — BISACODYL 10 MG: 10 SUPPOSITORY RECTAL at 03:53

## 2020-01-01 RX ADMIN — CARVEDILOL 3.12 MG: 3.12 TABLET, FILM COATED ORAL at 08:54

## 2020-01-01 RX ADMIN — SODIUM CHLORIDE: 9 INJECTION, SOLUTION INTRAVENOUS at 00:30

## 2020-01-01 RX ADMIN — DIVALPROEX SODIUM 250 MG: 250 TABLET, FILM COATED, EXTENDED RELEASE ORAL at 08:11

## 2020-01-01 RX ADMIN — Medication 10 ML: at 09:37

## 2020-01-01 RX ADMIN — ACETAMINOPHEN 650 MG: 325 TABLET ORAL at 11:21

## 2020-01-01 RX ADMIN — ROSUVASTATIN CALCIUM 5 MG: 10 TABLET, FILM COATED ORAL at 20:07

## 2020-01-01 RX ADMIN — DIGOXIN 125 MCG: 125 TABLET ORAL at 08:55

## 2020-01-01 RX ADMIN — ACETAMINOPHEN 650 MG: 325 TABLET ORAL at 06:02

## 2020-01-01 RX ADMIN — VENLAFAXINE 75 MG: 37.5 TABLET ORAL at 08:02

## 2020-01-01 RX ADMIN — ZOLPIDEM TARTRATE 5 MG: 10 TABLET, COATED ORAL at 21:44

## 2020-01-01 RX ADMIN — ACETAMINOPHEN 650 MG: 325 TABLET ORAL at 05:56

## 2020-01-01 RX ADMIN — WARFARIN SODIUM 1.25 MG: 2.5 TABLET ORAL at 16:54

## 2020-01-01 RX ADMIN — Medication 10 ML: at 10:55

## 2020-01-01 RX ADMIN — DIVALPROEX SODIUM 250 MG: 250 TABLET, FILM COATED, EXTENDED RELEASE ORAL at 08:51

## 2020-01-01 RX ADMIN — HYDROCHLOROTHIAZIDE 25 MG: 25 TABLET ORAL at 08:11

## 2020-01-01 RX ADMIN — CARVEDILOL 3.12 MG: 3.12 TABLET, FILM COATED ORAL at 08:11

## 2020-01-01 RX ADMIN — ROSUVASTATIN CALCIUM 5 MG: 10 TABLET, FILM COATED ORAL at 21:01

## 2020-01-01 RX ADMIN — CARVEDILOL 3.12 MG: 3.12 TABLET, FILM COATED ORAL at 17:42

## 2020-01-01 RX ADMIN — ROSUVASTATIN CALCIUM 5 MG: 10 TABLET, FILM COATED ORAL at 21:44

## 2020-01-01 RX ADMIN — PROMETHAZINE HYDROCHLORIDE 12.5 MG: 25 TABLET ORAL at 23:58

## 2020-01-01 RX ADMIN — ROSUVASTATIN CALCIUM 5 MG: 10 TABLET, FILM COATED ORAL at 21:24

## 2020-01-01 RX ADMIN — SACUBITRIL AND VALSARTAN 1 TABLET: 24; 26 TABLET, FILM COATED ORAL at 21:24

## 2020-01-01 RX ADMIN — PHYTONADIONE 5 MG: 5 TABLET ORAL at 10:01

## 2020-01-01 RX ADMIN — ONDANSETRON 4 MG: 2 INJECTION INTRAMUSCULAR; INTRAVENOUS at 18:52

## 2020-01-01 RX ADMIN — SACUBITRIL AND VALSARTAN 1 TABLET: 24; 26 TABLET, FILM COATED ORAL at 08:54

## 2020-01-01 RX ADMIN — VENLAFAXINE 75 MG: 37.5 TABLET ORAL at 09:33

## 2020-01-01 RX ADMIN — SODIUM CHLORIDE 250 ML: 9 INJECTION, SOLUTION INTRAVENOUS at 15:30

## 2020-01-01 RX ADMIN — SODIUM CHLORIDE: 9 INJECTION, SOLUTION INTRAVENOUS at 21:44

## 2020-01-01 RX ADMIN — ASPIRIN 81 MG: 81 TABLET, COATED ORAL at 08:12

## 2020-01-01 RX ADMIN — CARVEDILOL 3.12 MG: 3.12 TABLET, FILM COATED ORAL at 17:49

## 2020-01-01 RX ADMIN — ASPIRIN 81 MG: 81 TABLET, COATED ORAL at 09:54

## 2020-01-01 RX ADMIN — ZOLPIDEM TARTRATE 5 MG: 10 TABLET, COATED ORAL at 21:01

## 2020-01-01 RX ADMIN — Medication 10 ML: at 20:02

## 2020-01-01 RX ADMIN — ROSUVASTATIN CALCIUM 5 MG: 10 TABLET, FILM COATED ORAL at 20:01

## 2020-01-01 RX ADMIN — SACUBITRIL AND VALSARTAN 1 TABLET: 24; 26 TABLET, FILM COATED ORAL at 09:54

## 2020-01-01 RX ADMIN — VENLAFAXINE 75 MG: 37.5 TABLET ORAL at 08:54

## 2020-01-01 RX ADMIN — ACETAMINOPHEN 650 MG: 325 TABLET ORAL at 01:50

## 2020-01-01 RX ADMIN — SACUBITRIL AND VALSARTAN 1 TABLET: 24; 26 TABLET, FILM COATED ORAL at 20:01

## 2020-01-01 RX ADMIN — ONDANSETRON 4 MG: 2 INJECTION INTRAMUSCULAR; INTRAVENOUS at 08:12

## 2020-01-01 RX ADMIN — ONDANSETRON 4 MG: 2 INJECTION INTRAMUSCULAR; INTRAVENOUS at 22:36

## 2020-01-01 RX ADMIN — ROSUVASTATIN CALCIUM 5 MG: 10 TABLET, FILM COATED ORAL at 19:54

## 2020-01-01 RX ADMIN — ACETAMINOPHEN 650 MG: 325 TABLET ORAL at 20:28

## 2020-01-01 RX ADMIN — ASPIRIN 81 MG: 81 TABLET, COATED ORAL at 09:33

## 2020-01-01 RX ADMIN — SODIUM CHLORIDE: 9 INJECTION, SOLUTION INTRAVENOUS at 10:32

## 2020-01-01 RX ADMIN — DIGOXIN 125 MCG: 125 TABLET ORAL at 09:48

## 2020-01-01 RX ADMIN — PROMETHAZINE HYDROCHLORIDE 12.5 MG: 25 TABLET ORAL at 06:02

## 2020-01-01 RX ADMIN — SACUBITRIL AND VALSARTAN 1 TABLET: 24; 26 TABLET, FILM COATED ORAL at 20:28

## 2020-01-01 RX ADMIN — Medication 10 ML: at 21:01

## 2020-01-01 RX ADMIN — ACETAMINOPHEN 650 MG: 325 TABLET ORAL at 15:03

## 2020-01-01 RX ADMIN — DIGOXIN 125 MCG: 125 TABLET ORAL at 09:33

## 2020-01-01 RX ADMIN — SACUBITRIL AND VALSARTAN 1 TABLET: 24; 26 TABLET, FILM COATED ORAL at 21:01

## 2020-01-01 RX ADMIN — ONDANSETRON 4 MG: 2 INJECTION INTRAMUSCULAR; INTRAVENOUS at 14:07

## 2020-01-01 RX ADMIN — Medication 10 ML: at 09:55

## 2020-01-01 RX ADMIN — Medication 10 ML: at 14:08

## 2020-01-01 RX ADMIN — DIVALPROEX SODIUM 250 MG: 250 TABLET, FILM COATED, EXTENDED RELEASE ORAL at 09:34

## 2020-01-01 RX ADMIN — ACETAMINOPHEN 650 MG: 325 TABLET ORAL at 09:52

## 2020-01-01 RX ADMIN — ONDANSETRON 4 MG: 2 INJECTION INTRAMUSCULAR; INTRAVENOUS at 09:46

## 2020-01-01 RX ADMIN — DIGOXIN 125 MCG: 125 TABLET ORAL at 08:02

## 2020-01-01 RX ADMIN — WARFARIN SODIUM 3 MG: 2 TABLET ORAL at 17:19

## 2020-01-01 RX ADMIN — CARVEDILOL 3.12 MG: 3.12 TABLET, FILM COATED ORAL at 17:18

## 2020-01-01 RX ADMIN — DIVALPROEX SODIUM 250 MG: 250 TABLET, FILM COATED, EXTENDED RELEASE ORAL at 08:54

## 2020-01-01 RX ADMIN — ONDANSETRON 4 MG: 2 INJECTION INTRAMUSCULAR; INTRAVENOUS at 08:02

## 2020-01-01 RX ADMIN — ZOLPIDEM TARTRATE 5 MG: 10 TABLET, COATED ORAL at 20:49

## 2020-01-01 RX ADMIN — Medication 10 ML: at 20:29

## 2020-01-01 RX ADMIN — ROSUVASTATIN CALCIUM 5 MG: 10 TABLET, FILM COATED ORAL at 20:28

## 2020-01-01 RX ADMIN — ACETAMINOPHEN 650 MG: 325 TABLET ORAL at 02:43

## 2020-01-01 RX ADMIN — HYDROCHLOROTHIAZIDE 25 MG: 25 TABLET ORAL at 07:57

## 2020-01-01 RX ADMIN — DIGOXIN 125 MCG: 125 TABLET ORAL at 09:54

## 2020-01-01 RX ADMIN — ONDANSETRON 4 MG: 2 INJECTION INTRAMUSCULAR; INTRAVENOUS at 03:00

## 2020-01-01 RX ADMIN — HYDROCHLOROTHIAZIDE 25 MG: 25 TABLET ORAL at 09:33

## 2020-01-01 RX ADMIN — ACETAMINOPHEN 650 MG: 325 TABLET ORAL at 17:31

## 2020-01-01 RX ADMIN — VENLAFAXINE 75 MG: 37.5 TABLET ORAL at 09:54

## 2020-01-01 RX ADMIN — Medication 10 ML: at 09:48

## 2020-01-01 RX ADMIN — ACETAMINOPHEN 650 MG: 325 TABLET ORAL at 01:51

## 2020-01-01 RX ADMIN — ZOLPIDEM TARTRATE 5 MG: 10 TABLET, COATED ORAL at 20:29

## 2020-01-01 RX ADMIN — SACUBITRIL AND VALSARTAN 1 TABLET: 24; 26 TABLET, FILM COATED ORAL at 20:07

## 2020-01-01 RX ADMIN — Medication 10 ML: at 22:19

## 2020-01-01 RX ADMIN — DIVALPROEX SODIUM 250 MG: 250 TABLET, FILM COATED, EXTENDED RELEASE ORAL at 09:47

## 2020-01-01 RX ADMIN — SACUBITRIL AND VALSARTAN 1 TABLET: 24; 26 TABLET, FILM COATED ORAL at 09:33

## 2020-01-01 RX ADMIN — ASPIRIN 81 MG: 81 TABLET, COATED ORAL at 09:48

## 2020-01-01 RX ADMIN — ACETAMINOPHEN 650 MG: 325 TABLET ORAL at 12:05

## 2020-01-01 RX ADMIN — PHYTONADIONE 5 MG: 5 TABLET ORAL at 01:16

## 2020-01-01 RX ADMIN — ONDANSETRON 4 MG: 2 INJECTION INTRAMUSCULAR; INTRAVENOUS at 20:05

## 2020-01-01 RX ADMIN — VENLAFAXINE 75 MG: 37.5 TABLET ORAL at 09:47

## 2020-01-01 RX ADMIN — ACETAMINOPHEN 650 MG: 325 TABLET ORAL at 01:58

## 2020-01-01 RX ADMIN — ACETAMINOPHEN 650 MG: 325 TABLET ORAL at 12:54

## 2020-01-01 RX ADMIN — SACUBITRIL AND VALSARTAN 1 TABLET: 24; 26 TABLET, FILM COATED ORAL at 08:52

## 2020-01-01 RX ADMIN — ACETAMINOPHEN 650 MG: 325 TABLET ORAL at 20:01

## 2020-01-01 RX ADMIN — PROMETHAZINE HYDROCHLORIDE 12.5 MG: 25 TABLET ORAL at 22:43

## 2020-01-01 RX ADMIN — HYDROCHLOROTHIAZIDE 25 MG: 25 TABLET ORAL at 08:02

## 2020-01-01 RX ADMIN — ACETAMINOPHEN 650 MG: 325 TABLET ORAL at 17:21

## 2020-01-01 RX ADMIN — WARFARIN SODIUM 1.25 MG: 2.5 TABLET ORAL at 18:25

## 2020-01-01 RX ADMIN — WARFARIN SODIUM 1.25 MG: 2.5 TABLET ORAL at 17:49

## 2020-01-01 RX ADMIN — Medication 10 ML: at 08:55

## 2020-01-01 RX ADMIN — SACUBITRIL AND VALSARTAN 1 TABLET: 24; 26 TABLET, FILM COATED ORAL at 08:11

## 2020-01-01 RX ADMIN — DIGOXIN 125 MCG: 125 TABLET ORAL at 08:12

## 2020-01-01 RX ADMIN — CARVEDILOL 3.12 MG: 3.12 TABLET, FILM COATED ORAL at 07:57

## 2020-01-01 ASSESSMENT — PAIN SCALES - GENERAL
PAINLEVEL_OUTOF10: 0
PAINLEVEL_OUTOF10: 3
PAINLEVEL_OUTOF10: 0
PAINLEVEL_OUTOF10: 8
PAINLEVEL_OUTOF10: 0
PAINLEVEL_OUTOF10: 2
PAINLEVEL_OUTOF10: 5
PAINLEVEL_OUTOF10: 0
PAINLEVEL_OUTOF10: 8
PAINLEVEL_OUTOF10: 5
PAINLEVEL_OUTOF10: 0
PAINLEVEL_OUTOF10: 3
PAINLEVEL_OUTOF10: 0
PAINLEVEL_OUTOF10: 0
PAINLEVEL_OUTOF10: 9
PAINLEVEL_OUTOF10: 8
PAINLEVEL_OUTOF10: 7
PAINLEVEL_OUTOF10: 0
PAINLEVEL_OUTOF10: 6
PAINLEVEL_OUTOF10: 3
PAINLEVEL_OUTOF10: 9
PAINLEVEL_OUTOF10: 0
PAINLEVEL_OUTOF10: 8
PAINLEVEL_OUTOF10: 0
PAINLEVEL_OUTOF10: 3
PAINLEVEL_OUTOF10: 0
PAINLEVEL_OUTOF10: 8
PAINLEVEL_OUTOF10: 5
PAINLEVEL_OUTOF10: 0
PAINLEVEL_OUTOF10: 7
PAINLEVEL_OUTOF10: 0
PAINLEVEL_OUTOF10: 8
PAINLEVEL_OUTOF10: 0
PAINLEVEL_OUTOF10: 0
PAINLEVEL_OUTOF10: 8
PAINLEVEL_OUTOF10: 0
PAINLEVEL_OUTOF10: 3
PAINLEVEL_OUTOF10: 8
PAINLEVEL_OUTOF10: 8
PAINLEVEL_OUTOF10: 0
PAINLEVEL_OUTOF10: 4
PAINLEVEL_OUTOF10: 0
PAINLEVEL_OUTOF10: 2
PAINLEVEL_OUTOF10: 4
PAINLEVEL_OUTOF10: 3
PAINLEVEL_OUTOF10: 3
PAINLEVEL_OUTOF10: 0
PAINLEVEL_OUTOF10: 9
PAINLEVEL_OUTOF10: 0
PAINLEVEL_OUTOF10: 0
PAINLEVEL_OUTOF10: 8
PAINLEVEL_OUTOF10: 6
PAINLEVEL_OUTOF10: 0

## 2020-01-01 ASSESSMENT — PAIN DESCRIPTION - DESCRIPTORS
DESCRIPTORS: ACHING
DESCRIPTORS: HEADACHE
DESCRIPTORS: ACHING
DESCRIPTORS: HEADACHE
DESCRIPTORS: HEADACHE
DESCRIPTORS: HEADACHE;SORE
DESCRIPTORS: HEADACHE

## 2020-01-01 ASSESSMENT — PAIN DESCRIPTION - ORIENTATION
ORIENTATION: POSTERIOR
ORIENTATION: POSTERIOR

## 2020-01-01 ASSESSMENT — ENCOUNTER SYMPTOMS
RHINORRHEA: 0
NAUSEA: 1
SORE THROAT: 0
COUGH: 0
EYE ITCHING: 0
SINUS PRESSURE: 0
ABDOMINAL DISTENTION: 0
EYE REDNESS: 0
EYE PAIN: 0
ABDOMINAL PAIN: 0
SHORTNESS OF BREATH: 0
RHINORRHEA: 0
SHORTNESS OF BREATH: 0
APNEA: 0
COUGH: 0
EYE PAIN: 0

## 2020-01-01 ASSESSMENT — PAIN DESCRIPTION - PAIN TYPE
TYPE: ACUTE PAIN

## 2020-01-01 ASSESSMENT — SLEEP AND FATIGUE QUESTIONNAIRES
HOW LIKELY ARE YOU TO NOD OFF OR FALL ASLEEP WHILE SITTING AND READING: 0
HOW LIKELY ARE YOU TO NOD OFF OR FALL ASLEEP WHILE LYING DOWN TO REST IN THE AFTERNOON WHEN CIRCUMSTANCES PERMIT: 0
HOW LIKELY ARE YOU TO NOD OFF OR FALL ASLEEP WHILE SITTING AND TALKING TO SOMEONE: 0
ESS TOTAL SCORE: 1
HOW LIKELY ARE YOU TO NOD OFF OR FALL ASLEEP WHEN YOU ARE A PASSENGER IN A CAR FOR AN HOUR WITHOUT A BREAK: 0
HOW LIKELY ARE YOU TO NOD OFF OR FALL ASLEEP WHILE SITTING QUIETLY AFTER LUNCH WITHOUT ALCOHOL: 0
HOW LIKELY ARE YOU TO NOD OFF OR FALL ASLEEP IN A CAR, WHILE STOPPED FOR A FEW MINUTES IN TRAFFIC: 0
HOW LIKELY ARE YOU TO NOD OFF OR FALL ASLEEP WHILE SITTING INACTIVE IN A PUBLIC PLACE: 0
HOW LIKELY ARE YOU TO NOD OFF OR FALL ASLEEP WHILE WATCHING TV: 1

## 2020-01-01 ASSESSMENT — PAIN - FUNCTIONAL ASSESSMENT: PAIN_FUNCTIONAL_ASSESSMENT: ACTIVITIES ARE NOT PREVENTED

## 2020-01-01 ASSESSMENT — PAIN DESCRIPTION - LOCATION
LOCATION: HEAD
LOCATION: HEAD
LOCATION: NECK
LOCATION: HEAD
LOCATION: HEAD;RIB CAGE
LOCATION: HEAD

## 2020-01-01 ASSESSMENT — PAIN DESCRIPTION - FREQUENCY: FREQUENCY: INTERMITTENT

## 2020-01-01 ASSESSMENT — PAIN DESCRIPTION - PROGRESSION: CLINICAL_PROGRESSION: GRADUALLY WORSENING

## 2020-01-01 ASSESSMENT — PAIN DESCRIPTION - ONSET: ONSET: PROGRESSIVE

## 2020-01-28 NOTE — PROGRESS NOTES
"Your cholesterol is abnormal, please use the recommendations below and recheck labs in 6-12 months.    Ways to improve your cholesterol...    1- Eats less saturated fats (including avoiding \"trans\" fats).    2 - Eat more unsaturated fats  - found in vege  tables, grains, and tree nuts.   Also by replacing butter with canola oil or olive oil.    3 - Eat more nuts.   1-2 ounces (a small handful) of almonds, walnuts, hazelnuts or pecans once a  day in place of other less healthy snacks.    4 - Eat more high   fiber foods - vegetables and whole grains including oat bran, oats, beans, peas, and flax seed.    5 - Eat more fish - such as salmon, tuna, mackerel, and sardines.  1 or 2 six ounce servings per week is a healthy replacement for other proteins.    6 - E  xercise for at least 120 minutes per week - which is equal to 30 minutes 4 days per week.    Lorenzo Brown D.O.    " Carelink transmission shows normal sensing and pacing function. Effective pacing is low. Will arrange pt to be seen for evaluation. See interrogation for more details. Optivol is within normal range.

## 2020-02-10 NOTE — PROGRESS NOTES
crossing off each day to make sure she is taking the right dose and not missing.      Referring cardiologist is Dr. Marta Degroot  INR (no units)   Date Value   02/10/2020 1.7   01/28/2020 1.3   12/17/2019 2.2   12/03/2019 1.9   09/24/2018 3.20 (H)   09/18/2018 2.8   08/29/2018 2.1   07/30/2018 1.9

## 2020-02-10 NOTE — PROGRESS NOTES
Patient comes in for programming evaluation for her defibrillator. Effective pacing is low. Patient has Epicardial LV lead. Today we changed the LV to 2.50 V / 1.00 ms to see if that will increase effective pacing. Please see interrogation for more detail. Patient will follow up in 3 months in office or remotely. Optivol is within normal range.

## 2020-02-21 NOTE — PROGRESS NOTES
1.7   01/28/2020 1.3   12/17/2019 2.2   12/03/2019 1.9   09/24/2018 3.20 (H)   09/18/2018 2.8   08/29/2018 2.1   07/30/2018 1.9

## 2020-03-13 NOTE — PROGRESS NOTES
Ms. Dipak Capps is a 66 y.o. y/o female with history of Afib, CVA in 2008   She presents today for anticoagulation monitoring and adjustment. Pertinent PMH: CHF, ICD defibrillator/ pacemaker. 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 Patient Reported Findings:  Yes     No  [x]   [x]       Patient verifies current dosing regimen as listed  - can recite dose correctly    []   [x]       S/S bleeding/bruising/swelling/SOB- denies   []   [x]       Blood in urine or stool- denies   []   [x]       Procedures scheduled in the future at this time.  []   [x]       Missed Dose- denies, doesn't think she missed    []   [x]       Extra Dose   []   [x]       Change in medications -denies   []   [x]       Change in health/diet/appetite. She does not eat many vegetables generally. Has lost about 30lbs by eliminating fast food and soft drinks. --> reports about 1-2 servings of greens per week---> no changes ---> had more greens possibly, no NVD    []   [x]       Change in alcohol use Does not drink alcohol  []   [x]       Change in activity  []   [x]       Hospital admission  []   [x]       Emergency department visit  [x]   []       Other complaints Has increased R hand tremors and L leg is somewhat less responsive. She reports that Dr. Bruna Grande does not want to give her any additional medications d/t her condition and current medication regimen. Clinical Outcomes:  Yes     No  []   [x]       Major bleeding event  []   [x]       Thromboembolic event    Duration of warfarin Therapy: indefinite  INR Range:  2.0-3.0    INR is 1.6 today dt more greens. Stopped using pillbox so not sure about missed dose. Suggested using AVS and crossing off each day to make sure she is taking the right dose and not missing. Recommended going back to pillbox since INR was therapeutic on this dose at last visit. Will boost and continue, may need to increase dose again at next visit. Take 5mg tonight then continue dose of 1.25mg on Sun, Tues and Thurs and 2.5mg all other days. Encouraged to maintain a consistency of vegetables/salads.   Recheck INR in 10 days, 3/23, then return to 6 weeks     Referring cardiologist is Dr. Sameer Rubio  INR (no units)   Date Value   03/13/2020 1.6   02/21/2020 2.1   02/10/2020 1.7   01/28/2020 1.3   09/24/2018 3.20 (H)   09/18/2018 2.8   08/29/2018 2.1   07/30/2018 1.9

## 2020-03-17 NOTE — LETTER
[facility] Sleep Medicine  [address] [facility] [address]  Phone: [phone]  Fax: [phone]    March 17, 2020       Patient: Eliana Rios   MR Number: [id number]   YOB: 1945   Date of Visit: 3/17/2020       Jose Clayton was seen for a follow up visit today. Here is my assessment and plan as well as an attached copy of her visit today:    LBBB (left bundle branch block)  Chronic- Stable. Cont meds per PCP and other physicians. Systolic CHF, chronic (HCC)  Chronic- Stable. Cont meds per PCP and other physicians. Atrial fibrillation  Chronic- Stable. Cont meds per PCP and other physicians. Obstructive apnea  Reviewed compliance download with pt. Supplies and parts as needed for her machine. These are medically necessary. Continue medications per her PCP and other physicians. Limit caffeine use after 3pm.  Encouraged her to work on weight loss through diet and exercise. Diagnoses of LBBB (left bundle branch block), [facility]st[facility] ([facility] and Atrial fibrillation, unspecified type [facility]) were pertinent to this visit. The chronic medical conditions listed are directly related to the primary diagnosis listed above. The management of the primary diagnosis affects the secondary diagnosis and vice versa. If you have questions or concerns, please do not hesitate to call me. I look forward to following Sandra Urias along with you.     Sincerely,    MISTI Fernandes - CNP    CC providers:  Bart Smith MD  [address] [place] [address] Ave: [phone]

## 2020-03-17 NOTE — PROGRESS NOTES
Marley Bradley         : 1945    Diagnosis: [x] EDEN (G47.33) [] CSA (G47.31) [] Apnea (G47.30)   Length of Need: [x] 12 Months [] 99 Months [] Other:    Machine ([place]!): [x] Respironics Dream Station      Auto [] ResMed AirSense     Auto [] Other:     []  CPAP () [] Bilevel ()   Mode: [] Auto [] Spontaneous    Mode: [] Auto [] Spontaneous                            Comfort Settings:   - Ramp Pressure:  cmH2O                                        - Ramp time: 15 min                                     -  Flex/EPR - 3 full time                                    - For ResMed Bilevel (TiMax-4 sec   TiMin- 0.2 sec)     Humidifier: [x] Heated ()        [x] Water chamber replacement ()/ 1 per 6 months        Mask:   [] Nasal () /1 per 3 months [x] Full Face () /1 per 3 months   [] Patient choice -Size and fit mask [x] Patient Choice - Size and fit mask   [] Dispense:  [] Dispense:    [] Headgear () / 1 per 3 months [x] Headgear () / 1 per 3 months   [] Replacement Nasal Cushion ()/2 per month [x] Interface Replacement ()/1 per month   [] Replacement Nasal Pillows ()/2 per month         Tubing: [x] Heated ()/1 per 3 months    [] Standard ()/1 per 3 months [] Other:           Filters: [x] Non-disposable ()/1 per 6 months     [x] Ultra-Fine, Disposable ()/2 per month        Miscellaneous: [] Chin Strap ()/ 1 per 6 months [] O2 bleed-in:       LPM   [] Oximetry on CPAP/Bilevel []  Other:    [x] Modem: ()         Start Order Date: 20    MEDICAL JUSTIFICATION:  I, the undersigned, certify that the above prescribed supplies are medically necessary for this patients wellbeing. In my opinion, the supplies are both reasonable and necessary in reference to accepted standards of medicalpractice in treatment of this patients condition.     MISTI Chen - RADHA      NPI: [id number]       Order Signed Date:

## 2020-03-17 NOTE — ASSESSMENT & PLAN NOTE
Reviewed compliance download with pt. Supplies and parts as needed for her machine. These are medically necessary. Continue medications per her PCP and other physicians. Limit caffeine use after 3pm.  Encouraged her to work on weight loss through diet and exercise. Diagnoses of LBBB (left bundle branch block), Systolic CHF, chronic ([facility] [address] and Atrial fibrillation, unspecified type [facility]) were pertinent to this visit. The chronic medical conditions listed are directly related to the primary diagnosis listed above. The management of the primary diagnosis affects the secondary diagnosis and vice versa.

## 2020-03-17 NOTE — PROGRESS NOTES
Gloria Meza         : 1945    Diagnosis: [x] EDEN (G47.33) [] CSA (G47.31) [] Apnea (G47.30)   Length of Need: [x] 12 Months [] 99 Months [] Other:    Machine (CAR[place]INA!): [x] Respironics Dream Station      Auto [] ResMed AirSense     Auto [] Other:     []  CPAP () [] Bilevel ()   Mode: [] Auto [] Spontaneous    Mode: [] Auto [] Spontaneous                            Comfort Settings:   - Ramp Pressure:  cmH2O                                        - Ramp time: 15 min                                     -  Flex/EPR - 3 full time                                    - For ResMed Bilevel (TiMax-4 sec   TiMin- 0.2 sec)     Humidifier: [x] Heated ()        [x] Water chamber replacement ()/ 1 per 6 months        Mask:   [x] Nasal () /1 per 3 months [] Full Face () /1 per 3 months   [x] Patient choice -Size and fit mask [] Patient Choice - Size and fit mask   [] Dispense:  [] Dispense:    [x] Headgear () / 1 per 3 months [] Headgear () / 1 per 3 months   [x] Replacement Nasal Cushion ()/2 per month [] Interface Replacement ()/1 per month   [] Replacement Nasal Pillows ()/2 per month         Tubing: [x] Heated ()/1 per 3 months    [] Standard ()/1 per 3 months [] Other:           Filters: [x] Non-disposable ()/1 per 6 months     [x] Ultra-Fine, Disposable ()/2 per month        Miscellaneous: [] Chin Strap ()/ 1 per 6 months [] O2 bleed-in:       LPM   [] Oximetry on CPAP/Bilevel []  Other:    [x] Modem: ()         Start Order Date: 20    MEDICAL JUSTIFICATION:  I, the undersigned, certify that the above prescribed supplies are medically necessary for this patients wellbeing. In my opinion, the supplies are both reasonable and necessary in reference to accepted standards of medicalpractice in treatment of this patients condition.     MISTI Osman - RADHA      NPI: [id number]       Order Signed Date:

## 2020-03-17 NOTE — PROGRESS NOTES
while at home  [x] Yes  [] No     Difficulties falling asleep  [] Yes  [x] No   Difficulties staying asleep  [] Yes  [x] No   Approximate time to bed  9pm   Approximate wake time  6-7am   Taking Naps  occasional   If taking naps usual length  30 minutes  [] NA   If taking naps using the machine  [] Yes  [x] No  [] NA [] With and With out    Drowsy when driving  [] Yes  [x] No     Does patient carry a DOT/CDL  [] Yes  [x] No     Does patient carry FAA/Pilots License   [] Yes  [x] No      Any concerns noted with the machine at this time  [] Yes  [x] No        Diagnosis Orders   1. Obstructive apnea     2. LBBB (left bundle branch block)     3. Systolic CHF, chronic ([facility]     4. Atrial fibrillation, unspecified type [facility])         The chronic medical conditions listed are directly related to the primary diagnosis listed above. The management of the primary diagnosis affects the secondary diagnosis and vice versa. Review of Systems   Constitutional: Negative for appetite change, chills, fatigue and fever. HENT: Negative for congestion, nosebleeds, rhinorrhea and sinus pressure. Eyes: Negative for pain and redness. Respiratory: Negative for apnea, cough and shortness of breath. Cardiovascular: Negative for chest pain and palpitations. Gastrointestinal: Negative for abdominal distention and abdominal pain. Neurological: Negative for dizziness and headaches. Psychiatric/Behavioral: Negative for sleep disturbance.        Social History     Socioeconomic History    Marital status:      Spouse name: Not on file    Number of children: Not on file    Years of education: Not on file    Highest education level: Not on file   Occupational History    Not on file   Social Needs    Financial resource strain: Not on file    Food insecurity     Worry: Not on file     Inability: Not on file    Transportation needs     Medical: Not on file     Non-medical: Not on file   Tobacco Use    Smoking status: Former Smoker     Packs/day: 0.25     Years: 2.00     Pack years: 0.50     Last attempt to quit: 1981     Years since quittin.5    Smokeless tobacco: Never Used   Substance and Sexual Activity    Alcohol use: No    Drug use: No    Sexual activity: Never     Comment:    Lifestyle    Physical activity     Days per week: Not on file     Minutes per session: Not on file    Stress: Not on file   Relationships    Social connections     Talks on phone: Not on file     Gets together: Not on file     Attends Anabaptism service: Not on file     Active member of club or organization: Not on file     Attends meetings of clubs or organizations: Not on file     Relationship status: Not on file    Intimate partner violence     Fear of current or ex partner: Not on file     Emotionally abused: Not on file     Physically abused: Not on file     Forced sexual activity: Not on file   Other Topics Concern    Not on file   Social History Narrative    Not on file       Prior to Admission medications    Medication Sig Start Date End Date Taking?  Authorizing Provider   digoxin (LANOXIN) 125 MCG tablet TAKE 1 TABLET DAILY 19  Yes Rut Abreu MD   sacubitril-valsartan (ENTRESTO) 24-26 MG per tablet TAKE 1/2 TABLET BY MOUTH TWICE DAILY 19  Yes Rut Abreu MD   divalproex (DEPAKOTE ER) 500 MG extended release tablet TAKE 1 TABLET DAILY 10/7/19  Yes Perlita Spangler MD   venlafaxine (EFFEXOR) 37.5 MG tablet TAKE 1 TABLET EVERY EVENING  Patient taking differently: Take 75 mg by mouth daily  19  Yes Rut Abreu MD   simvastatin (ZOCOR) 20 MG tablet TAKE 1 TABLET NIGHTLY 19  Yes Rut Abreu MD   hydrochlorothiazide (HYDRODIURIL) 25 MG tablet Take 1 tablet by mouth every morning 19  Yes Rut Abreu MD   carvedilol (COREG) 25 MG tablet TAKE ONE-HALF (1/2) TABLET (12.5 MG) IN THE MORNING AND 1 TABLET (25 MG) IN THE EVENING 3/25/19  Yes Rut Abreu MD   zolpidem (AMBIEN) 10 MG tablet Take 10 mg by mouth nightly as needed. Yes Historical Provider, MD   warfarin (COUMADIN) 5 MG tablet As directed    Patient taking differently: Managed by [facility] 1/9/12  Yes Britta Renee MD   aspirin 81 MG EC tablet Take 81 mg by mouth daily.      Yes Historical Provider, MD       Allergies as of 03/17/2020 - Review Complete 03/17/2020   Allergen Reaction Noted    Seasonal  08/25/2017       Patient Active Problem List   Diagnosis    Peripheral vascular disease ([address]    Atrial fibrillation    Cardiomyopathy ([address]    LBBB (left bundle branch block)    Cerebral infarction ([address]    Systolic CHF, chronic (HCC)    Seizure disorder    Chronic anticoagulation    AICD (automatic cardioverter/defibrillator) present    Partial epilepsy with impairment of consciousness ([address]    Other paralytic syndrome affecting nondominant side, late effect of cerebrovascular disease    Depression    Fatigue    Hemiparesis affecting left side as late effect of cerebrovascular accident ([address]    Hyperlipidemia    Obstructive apnea    Partial symptomatic epilepsy with complex partial seizures, not intractable, without status epilepticus ([address]    Parkinsonian tremor ([address]       Past Medical History:   Diagnosis Date    CHF (congestive heart failure) (HCC)     CVA (cerebral infarction)     History of verrucae (wart) excision     Hyperlipidemia     Hypertension     Leg pain     Obstructive apnea     Unspecified cerebral artery occlusion with cerebral infarction     Valvular disease        Past Surgical History:   Procedure Laterality Date    ABSCESS DRAINAGE      CARDIAC DEFIBRILLATOR PLACEMENT      DENTAL SURGERY      extraction of all teeth for dentures       Family History   Problem Relation Age of Onset    Cancer Mother     Stroke Father        Vitals:  Weight BMI   Wt Readings from Last 3 Encounters:   03/17/20 167 lb (75.8 kg)   11/01/19 167 lb (75.8 kg)   10/07/19 166 lb (75.3 kg)    Body mass index visit for mask fitting   -F/U: 12 month. No orders of the defined types were placed in this encounter. No orders of the defined types were placed in this encounter. No orders of the defined types were placed in this encounter.       Yosvany Mcarthur, MSN, RN, CNP

## 2020-04-09 NOTE — ADDENDUM NOTE
Addended by: Jerod Kam on: 4/9/2020 08:47 AM     Modules accepted: Orders Patient Information     Patient Name MRN Sex Merle Tierney [id number] Female 1955      Progress Notes by Akin Sánchez MD at 4/3/2017  9:30 AM     Author:  Akin Sánchez MD Service:  (none) Author Type:  Physician     Filed:  4/3/2017 10:10 AM Encounter Date:  4/3/2017 Status:  Signed     :  Akin Sánchez MD (Physician)            SUBJECTIVE:    Merle Herrera is a 62 y.o. female who presents for right shoulder injury    HPI    She fell on the ice after an ice ej on 3/17.  Landed on the left side, braced herself with her right hand.  Now a pain in anterior shoulder and bicep.  Mostly with reaching overhead.  Buckling seatbelt and dressing is hard.  Pain is 7/10.  No previous injuries.  Has been using heat and ice, OTC NSAIDs.  Heat feels the best, the other treatments are minimally helpful. Hard to sleep due to pain.    No Known Allergies,   Current Outpatient Prescriptions on File Prior to Visit       Medication  Sig Dispense Refill     aspirin enteric coated 81 mg tablet Take 1 tablet by mouth once daily with a meal.  0     calcium carbonate-vitamin D3, 600 mg-400 unit, (CALCIUM 600 + D) tablet Take 1 tablet by mouth 2 times daily with meals.  0     chlorthalidone (HYGROTON) 25 mg tablet Take 1 tablet by mouth once daily. 90 tablet 3     cholecalciferol (VITAMIN D) 1,000 unit tablet Take 1 tablet by mouth once daily.  0     enalapril (VASOTEC) 20 mg tablet Take 1 tablet by mouth once daily. 90 tablet 3     omega-3 fatty acids-vitamin E (FISH OIL) 1,000 mg cap Take 1 capsule by mouth 2 times daily.  0     pravastatin (PRAVACHOL) 20 mg tablet Take 1 tablet by mouth at bedtime. 90 tablet 3     No current facility-administered medications on file prior to visit.    ,   Past Medical History:     Diagnosis  Date     Hypertension      Osteoarthritis 2013    Hands, knees       Tremor 2014    Isolated head tremor      and   Past Surgical History:      Procedure  Laterality Date      COLONOSCOPY SCREENING  04/11/2006    normal       KNEE REPLACEMENT Left 2008     KNEE REPLACEMENT Right 04/17/2014       REVIEW OF SYSTEMS:  Review of Systems   Constitutional: Negative for chills and fever.   Musculoskeletal: Positive for falls and joint pain.   Neurological: Negative for tingling.       OBJECTIVE:  /82  Resp 14  Wt 103.1 kg (227 lb 6.4 oz)  BMI 41.32 kg/m2    EXAM:   Physical Exam   Constitutional: She is oriented to person, place, and time and well-developed, well-nourished, and in no distress. No distress.   Musculoskeletal:   Right shoulder with full range of motion.  Mild pain with impingement and empty can testing.  No pain on palpation.  Neg aprehension sign.   Neurological: She is alert and oriented to person, place, and time.   Skin: She is not diaphoretic.   Psychiatric: Memory, affect and judgment normal.       ASSESSMENT/PLAN:    ICD-10-CM    1. Shoulder strain, right, initial encounter S46.911A         Plan:  Exam is reassuring.  Discussed with her options such as physical therapy, different NSADIs and eventually even a steroid injection.  Will next try mobic and if she wants therapy.    Akin Sánchez MD ....................  4/3/2017   10:09 AM

## 2020-04-13 NOTE — PROGRESS NOTES
vegetables/salads.   Recheck INR in 5 weeks, 5/18     Referring cardiologist is Dr. Laura Rodriguez  INR (no units)   Date Value   04/13/2020 3.00   03/23/2020 2.7   03/13/2020 1.6   02/21/2020 2.1   02/10/2020 1.7   09/24/2018 3.20 (H)   09/18/2018 2.8   08/29/2018 2.1

## 2020-05-05 NOTE — TELEPHONE ENCOUNTER
Spoke with pt relayed test results per Dr. Noemi Souza. Pt verbalized understanding.    ----- Message from Ilda Thorpe MD sent at 5/5/2020  4:53 PM EDT -----  Please call patient and let her know that her labs look okay. No changes. We can discuss further at the next office visit.   [place]

## 2020-05-18 NOTE — PROGRESS NOTES
Date Value   05/18/2020 3.3   04/13/2020 3.00   03/23/2020 2.7   03/13/2020 1.6   02/21/2020 2.1   09/24/2018 3.20 (H)   09/18/2018 2.8   08/29/2018 2.1     CLINICAL PHARMACY CONSULT: MED RECONCILIATION/REVIEW ADDENDUM    For Pharmacy Admin Tracking Only    PHSO: No  Total # of Interventions Recommended: 1  - Updated Order #: 1 Updated Order Reason(s):  Other  - Maintenance Safety Lab Monitoring #: 1  Total Interventions Accepted: 1  Time Spent (min): 15    Navdeep Covington, BeccaD

## 2020-06-09 NOTE — PROGRESS NOTES
carvedilol (COREG) 25 MG tablet TAKE ONE-HALF (1/2) TABLET (12.5 MG) IN THE MORNING AND 1 TABLET (25 MG) IN THE EVENING 135 tablet 3    digoxin (LANOXIN) 125 MCG tablet TAKE 1 TABLET DAILY 90 tablet 4    sacubitril-valsartan (ENTRESTO) 24-26 MG per tablet TAKE 1/2 TABLET BY MOUTH TWICE DAILY 90 tablet 3    venlafaxine (EFFEXOR) 37.5 MG tablet TAKE 1 TABLET EVERY EVENING (Patient taking differently: Take 75 mg by mouth daily ) 90 tablet 4    hydrochlorothiazide (HYDRODIURIL) 25 MG tablet Take 1 tablet by mouth every morning 90 tablet 3    aspirin 81 MG EC tablet Take 81 mg by mouth daily.  zolpidem (AMBIEN) 10 MG tablet Take 10 mg by mouth nightly as needed. No current facility-administered medications for this visit.         Immunization History   Administered Date(s) Administered    Influenza Vaccine, unspecified formulation 09/16/2016, 10/29/2018    Influenza Virus Vaccine 10/10/2007    Influenza, High Dose (Fluzone 65 yrs and older) 09/12/2017, 10/18/2018       Patient Active Problem List   Diagnosis    Peripheral vascular disease ([address]    Atrial fibrillation    Cardiomyopathy ([address]    LBBB (left bundle branch block)    Cerebral infarction ([address]    Systolic CHF, chronic (HCC)    Seizure disorder    Chronic anticoagulation    AICD (automatic cardioverter/defibrillator) present    Partial epilepsy with impairment of consciousness ([address]    Other paralytic syndrome affecting nondominant side, late effect of cerebrovascular disease    Depression    Fatigue    Hemiparesis affecting left side as late effect of cerebrovascular accident ([address]    Hyperlipidemia    Obstructive apnea    Partial symptomatic epilepsy with complex partial seizures, not intractable, without status epilepticus ([address]    Parkinsonian tremor ([address]       Past Medical History:   Diagnosis Date    CHF (congestive heart failure) ([address]     CVA (cerebral infarction)     History of verrucae (wart) excision     Hyperlipidemia     Hypertension     Leg pain     Obstructive apnea     Unspecified cerebral artery occlusion with cerebral infarction     Valvular disease      Past Surgical History:   Procedure Laterality Date    ABSCESS DRAINAGE      CARDIAC DEFIBRILLATOR PLACEMENT      DENTAL SURGERY      extraction of all teeth for dentures     Family History   Problem Relation Age of Onset    Cancer Mother     Stroke Father      Social History     Socioeconomic History    Marital status:      Spouse name: Not on file    Number of children: Not on file    Years of education: Not on file    Highest education level: Not on file   Occupational History    Not on file   Social Needs    Financial resource strain: Not on file    Food insecurity     Worry: Not on file     Inability: Not on file    Transportation needs     Medical: Not on file     Non-medical: Not on file   Tobacco Use    Smoking status: Former Smoker     Packs/day: 0.25     Years: 2.00     Pack years: 0.50     Last attempt to quit: 1981     Years since quittin.7    Smokeless tobacco: Never Used   Substance and Sexual Activity    Alcohol use: No    Drug use: No    Sexual activity: Never     Comment:     Lifestyle    Physical activity     Days per week: Not on file     Minutes per session: Not on file    Stress: Not on file   Relationships    Social connections     Talks on phone: Not on file     Gets together: Not on file     Attends Synagogue service: Not on file     Active member of club or organization: Not on file     Attends meetings of clubs or organizations: Not on file     Relationship status: Not on file    Intimate partner violence     Fear of current or ex partner: Not on file     Emotionally abused: Not on file     Physically abused: Not on file     Forced sexual activity: Not on file   Other Topics Concern    Not on file   Social History Narrative    Not on file       Review of Systems:   · Constitutional: there has been no unanticipated weight loss. There's been no change in energy level, sleep pattern, or activity level. · Eyes: No visual changes or diplopia. No scleral icterus. · ENT: No Headaches, hearing loss or vertigo. No mouth sores or sore throat. · Cardiovascular: Reviewed in HPI  · Respiratory: No cough or wheezing, no sputum production. No hematemesis. Mild SOB at times. · Gastrointestinal: No abdominal pain, appetite loss, blood in stools. No change in bowel or bladder habits. · Genitourinary: No dysuria, trouble voiding, or hematuria. · Musculoskeletal:  No gait disturbance, weakness or joint complaints. · Integumentary: No rash or pruritis. · Neurological: No headache, diplopia, change in muscle strength, numbness or tingling. No change in gait, balance, coordination, mood, affect, memory, mentation, behavior. · Psychiatric: No anxiety, no depression. · Endocrine: No malaise, fatigue or temperature intolerance. No excessive thirst, fluid intake, or urination. No tremor. · Hematologic/Lymphatic: No abnormal bruising or bleeding, blood clots or swollen lymph nodes. · Allergic/Immunologic: No nasal congestion or hives. Physical Examination:    Vitals:    06/23/20 1157   BP: 94/60   Pulse: 89   SpO2: 95%   Weight: 173 lb (78.5 kg)     Body mass index is 27.1 kg/m².      Wt Readings from Last 3 Encounters:   06/23/20 173 lb (78.5 kg)   03/17/20 167 lb (75.8 kg)   11/01/19 167 lb (75.8 kg)     BP Readings from Last 3 Encounters:   06/23/20 94/60   03/17/20 118/74   11/01/19 132/60        Constitutional and General Appearance: Warm and dry, no apparent distress, normal coloration  HEENT:  Normocephalic, atraumatic  Respiratory:  · Normal excursion and expansion without use of accessory muscles  · Resp Auscultation: Normal breath sounds without dullness  Cardiovascular:  · The apical impulses not displaced  · Heart tones are crisp and normal  · JVP normal  · Regular rate and rhythm, normal Tobacco Cessation Counseling: NA  2. Retake of BP if >140/90:   NA  3. Documentation to PCP/referring for new patient:  Sent to PCP at close of office visit  4. CAD patient on anti-platelet: NA  5. CAD patient on STATIN therapy:  Yes  6. Patient with CHF and aFib on anticoagulation:  Yes     I appreciate the opportunity of cooperating in the care of this individual.    Leonor Valdivia M.D., [address] [facility]    Kimo's attestation: This note was scribed in the presence of Dr. Albertina Nassar MD, by Kodak Schroeder RN. The scribe's documentation has been prepared under my direction and personally reviewed by me in its entirety. I confirm that the note above accurately reflects all work, treatment, procedures, and medical decision making performed by me.

## 2020-06-23 NOTE — PROGRESS NOTES
Clinical Outcomes:  Yes     No  []   [x]       Major bleeding event  []   [x]       Thromboembolic event    Duration of warfarin Therapy: indefinite  INR Range:  2.0-3.0     INR is 3.2 today after being 2.3 on Sun likely d/t diarrhea   Hold dose tomorrow then continue weekly dose of 1.25mg on Sun, Tues and Thurs and 2.5mg all other days. Encouraged to maintain a consistency of vegetables/salads.   Recheck INR in 3 weeks, 7/14    Referring cardiologist is Dr. Belkis Shrestha  INR (no units)   Date Value   06/23/2020 3.2   06/21/2020 2.30   05/18/2020 3.3   04/13/2020 3.00   03/23/2020 2.7   03/13/2020 1.6   09/24/2018 3.20 (H)   09/18/2018 2.8     CLINICAL PHARMACY CONSULT: MED RECONCILIATION/REVIEW ADDENDUM    For Pharmacy Admin Tracking Only    PHSO: No  Total # of Interventions Recommended: 1  - Decreased Dose #: 1  - Maintenance Safety Lab Monitoring #: 1  Total Interventions Accepted: 1  Time Spent (min): 15    Brooke Lam, BeccaD

## 2020-06-23 NOTE — TELEPHONE ENCOUNTER
Son asking for rx for double hospital bed . Patient was supposed to ask about this at appt today but forgot . Please call son to let him know if this can be done .

## 2020-06-23 NOTE — TELEPHONE ENCOUNTER
This usually comes from family physician. I have never ordered a hospital bed, so not sure how to do so. I suggest calling Dr. Jan Alejandro office.   [place]

## 2020-07-21 NOTE — PROGRESS NOTES
PT 35.4 / INR 2.9 Patient taking 1.25 mg on Tue/Sun and 2.5mg all other days.  Please call [address] [phone] with warfarin dosing and order for next INR check. Returned call to [facility]. Instructed for patient to continue same weekly dose of 1.25 mg on Sun, Tues and Thurs and 2.5 mg all other days of the week.  Recheck INR in 2 weeks, 8/4    CLINICAL PHARMACY CONSULT: MED RECONCILIATION/REVIEW ADDENDUM    For Pharmacy Admin Tracking Only    PHSO: No  Total # of Interventions Recommended: 0  - Maintenance Safety Lab Monitoring #: 1  Total Interventions Accepted: 0  Time Spent (min): 15    Tad Kc, PharmD

## 2020-08-04 NOTE — PROGRESS NOTES
PT 17.9 / INR 1.5 Patient took 1.25mg warfarin on Tue/Thur/Sun and 2.5mg all other days. [facility] [phone] with warfarin dosing and order for next INR check. Returned call to [facility]. Instructed for patient to take 1.25 mg on Sun and Thurs and 2.5 mg all other days of the week. Recheck INR in 1 week. 8/11.  Any questions or concerns return call to clinic, [address]: No  Total # of Interventions Recommended: 1  - Increased Dose #: 1  - Maintenance Safety Lab Monitoring #: 1  Total Interventions Accepted: 1  Time Spent (min): 15    Clint Sims, BeccaD

## 2020-08-11 NOTE — PROGRESS NOTES
We received remote transmission from patient's monitor at home. Transmission shows normal sensing and pacing function. Effective Bi-V pacing is low. Will have staff bring pt in for further evaluation. EP physician will review. See interrogation under cardiology tab in the [address] [place] [address] [address] field for more details. Optivol is within normal range.

## 2020-08-11 NOTE — PROGRESS NOTES
Emre Kang called with pts INR results of 1.8, PT of 21.5. Pt takes a 1/2 of a 2.5 mg tab on T/Th and 2.5 mgs ( whole tab) all other days. No medication or diet changes. Please verify dosing her phone was cutting out . [phone]. Called Maria Luisa back. Confirmed that patient took 1.25mg on Thurs and Sun only and 2.5mg AOD. Instructed to have patient take 3.75mg tonight then continue weekly dose of 1.25mg on Sun and Thurs and 2.5mg all other days since INR was 1.8 which is below goal range of 2-3. Will check INR again in 1 week, 8/18.       Thuan Villalta, BeccaD, [facility]    CLINICAL PHARMACY CONSULT: MED RECONCILIATION/REVIEW ADDENDUM    For Pharmacy Admin Tracking Only    PHSO: No  Total # of Interventions Recommended: 1  - Increased Dose #: 1  - Maintenance Safety Lab Monitoring #: 1  Total Interventions Accepted: 1  Time Spent (min): Via Jeanie Phillips PharmD

## 2020-08-25 NOTE — PROGRESS NOTES
PT 30.6 / INR 2.6 Patient took 2.5mg warfarin daily.  Please call [facility] [phone] with warfarin dosing and order for next INR check. Returned call to [facility]. After asking dose for patient, she states pt actually took 1.25 mg on Sun and 2.5 mg all other days of the week. Advised to continue 1.25 mg on Sun and 2.5 mg all other days of the week.  Recheck INR in 1 week, 9/1      CLINICAL PHARMACY CONSULT: YAO Crow Tracking Only    PHSO: No  Total # of Interventions Recommended: 0  - Maintenance Safety Lab Monitoring #: 1  Total Interventions Accepted: 0  Time Spent (min): 10    Kari Madrid, PharmD

## 2020-08-31 NOTE — PROGRESS NOTES
[facility]   Neurology followup    Subjective:   CC/HP  History was obtained from the patient. Interval history:  Patient has not had any further seizures since last office visit  Patient is not on Topamax anymore. Her migraines are stable. No side effects to  Depakote. Patient still has some tremors in the right hand. There is been no change since her last office visit. Denies any change in her gait. She has always had some gait difficulty because of her previous stroke. Details of her history:  Patient has known partial complex seizure. Her only seizure was about 9 years ago and has not had any further seizures since that time. Patient has had mild chronic left-sided weakness from an old stroke. Patient states that her depression seems to be much improved after we took her off the [address] and the started the patient on Depakote.   She has not had any further side effects or problems with the Depakote  Patient has obstructive sleep apnea syndrome and is using her CPAP machine      REVIEW OF SYSTEMS    Constitutional:  []   Chills   [x]  Fatigue   []  Fevers   []  Malaise   []  Weight loss     [] Denies all of the above    Respiratory:   []  Cough    [x]  Shortness of breath         [] Denies all of the above     Cardiovascular:   []  Chest pain    []  Exertional chest pressure/discomfort           [] Palpitations    []  Syncope     [x] Denies all of the above        Past Medical History:   Diagnosis Date    [facility]estive [facility] failure) ([facility] [address]     CVA (cerebral infarction)     History of verrucae (wart) excision     Hyperlipidemia     Hypertension     Leg pain     Obstructive apnea     Unspecified cerebral artery occlusion with cerebral infarction     Valvular disease      Family History   Problem Relation Age of Onset    Cancer Mother     Stroke Father      Social History     Socioeconomic History    Marital status:      Spouse name: None    Number of children: None    Years of education: None    Highest education level: None   Occupational History    None   Social Needs    Financial resource strain: None    Food insecurity     Worry: None     Inability: None    Transportation needs     Medical: None     Non-medical: None   Tobacco Use    Smoking status: Former Smoker     Packs/day: 0.25     Years: 2.00     Pack years: 0.50     Last attempt to quit: 1981     Years since quittin.9    Smokeless tobacco: Never Used   Substance and Sexual Activity    Alcohol use: No    Drug use: No    Sexual activity: Never     Comment:    Lifestyle    Physical activity     Days per week: None     Minutes per session: None    Stress: None   Relationships    Social connections     Talks on phone: None     Gets together: None     Attends Restoration service: None     Active member of club or organization: None     Attends meetings of clubs or organizations: None     Relationship status: None    Intimate partner violence     Fear of current or ex partner: None     Emotionally abused: None     Physically abused: None     Forced sexual activity: None   Other Topics Concern    None   Social History Narrative    None        Objective:  Exam:  Ht 5' 7\" (1.702 m)   Wt 170 lb (77.1 kg)   BMI 26.63 kg/m²   This is a well-nourished patient in no acute distress  Patient is awake, alert and oriented x3. Speech is normal.  Pupils are equal round reacting to light. Extraocular movements intact. Face symmetrical. Tongue midline. Motor exam shows mild left-sided weakness Patient has cogwheel rigidity and some pill-rolling tremors in the right hand. Deep tendon reflexes normal. Plantar reflexes downgoing. Sensory exam normal. Coordination normal. Gait slightly unsteady. No carotid bruit. No neck stiffness.     Data :  LABS:  General Labs:    CBC:   Lab Results   Component Value Date    WBC 7.3 2020    RBC 4.43 2020    HGB 15.1 2020    HCT 44.1 2020    MCV 99.5 05/05/2020    MCH 34.0 05/05/2020    MCHC 34.2 05/05/2020    RDW 13.2 05/05/2020     05/05/2020    MPV 8.0 05/05/2020     CMP:    Lab Results   Component Value Date     05/05/2020    K 4.9 05/05/2020     05/05/2020    CO2 23 05/05/2020    BUN 15 05/05/2020    CREATININE 0.9 05/05/2020    GFRAA >60 05/05/2020    GFRAA >60 04/30/2013    AGRATIO 1.4 05/05/2020    LABGLOM >60 05/05/2020    GLUCOSE 94 05/05/2020    PROT 7.3 05/05/2020    PROT 7.4 10/11/2012    LABALBU 4.2 05/05/2020    CALCIUM 10.0 05/05/2020    BILITOT 0.4 05/05/2020    ALKPHOS 59 05/05/2020    AST 21 05/05/2020    ALT 18 05/05/2020     TSH:    Lab Results   Component Value Date    TSH 1.53 10/16/2013     Impression :  Partial complex seizures, stable  Depression, stable  Mild left-sided weakness from old stroke  Obstructive sleep apnea, on CPAP  Fatigue, improved  Migraine headaches, doing very well and patient is not on any Topamax at this time  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 No change since last office visit    Plan :  Discussed with patient  Continue same dose of Depakote  Side effects were discussed. We discussed about treatment for the parkinsonian tremors. Since the symptoms are mild at this time, we decided to wait. I will see her back in 6 months        Please note a portion of  this chart was generated using dragon dictation software. Although every effort was made to ensure the accuracy of this automated transcription, some errors in transcription may have occurred.

## 2020-09-08 NOTE — PROGRESS NOTES
PT 23.3 / INR 1.9 Patient took 1.25mg warfarin on Sunday and 2.5mg all other days. [facility] call Jonny Lima [phone] with warfarin dosing and order for next INR check. Called Ayleen back. Instructed to have patient take 3.75mg tonight then continue taking 1.25mg on Sun and 2.5mg all other days since INR was 1.9 which is below goal range of 2-3. Will check INR again in 1 week, 9/15.       Makayla Beltran, BeccaD, [facility]    CLINICAL PHARMACY CONSULT: MED RECONCILIATION/REVIEW ADDENDUM    For Pharmacy Admin Tracking Only    PHSO: No  Total # of Interventions Recommended: 1  - Increased Dose #: 1  - Maintenance Safety Lab Monitoring #: 1  Total Interventions Accepted: 1  Time Spent (min): [address], PharmD

## 2020-09-14 NOTE — LETTER
[address] Cardiology [place] [address] [address] [address]  Phone: [phone]  Fax: [phone]    Pratik Vazquez MD        September 14, 2020     Patient: Grupo Day   YOB: 1945   Date of Visit: 9/14/2020       To Whom It May Concern: It is my medical opinion that Ana Knows, may receive the Cardiac   Meals on Wheels. If you have any questions or concerns, please don't hesitate to call.     Sincerely,        Pratik Vazquez MD

## 2020-09-15 NOTE — PROGRESS NOTES
PT 3.1 / INR 37.4 Patient took 3.75mg warfarin on Tues, 1.25mg on Sun and 2.5mg on Mon/Wed Thur/Fri/Sat.  Please call Jimena Warner @ [phone] with warfarin dosing and order for next INR check. Called Ayleen back. Instructed to have patient continue taking 1.25mg on Sun and 2.5mg all other days since INR was 3.1 which is above goal range of 2-3. Will check INR again in 1 week, 9/22.       Elma Gaspar, BeccaD, [facility]    CLINICAL PHARMACY CONSULT: MED RECONCILIATION/REVIEW ADDENDUM    For Pharmacy Admin Tracking Only    PHSO: No  Total # of Interventions Recommended: 0  - Maintenance Safety Lab Monitoring #: 1  Total Interventions Accepted: 0  Time Spent (min): [address], PharmD

## 2020-09-22 NOTE — PROGRESS NOTES
Results from [place] [address] visit today, INR of 4.0 , warfarin dose of 1.25 mgs of warfarin on Sunday and 2.5 mgs all other days. No med or diet changes. Please call Noemy Johns at [facility]. Instructed to have patient take 1.25mg tonight then continue home dose of 1.25mg Sunday and 2.5mg all other days. Patient may need a dose decrease if next week's INR comes back elevated. Will check INR again in 1 week, 9/29.       Clementine Cheema, BeccaD    CLINICAL PHARMACY CONSULT: MED RECONCILIATION/REVIEW ADDENDUM    For Pharmacy Admin Tracking Only    PHSO: No  Total # of Interventions Recommended: 1  - Decreased Dose #: 1  - Maintenance Safety Lab Monitoring #: 1  Total Interventions Accepted: 1  Time Spent (min): 55 A. Isreal Ramirez, PharmD

## 2020-10-01 PROBLEM — R79.1 ELEVATED INR: Status: ACTIVE | Noted: 2020-01-01

## 2020-10-01 NOTE — ED PROVIDER NOTES
[address] [facility]  eMERGENCY dEPARTMENTeNCOUnter      Pt Name: Wellington Montana  MRN: [id number]  Zac[place]fbill 1945  Date of evaluation: 9/30/2020  Provider: Adriana Nuno MD    CHIEF COMPLAINT       Chief Complaint   Patient presents with    Fall     Pt fell on sunday and fell today with lac to left eye, pt with weakness to left side due to stroke, pt states that she doesn't remember what happened. son states that her INR was drawn today and was 15. Pt with vomiting on monday or tuesday unsure of which day. pt axo x 4          HISTORY OF PRESENT ILLNESS   (Location/Symptom, Timing/Onset,Context/Setting, Quality, Duration, Modifying Factors, Severity)  Note limiting factors. Wellington Montana is a 66 y.o. female who presents to the emergency department for a fall this evening. The patient reports that she has been falling frequently of late. She has a history of CVA about 12 years ago with residual left-sided weakness but over the past few months she has been falling. She last fell on Sunday and then again today. Her son brought her in because he was concerned as she is anticoagulated and her INR today was 13. He is also concerned because she lives alone and with the frequent falls he does not believe that she is able to take care of herself at home safely. The patient denies any headache no changes in her vision or speech no chest pain or difficulty breathing she has been feeling nauseated and vomited once since yesterday. No abdominal pain no diarrhea she is incontinent of urine but has not had had any dysuria. Nursing notes were reviewed. REVIEW OF SYSTEMS    (2-9 systems for level 4, 10 or more for level 5)     Review of Systems    Positive and pertinent negative as per HPI. Except as noted above in the ROS, all other systems were reviewed and were negative.     PAST MEDICAL HISTORY     Past Medical History:   Diagnosis Date    CHF (congestive heart failure) ([facility] [address]     CVA (cerebral infarction)     History of verrucae (wart) excision     Hyperlipidemia     Hypertension     Leg pain     Obstructive apnea     Unspecified cerebral artery occlusion with cerebral infarction     Valvular disease          SURGICALHISTORY       Past Surgical History:   Procedure Laterality Date    ABSCESS DRAINAGE      CARDIAC DEFIBRILLATOR PLACEMENT      DENTAL SURGERY      extraction of all teeth for dentures         CURRENT MEDICATIONS       Previous Medications    ASPIRIN 81 MG EC TABLET    Take 81 mg by mouth daily. CARVEDILOL (COREG) 25 MG TABLET    TAKE ONE-HALF (1/2) TABLET (12.5 MG) IN THE MORNING AND 1 TABLET (25 MG) IN THE EVENING    DIGOXIN (LANOXIN) 125 MCG TABLET    TAKE 1 TABLET DAILY    DIVALPROEX (DEPAKOTE ER) 500 MG EXTENDED RELEASE TABLET    TAKE 1 TABLET DAILY    HYDROCHLOROTHIAZIDE (HYDRODIURIL) 25 MG TABLET    Take 1 tablet by mouth every morning    SACUBITRIL-VALSARTAN (ENTRESTO) 24-26 MG PER TABLET    TAKE 1/2 TABLET BY MOUTH TWICE DAILY    SIMVASTATIN (ZOCOR) 20 MG TABLET    TAKE 1 TABLET NIGHTLY    VENLAFAXINE (EFFEXOR) 37.5 MG TABLET    TAKE 1 TABLET EVERY EVENING    WARFARIN (COUMADIN) 2.5 MG TABLET    Take 2.5 mg by mouth daily Dose Adjusted via [facility]    ZOLPIDEM (AMBIEN) 10 MG TABLET    Take 10 mg by mouth nightly as needed.        ALLERGIES     Seasonal    FAMILY HISTORY       Family History   Problem Relation Age of Onset    Cancer Mother     Stroke Father           SOCIAL HISTORY       Social History     Socioeconomic History    Marital status:      Spouse name: None    Number of children: None    Years of education: None    Highest education level: None   Occupational History    None   Social Needs    Financial resource strain: None    Food insecurity     Worry: None     Inability: None    Transportation needs     Medical: None     Non-medical: None   Tobacco Use    Smoking status: Former Smoker Packs/day: 0.25     Years: 2.00     Pack years: 0.50     Last attempt to quit: 1981     Years since quittin.0    Smokeless tobacco: Never Used   Substance and Sexual Activity    Alcohol use: No    Drug use: No    Sexual activity: Never     Comment:    Lifestyle    Physical activity     Days per week: None     Minutes per session: None    Stress: None   Relationships    Social connections     Talks on phone: None     Gets together: None     Attends Cheondoism service: None     Active member of club or organization: None     Attends meetings of clubs or organizations: None     Relationship status: None    Intimate partner violence     Fear of current or ex partner: None     Emotionally abused: None     Physically abused: None     Forced sexual activity: None   Other Topics Concern    None   Social History Narrative    None       SCREENINGS             PHYSICAL EXAM    (up to 7 for level 4, 8 or more for level 5)     ED Triage Vitals   BP Temp Temp Source Pulse Resp SpO2 Height Weight   20 2303 20 2303 20 2303 20 2303 20 2303 20 2304 20 2304 20 2304   100/79 98.3 °F (36.8 °C) Oral 94 18 95 % 5' 7\" (1.702 m) 175 lb (79.4 kg)       Physical Exam  Vitals signs and nursing note reviewed. Constitutional:       Appearance: Normal appearance. She is well-developed. She is not ill-appearing. Comments: Pleasant elderly female in no acute distress   HENT:      Head: Normocephalic and atraumatic. Right Ear: External ear normal.      Left Ear: External ear normal.      Nose: Nose normal.   Eyes:      General: No scleral icterus. Right eye: No discharge. Left eye: No discharge. Extraocular Movements: Extraocular movements intact. Conjunctiva/sclera: Conjunctivae normal.      Pupils: Pupils are equal, round, and reactive to light. Neck:      Musculoskeletal: Neck supple.    Cardiovascular:      Rate and Rhythm: Normal rate and regular rhythm. Pulmonary:      Effort: Pulmonary effort is normal. No respiratory distress. Breath sounds: Normal breath sounds. Abdominal:      General: Bowel sounds are normal. There is no distension. Palpations: Abdomen is soft. Tenderness: There is no abdominal tenderness. Musculoskeletal:         General: No swelling, tenderness, deformity or signs of injury. Skin:     Coloration: Skin is not pale. Comments: Small superficial laceration just above the left eye laterally. It is closed. Fungal rash beneath the right breast.  Ecchymosis of the left hand dorsally. Neurological:      Mental Status: She is alert. Psychiatric:         Mood and Affect: Mood normal.         Behavior: Behavior normal.             DIAGNOSTIC RESULTS     EKG: All EKG's are interpreted by the Emergency Department Physician who either signs or Co-signs this chart in the absence of a cardiologist.    12 lead EKG shows electronically paced rhythm at a rate of 90 bpm, widened QRS and prolonged QT. No acute ischemic changes. RADIOLOGY:   Non-plain film images such as CT, Ultrasound and MRI are read by the radiologist. Plain radiographic images are visualized and preliminarily interpreted by the emergency physician with the below findings:      Interpretation per the Radiologist below, if available at the time of this note:    XR CHEST PORTABLE   Final Result   Perihilar atelectasis with elevated right hemidiaphragm. CT CERVICAL SPINE WO CONTRAST   Final Result   Head CT:      Somewhat degraded study by the patient's motion. No definite acute   intracranial finding within the limitations of the study. Cervical spine:      No acute fracture or subluxation. CT HEAD WO CONTRAST   Final Result   Head CT:      Somewhat degraded study by the patient's motion. No definite acute   intracranial finding within the limitations of the study.       Cervical spine:      No acute fracture or subluxation. ED BEDSIDE ULTRASOUND:   Performed by ED Physician - none    LABS:  Labs Reviewed   URINE RT REFLEX TO CULTURE - Abnormal; Notable for the following components:       Result Value    Bilirubin Urine SMALL (*)     Ketones, Urine 15 (*)     Blood, Urine LARGE (*)     Protein, UA TRACE (*)     All other components within normal limits    Narrative:     Performed at:  [facility] [address], [address]   Phone [phone]   PROTIME-INR - Abnormal; Notable for the following components:    Protime 147.1 (*)     INR 12.36 (*)     All other components within normal limits    Narrative:     CALL  [facility]  [facility] tel. [phone],  Coag results called to and read back by PHYLICIA Evans, 10/01/2020 02:59,  by Steffanie Ragsdale  Performed at:  [facility] [address]   Phone [phone]   BASIC METABOLIC PANEL W/ REFLEX TO MG FOR LOW K - Abnormal; Notable for the following components:    Sodium 133 (*)     CO2 18 (*)     Glucose 108 (*)     All other components within normal limits    Narrative:     Performed at:  [facility] [address], [place]   Phone [phone]   CBC WITH AUTO DIFFERENTIAL - Abnormal; Notable for the following components:    WBC 13.3 (*)     All other components within normal limits    Narrative:     Performed at:  [facility] [address], [place]   Phone [phone]   MICROSCOPIC URINALYSIS - Abnormal; Notable for the following components:    RBC, UA 21-50 (*)     Crystals, UA Few Ca.  Oxalate (*)     All other components within normal limits    Narrative:     Performed at:  [facility] [address] [place], [place]   Phone [phone]   TROPONIN    Narrative:     Performed at:  [facility]  [address], [address]   Phone [phone]   TYPE AND SCREEN       All other labs were within normal range or not returned as of this dictation. EMERGENCY DEPARTMENT COURSE and DIFFERENTIAL DIAGNOSIS/MDM:   Vitals:    Vitals:    09/30/20 2303 09/30/20 2304 10/01/20 0115 10/01/20 0230   BP: 100/79  118/75 (!) 121/55   Pulse: 94   86   Resp: 18  16 16   Temp: 98.3 °F (36.8 °C)      TempSrc: Oral      SpO2:  95% 94% 95%   Weight:  175 lb (79.4 kg)     Height:  5' 7\" (1.702 m)         Elderly female with frequent falls. Basic laboratory studies ordered as well as a chest x-ray and CT cervical spine, CT head. No acute process in the spine or head. There is no acute process in the chest.  Laboratory studies showed elevated INR and mild elevation in her white count. No obvious source of infection. Patient is given oral vitamin K.  I do believe it is in her best interest to be admitted to the hospital.  Her son is concerned about her safety with her frequent falls and the fact that she lives alone at home. The patient agrees with the plan for admission. The admitting provider is contacted who has agreed to admit this patient to his service. CRITICAL CARE TIME   None       CONSULTS:  IP CONSULT TO INTERNAL MEDICINE    PROCEDURES:  Unless otherwise noted above, none     Procedures    FINAL IMPRESSION      1. Fall, initial encounter    2. General weakness    3. Supratherapeutic INR          DISPOSITION/PLAN   DISPOSITION Decision To Admit 10/01/2020 03:03:41 AM      PATIENT REFERREDTO:  No follow-up provider specified.     DISCHARGEMEDICATIONS:  New Prescriptions    No medications on file          (Please note that portions of this note were completed with a voice recognition program.  Efforts were made to edit the dictations but occasionally words are mis-transcribed.)    Abhijit Tsang MD (electronically signed)  Attending Emergency Physician       Abhijit Tsang MD  10/01/20 5818 Chikis Renteria MD  10/01/20 [id number]

## 2020-10-01 NOTE — H&P
History and Physical  Dr. Chelsi Hernandez  10/1/2020    PCP: Leigh Regan MD    Cc:   Chief Complaint   Patient presents with    Fall     Pt fell on sunday and fell today with lac to left eye, pt with weakness to left side due to stroke, pt states that she doesn't remember what happened. son states that her INR was drawn today and was 15. Pt with vomiting on monday or tuesday unsure of which day. pt axo x 4        HPI:  Shara Esquivel is a 66 y.o. female who has a past medical history of CHF (congestive heart failure) ([facility] [address] CVA (cerebral infarction), History of verrucae (wart) excision, Hyperlipidemia, Hypertension, Leg pain, Obstructive apnea, Unspecified cerebral artery occlusion with cerebral infarction, and Valvular disease. Patient presents with Elevated INR. HPI     66 y.o. female who presents to the emergency department for a fall this evening. The patient reports that she has been falling frequently of late. She has a history of CVA about 12 years ago with residual left-sided weakness but over the past few months she has been falling. She last fell on Sunday and then again today. Her son brought her in because he was concerned as she is anticoagulated and her INR today was 13. He is also concerned because she lives alone and with the frequent falls he does not believe that she is able to take care of herself at home safely. The patient denies any headache no changes in her vision or speech no chest pain or difficulty breathing she has been feeling nauseated    Hard to get much more hx from patient as her responses seem to change with each question. Problem list of hospitalization thus far:   Active Hospital Problems    Diagnosis    Elevated INR [R79.1]    Hyperlipidemia [E78.5]    Hemiparesis affecting left side as late effect of cerebrovascular accident ([facility] [address] [I69.354]    Depression [F32.9]    Atrial fibrillation [I48.91]         Review of Systems: (1 system for EPF, 2-9 for detailed, 10+ for comprehensive)      Code Status: Full Code    [facility]  Prior to Admission medications    Medication Sig Start Date End Date Taking? Authorizing Provider   divalproex (DEPAKOTE ER) 500 MG extended release tablet TAKE 1 TABLET DAILY 8/31/20  Yes Natalie Bernheim, MD   warfarin (COUMADIN) 2.5 MG tablet Take 2.5 mg by mouth daily Dose Adjusted via [facility]   Yes Historical Provider, MD   simvastatin (ZOCOR) 20 MG tablet TAKE 1 TABLET NIGHTLY 4/17/20  Yes Luis Summers MD   carvedilol (COREG) 25 MG tablet TAKE ONE-HALF (1/2) TABLET (12.5 MG) IN THE MORNING AND 1 TABLET (25 MG) IN THE EVENING 3/23/20  Yes Luis Summers MD   digoxin (LANOXIN) 125 MCG tablet TAKE 1 TABLET DAILY 11/25/19  Yes Luis Summers MD   sacubitril-valsartan (ENTRESTO) 24-26 MG per tablet TAKE 1/2 TABLET BY MOUTH TWICE DAILY 11/1/19  Yes Luis Summers MD   venlafaxine (EFFEXOR) 37.5 MG tablet TAKE 1 TABLET EVERY EVENING  Patient taking differently: Take 37.5 mg by mouth daily  9/9/19  Yes Luis Summers MD   hydrochlorothiazide (HYDRODIURIL) 25 MG tablet Take 1 tablet by mouth every morning 4/11/19  Yes Luis Summers MD   zolpidem (AMBIEN) 10 MG tablet Take 10 mg by mouth nightly as needed.    Yes Historical Provider, MD         Allergies   Allergen Reactions    Seasonal              EXAM: (2-7 system for EPF/Detailed, ?8 for Comprehensive)  /78   Pulse 88   Temp 98.1 °F (36.7 °C) (Oral)   Resp 16   Ht 5' 7\" (1.702 m)   Wt 171 lb 1.2 oz (77.6 kg)   SpO2 96%   BMI 26.79 kg/m²   Constitutional: vitals as above: alert, appears stated age and cooperative  Head: Normocephalic, without obvious abnormality, atraumatic  Eyes:lids and lashes normal  EMNT: nares midline, lips normal  Neck: no adenopathy, supple, symmetrical, trachea midline and thyroid not enlarged, symmetric, no tenderness/mass/nodules   Respiratory: clear to auscultation and percussion bilaterally with normal respiratory effort  Cardiovascular: normal rate, regular rhythm, normal S1 and S2 and no gallops  Gastrointestinal: soft, non-tender, non-distended, normal bowel sounds, no masses or organomegaly  Lymphatic:   Extremities: no edema, no clubbins  Skin:multiple bruises  Neurologic:negative    LABS:  Labs Reviewed   URINE RT REFLEX TO CULTURE - Abnormal; Notable for the following components:       Result Value    Bilirubin Urine SMALL (*)     Ketones, Urine 15 (*)     Blood, Urine LARGE (*)     Protein, UA TRACE (*)     All other components within normal limits    Narrative:     Performed at:  [facility] [address], [place], [facility] [employer]   Phone [phone]   PROTIME-INR - Abnormal; Notable for the following components:    Protime 147.1 (*)     INR 12.36 (*)     All other components within normal limits    Narrative:     CALL  [facility] tel. [phone],  Coag results called to and read back by RN Sudie Bence, 10/01/2020 02:59,  by Ike Ramon  Performed at:  [facility] [address], [place], [facility] [employer]   Phone [phone]   BASIC METABOLIC PANEL W/ REFLEX TO MG FOR LOW K - Abnormal; Notable for the following components:    Sodium 133 (*)     CO2 18 (*)     Glucose 108 (*)     All other components within normal limits    Narrative:     Performed at:  [facility] [address], [place], [facility]   Phone [phone]   CBC WITH AUTO DIFFERENTIAL - Abnormal; Notable for the following components:    WBC 13.3 (*)     Neutrophils Absolute 9.2 (*)     Polychromasia Occasional (*)     All other components within normal limits    Narrative:     Performed at:  [facility] [address], [place], [facility]   Phone [phone]   MICROSCOPIC URINALYSIS - Abnormal; Notable for the following components:    RBC, UA 21-50 (*)     Crystals, UA Few Ca. Oxalate (*)     All other components within normal limits    Narrative:     Performed at:  [facility] [address], [facility], [address] [employer]   Phone [phone]   PROTIME-INR - Abnormal; Notable for the following components:    Protime 121.4 (*)     INR 10.22 (*)     All other components within normal limits    Narrative:     [facility] tel. [phone],  Coag results called to and read back by Sheran Schirmer, 10/01/2020 06:25,  by [place]  Performed at:  [facility] [address], [facility], [address]   Phone [phone]   TROPONIN    Narrative:     Performed at:  [facility] [address], [facility], [address] [employer]   Phone [phone]   TYPE AND SCREEN    Narrative:     Performed at:  [facility] [address], [facility], [address]   Phone [phone]         IMAGING:  Imaging results from the ER have been reviewed in the computerized chart. Ct Head Wo Contrast    Result Date: 9/30/2020  EXAMINATION: CT OF THE HEAD WITHOUT CONTRAST  9/30/2020 11:28 pm TECHNIQUE: CT of the head and cervical spine was performed without the administration of intravenous contrast. Dose modulation, iterative reconstruction, and/or weight based adjustment of the mA/kV was utilized to reduce the radiation dose to as low as reasonably achievable.; CT of the cervical spine was performed without the administration of intravenous contrast. Multiplanar reformatted images are provided for review. Dose modulation, iterative reconstruction, and/or weight based adjustment of the mA/kV was utilized to reduce the radiation dose to as low as reasonably achievable. COMPARISON: None.  HISTORY: ORDERING SYSTEM PROVIDED HISTORY: fall, inr 13 TECHNOLOGIST PROVIDED HISTORY: If patient is on cardiac monitor and/or pulse ox, they may be taken off cardiac monitor and pulse ox, left on O2 if currently on. All monitors reattached when patient returns to room. Has a \"code stroke\" or \"stroke alert\" been called? ->No Reason for exam:->fall, inr 13 Reason for Exam: fall, inr 13. Acuity: Acute Type of Exam: Initial FINDINGS: Head: The study is somewhat degraded by the patient's motion. BRAIN/VENTRICLES: There is no definite acute intracranial hemorrhage, mass effect or midline shift. Large area of encephalomalacia involving the right frontal temporoparietal lobes, MCA territory. No abnormal extra-axial fluid collection. The gray-white differentiation is maintained without evidence of an acute infarct. There is no evidence of hydrocephalus. ORBITS: The visualized portion of the orbits demonstrate no acute abnormality. SINUSES: The visualized paranasal sinuses and mastoid air cells demonstrate no acute abnormality. SOFT TISSUES/SKULL:  No acute abnormality of the visualized skull or soft tissues. Cervical spine: BONES/ALIGNMENT: There is no acute fracture or traumatic malalignment. DEGENERATIVE CHANGES: Mild to moderate multilevel cervical spondylosis, most prominent C5-C6. SOFT TISSUES: Unremarkable prevertebral soft tissues. Ill-defined thyroid nodules noted measuring up to 1.1 cm in the right lobe. Vascular calcification is seen. No apical pneumothorax. Head CT: Somewhat degraded study by the patient's motion. No definite acute intracranial finding within the limitations of the study. Cervical spine: No acute fracture or subluxation.      Ct Cervical Spine Wo Contrast    Result Date: 9/30/2020  EXAMINATION: CT OF THE HEAD WITHOUT CONTRAST  9/30/2020 11:28 pm TECHNIQUE: CT of the head and cervical spine was performed without the administration of intravenous contrast. Dose modulation, iterative reconstruction, and/or weight based adjustment of the mA/kV was utilized to reduce the radiation dose to as low as reasonably achievable.; CT of the cervical spine was performed without the administration of intravenous contrast. Multiplanar reformatted images are provided for review. Dose modulation, iterative reconstruction, and/or weight based adjustment of the mA/kV was utilized to reduce the radiation dose to as low as reasonably achievable. COMPARISON: None. HISTORY: ORDERING SYSTEM PROVIDED HISTORY: fall, inr 13 TECHNOLOGIST PROVIDED HISTORY: If patient is on cardiac monitor and/or pulse ox, they may be taken off cardiac monitor and pulse ox, left on O2 if currently on. All monitors reattached when patient returns to room. Has a \"code stroke\" or \"stroke alert\" been called? ->No Reason for exam:->fall, inr 13 Reason for Exam: fall, inr 13. Acuity: Acute Type of Exam: Initial FINDINGS: Head: The study is somewhat degraded by the patient's motion. BRAIN/VENTRICLES: There is no definite acute intracranial hemorrhage, mass effect or midline shift. Large area of encephalomalacia involving the right frontal temporoparietal lobes, MCA territory. No abnormal extra-axial fluid collection. The gray-white differentiation is maintained without evidence of an acute infarct. There is no evidence of hydrocephalus. ORBITS: The visualized portion of the orbits demonstrate no acute abnormality. SINUSES: The visualized paranasal sinuses and mastoid air cells demonstrate no acute abnormality. SOFT TISSUES/SKULL:  No acute abnormality of the visualized skull or soft tissues. Cervical spine: BONES/ALIGNMENT: There is no acute fracture or traumatic malalignment. DEGENERATIVE CHANGES: Mild to moderate multilevel cervical spondylosis, most prominent C5-C6. SOFT TISSUES: Unremarkable prevertebral soft tissues. Ill-defined thyroid nodules noted measuring up to 1.1 cm in the right lobe. Vascular calcification is seen. No apical pneumothorax. Head CT: Somewhat degraded study by the patient's motion. No definite acute intracranial finding within the limitations of the study. Cervical spine: No acute fracture or subluxation.      Carolyn Meter Chest Portable    Result Date: 10/1/2020  EXAMINATION: ONE XRAY VIEW OF THE CHEST 10/1/2020 1:19 am COMPARISON: 05/18/2009 HISTORY: ORDERING SYSTEM PROVIDED HISTORY: right sided chest pain TECHNOLOGIST PROVIDED HISTORY: Reason for exam:->right sided chest pain Reason for Exam: right sided chest pain Acuity: Acute Type of Exam: Initial Mechanism of Injury: patient fell Relevant Medical/Surgical History: previous CHF and hypertension FINDINGS: There is a dual lead ICD on the left. Cardiac size at the upper limits of normal.  COPD. Elevated right hemidiaphragm. Calcified lymph nodes related to remote granulomatous process. No pneumothorax is identified. No acute osseous abnormality is identified. Perihilar atelectatic change. Perihilar atelectasis with elevated right hemidiaphragm. EKG: from ER, interpreted by self, VPaced, rate 90. No acute st elevation. MEDICAL DECISION MAKING:    Principal Problem:    Elevated INR -New Problem to me. Pt with INR>13. Unclear if pt able to take meds as directed. She is quite confused. Hx of head trauma, but CT Head reviweed, no evidence of bleed  Plan: admit. Vit K to be given. Repeat INR ordered  Active Problems:    Atrial fibrillation -Established problem. Stable. Plan: on coumadin but supratheraputic. She is apparently a high fall risk as she has fallen at home    Depression  Plan: cont home meds    Hemiparesis affecting left side as late effect of cerebrovascular accident [facility]) -Established problem. Stable. Plan: Continue present orders/plan. Hyperlipidemia -Established problem. Stable. Plan: on statin, to continue. Monitor for myalgias          Diagnoses as listed above, designated as new or established and plan outlined for each. Data Reviewed:   (1) Lab tests were reviewed or ordered. (1) Radiology tests were reviewed or ordered. (1) Medical test (Echo, EKG, PFT/jackson) were ordered.   (1)History was obtained from someone other than patient  (1) Old records  were reviewed - see HPI/MDM for pertinent details if review done. (2) Case wasdiscussed with another health care provider: Dr Syed Colon  (2) Imaging was viewed by myself. (2) EKG  was viewed by myself. The patient isbeing placed in inpatient status with the expectation of requiring a hospital stay spanning at least two midnights for care and treatment of the problems noted in the problem list.  This determination is also based on thepatients comorbidities and past medical history, the severity and timing of the signs and symptoms upon presentation.         Electronically signed by: Yobany Schmitz 10/1/2020

## 2020-10-01 NOTE — PROGRESS NOTES
Shift assessment completed, see doc flowsheets. Patient awake, alert and oriented. Bruising with little cut that appears closed to L eye. L arm flaccid, Lungs clear, diminished. Abdomen softwith BS. Purwick in place and draining clear, yellow urine. Wanting to go home. Plan of care discussed for today and patient verbalizes understanding of plan of care. Will continue to monitor.

## 2020-10-01 NOTE — PROGRESS NOTES
Admission assessment complete. VSS. Pt c/o headache and nausea. Pt incontinent urine, Purewick applied per pt request, \"I'm thinking of getting that at home\". Pt A/O, aware of POC. Pt states wanting to fill out DNR paperwork and states being depressed lately since sister passed 1 month ago. Pt states she does not think Effexor is working. Call light within reach, non skid socks on, bed and posey alarm engaged.

## 2020-10-01 NOTE — PROGRESS NOTES
[facility]    Orders received for PT/OT evaluation. Chart review. Patient with significantly elevated INR (10.22) and with frequent falls prior to admission. Will hold therapy at this time and will follow up with pt tomorrow as medically appropriate.  Thanks, Charli Chan, PT, DPT [id number], Fang Miller, OTR/L [id number]

## 2020-10-02 NOTE — PROGRESS NOTES
Sitting up in the chair eating breakfast, pt is alert and oriented and denies pain or other needs at this time. Assessment done, VSS, call light in reach and chair alarm in place, will continue to monitor.

## 2020-10-02 NOTE — PLAN OF CARE
Problem: Pain:  Goal: Pain level will decrease  Description: Pain level will decrease  10/1/2020 2159 by Ana Barnard RN  Outcome: Ongoing  10/1/2020 1232 by Angus Billy RN  Outcome: Ongoing  Goal: Control of acute pain  Description: Control of acute pain  10/1/2020 1232 by Angus Billy RN  Outcome: Ongoing  Goal: Control of chronic pain  Description: Control of chronic pain  10/1/2020 1232 by Angus Billy RN  Outcome: Ongoing     Problem: Falls - Risk of:  Goal: Will remain free from falls  Description: Will remain free from falls  10/1/2020 2159 by Ana Barnard RN  Outcome: Ongoing  10/1/2020 1232 by Angus Billy RN  Outcome: Ongoing  Goal: Absence of physical injury  Description: Absence of physical injury  10/1/2020 1232 by Angus Billy RN  Outcome: Ongoing     Problem: Skin Integrity:  Goal: Will show no infection signs and symptoms  Description: Will show no infection signs and symptoms  10/1/2020 2159 by Ana Barnard RN  Outcome: Ongoing  10/1/2020 1232 by Angus Billy RN  Outcome: Ongoing  Goal: Absence of new skin breakdown  Description: Absence of new skin breakdown  10/1/2020 2159 by Ana Barnard RN  Outcome: Ongoing  10/1/2020 1232 by Angus Billy RN  Outcome: Ongoing

## 2020-10-02 NOTE — PROGRESS NOTES
Occupational Therapy   Occupational Therapy Initial Assessment  Date: 10/2/2020   Patient Name: August Cardoso  MRN: [id number]     : 1945    Date of Service: 10/2/2020    Discharge Recommendations:       August Cardoso scored a 15/24 on the AM-PAC ADL Inpatient form. Current research shows that an AM-PAC score of 17 or less is typically not associated with a discharge to the patient's home setting. Based on the patient's AM-PAC score and their current ADL deficits, it is recommended that the patient have 3-5 sessions per week of Occupational Therapy at d/c to increase the patient's independence. Please see assessment section for further patient specific details. If patient discharges prior to next session this note will serve as a discharge summary. Please see below for the latest assessment towards goals. Assessment   Performance deficits / Impairments: Decreased functional mobility ; Decreased ADL status; Decreased high-level IADLs;Decreased safe awareness;Decreased balance;Decreased endurance;Decreased cognition  Treatment Diagnosis: decreased activity tolerance due to late affects of left hemiplegia  Prognosis: Good  Decision Making: Low Complexity  Assistance / Modification: rw  OT Education: OT Role;ADL Adaptive Strategies;Transfer Training;Plan of Care  Patient Education: OT eval, plan of care, discharge planning  Barriers to Learning: decreased self awareness  REQUIRES OT FOLLOW UP: Yes  Activity Tolerance  Activity Tolerance: Patient limited by fatigue  Safety Devices  Safety Devices in place: Yes  Type of devices: All fall risk precautions in place;Call light within reach; Bed alarm in place;Nurse notified; Left in bed           Patient Diagnosis(es): The primary encounter diagnosis was Fall, initial encounter. Diagnoses of General weakness and Supratherapeutic INR were also pertinent to this visit.      has a past medical history of CHF (congestive heart failure) ([facility] [address] CVA (cerebral Perceptual Status: Impaired  Unilateral Attention: Cues to attend to left side of body;Cues to maintain midline in standing;Cues to attend left visual field  Initiation: Cues to initiate tasks     Sensation  Overall Sensation Status: Impaired(decreased sensation LUE/LLE, pt denied sensory deficits but appeared to have decreased proprioception)        LUE PROM (degrees)  LUE PROM: WFL  Left Hand AROM (degrees)  Left Hand AROM: WNL                      Plan   Plan  Times per week: 3-5  Times per day: Daily  Current Treatment Recommendations: Self-Care / ADL, Safety Education & Training, Patient/Caregiver Education & Training, Balance Training, Functional Mobility Training, Cognitive Reorientation, Endurance Training    G-Code     OutComes Score                                                  AM-PAC Score        AM-[facility] Inpatient Daily Activity Raw Score: 15 (10/02/20 1347)  AM-PAC Inpatient ADL T-Scale Score : 34.69 (10/02/20 1347)  ADL Inpatient CMS 0-100% Score: 56.46 (10/02/20 1347)  ADL Inpatient CMS G-Code Modifier : CK (10/02/20 1347)    Goals  Short term goals  Short term goal 1: min assist functional transfers  Short term goal 2: min assist dressing  Short term goal 3: min assist bathing  Short term goal 4: min assist toileting  Patient Goals   Patient goals : patient's goal is to improve strength, mobility and balance and then go to assisted living       Therapy Time   Individual Concurrent Group Co-treatment   Time In 1230         Time Out 1310         Minutes 40         Timed Code Treatment Minutes: 9300 Ivan Horan OT

## 2020-10-02 NOTE — CARE COORDINATION
Discharge Planning Assessment  RN discharge planner met with patient to discuss reason for admission, current living situation, and potential needs at the time of discharge    Demographics/Insurance verified Yes    Current type of dwellin jennifer house with 3 steps to get in and railings    Patient from ECF/SW confirmed with: N/A    Living arrangements: Lives alone    Level of function/Support: Independent in her ADLs  SonJuliana Lam very support    PCP:  Dr Adin Scanlon    Last Visit to PCP: 1 month ago    DME: Linzie Lipoma  Uses all the time. , life line button    Active with any community resources/agencies/skilled home care: yes  Nursing x1 wk, PT /OT  X 3 a wk. Cannot remember the agency  Also gets meals on wheels    Medication compliance issues: Denies    Financial issues that could impact healthcare:  No        Tentative discharge plan:  SNF    Discussed and provided facilities of choice if transition to a skilled nursing facility is required at the time of discharge-  Choices per patient and son-  Leslee Rivera    rob- [phone]      Discussed with patient and/or family that on the day of discharge home tentative time of discharge will be between 10 AM and noon.     Transportation at the time of discharge: TBD  On d/c

## 2020-10-02 NOTE — PROGRESS NOTES
Progress Note - Dr. Stephanie Rodas - Internal Medicine  PCP: Payal Byrd, [address] [facility] 10 / 111 [facility] [phone]    Hospital Day: 1  Code Status: Full Code  Current Diet: DIET GENERAL;        CC: follow up on medical issues    Subjective:   Jeana Pozo is a 66 y.o. female. She denies problems    Doing ok  inr now normal  Await pt/ot eval  Can resume coumadin    She denies chest pain, denies shortness of breath, denies nausea,  denies emesis. 10 system Review of Systems is reviewed with patient, and pertinent positives are noted in HPI above . Otherwise, Review of systems is negative. I have reviewed the patient's medical and social history in detail and updated the computerized patient record. To recap: She  has a past medical history of CHF (congestive heart failure) ([address] CVA (cerebral infarction), History of verrucae (wart) excision, Hyperlipidemia, Hypertension, Leg pain, Obstructive apnea, Unspecified cerebral artery occlusion with cerebral infarction, and Valvular disease. . She  has a past surgical history that includes Cardiac defibrillator placement; Abscess Drainage; Dental surgery; and pacemaker placement. . She  reports that she quit smoking about 39 years ago. She has a 0.50 pack-year smoking history. She has never used smokeless tobacco. She reports that she does not drink alcohol or use drugs. .        Active Hospital Problems    Diagnosis Date Noted    Elevated INR [R79.1] 10/01/2020    Hyperlipidemia [E78.5] 10/28/2015    Hemiparesis affecting left side as late effect of cerebrovascular accident [facility]) [I69.354] 09/05/2014    Depression [F32.9] 01/24/2014    Atrial fibrillation [I48.91] 06/20/2011       Current Facility-Administered Medications: aspirin EC tablet 81 mg, 81 mg, Oral, Daily  zolpidem (AMBIEN) tablet 5 mg, 5 mg, Oral, Nightly PRN  hydroCHLOROthiazide (HYDRODIURIL) tablet 25 mg, 25 mg, Oral, QAM  venlafaxine (EFFEXOR) tablet 75 mg, 75 mg, Oral, Daily  sacubitril-valsartan (ENTRESTO) 24-26 MG per tablet 1 tablet, 1 tablet, Oral, BID  digoxin (LANOXIN) tablet 125 mcg, 125 mcg, Oral, Daily  carvedilol (COREG) tablet 3.125 mg, 3.125 mg, Oral, BID WC  divalproex (DEPAKOTE ER) extended release tablet 250 mg, 250 mg, Oral, Daily  0.9 % sodium chloride infusion, , Intravenous, Continuous  sodium chloride flush 0.9 % injection 10 mL, 10 mL, Intravenous, 2 times per day  sodium chloride flush 0.9 % injection 10 mL, 10 mL, Intravenous, PRN  potassium chloride (KLOR-CON M) extended release tablet 40 mEq, 40 mEq, Oral, PRN **OR** potassium bicarb-citric acid (EFFER-K) effervescent tablet 40 mEq, 40 mEq, Oral, PRN **OR** potassium chloride 10 mEq/100 mL IVPB (Peripheral Line), 10 mEq, Intravenous, PRN  acetaminophen (TYLENOL) tablet 650 mg, 650 mg, Oral, Q6H PRN **OR** acetaminophen (TYLENOL) suppository 650 mg, 650 mg, Rectal, Q6H PRN  magnesium hydroxide (MILK OF MAGNESIA) 400 MG/5ML suspension 30 mL, 30 mL, Oral, Daily PRN  promethazine (PHENERGAN) tablet 12.5 mg, 12.5 mg, Oral, Q6H PRN **OR** ondansetron (ZOFRAN) injection 4 mg, 4 mg, Intravenous, Q6H PRN  famotidine (PEPCID) tablet 20 mg, 20 mg, Oral, BID PRN  rosuvastatin (CRESTOR) tablet 5 mg, 5 mg, Oral, Nightly         Objective:  /65   Pulse 71   Temp 97.8 °F (36.6 °C) (Oral)   Resp 16   Ht 5' 7\" (1.702 m)   Wt 180 lb 12.4 oz (82 kg)   SpO2 93%   BMI 28.31 kg/m²      Patient Vitals for the past 24 hrs:   BP Temp Temp src Pulse Resp SpO2 Weight   10/02/20 0338 122/65 97.8 °F (36.6 °C) Oral 71 16 93 % 180 lb 12.4 oz (82 kg)   10/02/20 0012 121/83 98.9 °F (37.2 °C) Oral 75 16 93 % --   10/01/20 2130 119/66 99.3 °F (37.4 °C) Oral 69 16 94 % --   10/01/20 1718 (!) 109/59 98.4 °F (36.9 °C) Axillary 87 16 -- --   10/01/20 0954 128/78 -- -- 87 -- -- --   10/01/20 0824 128/78 98.1 °F (36.7 °C) Oral 88 16 96 % --     Patient Vitals for the past 96 hrs (Last 3 readings):   Weight   10/02/20 0338 180 lb 12.4 oz (82 kg)   10/01/20 0426 171 lb 1.2 oz (77.6 kg)   09/30/20 2304 175 lb (79.4 kg)           Intake/Output Summary (Last 24 hours) at 10/2/2020 0748  Last data filed at 10/2/2020 5416  Gross per 24 hour   Intake 1353.75 ml   Output 850 ml   Net 503.75 ml         Physical Exam:   /65   Pulse 71   Temp 97.8 °F (36.6 °C) (Oral)   Resp 16   Ht 5' 7\" (1.702 m)   Wt 180 lb 12.4 oz (82 kg)   SpO2 93%   BMI 28.31 kg/m²   General appearance: alert, appears stated age and cooperative  Head: Normocephalic, without obvious abnormality, atraumatic  Lungs: clear to auscultation bilaterally  Heart: regular rate and rhythm, S1, S2 normal, no murmur, click, rub or gallop  Abdomen: soft, non-tender; bowel sounds normal; no masses,  no organomegaly  Extremities: extremities normal, atraumatic, no cyanosis or edema    Labs:  Lab Results   Component Value Date    WBC 11.7 (H) 10/02/2020    HGB 14.0 10/02/2020    HCT 42.2 10/02/2020     10/02/2020    CHOL 176 11/01/2019    TRIG 146 11/01/2019    HDL 64 (H) 11/01/2019    LDLDIRECT 116 (H) 04/24/2015    ALT 23 10/02/2020    AST 42 (H) 10/02/2020     (L) 10/02/2020    K 3.9 10/02/2020    K 3.9 10/02/2020     10/02/2020    CREATININE 0.5 (L) 10/02/2020    BUN 11 10/02/2020    CO2 23 10/02/2020    TSH 1.53 10/16/2013    INR 1.27 (H) 10/02/2020    LABMICR YES 10/01/2020     Lab Results   Component Value Date    TROPONINI <0.01 10/01/2020       Recent Imaging Results are Reviewed:  Ct Head Wo Contrast    Result Date: 9/30/2020  EXAMINATION: CT OF THE HEAD WITHOUT CONTRAST  9/30/2020 11:28 pm TECHNIQUE: CT of the head and cervical spine was performed without the administration of intravenous contrast. Dose modulation, iterative reconstruction, and/or weight based adjustment of the mA/kV was utilized to reduce the radiation dose to as low as reasonably achievable.; CT of the cervical spine was performed without the administration of intravenous contrast. Multiplanar reformatted images are provided for review. Dose modulation, iterative reconstruction, and/or weight based adjustment of the mA/kV was utilized to reduce the radiation dose to as low as reasonably achievable. COMPARISON: None. HISTORY: ORDERING SYSTEM PROVIDED HISTORY: fall, inr 13 TECHNOLOGIST PROVIDED HISTORY: If patient is on cardiac monitor and/or pulse ox, they may be taken off cardiac monitor and pulse ox, left on O2 if currently on. All monitors reattached when patient returns to room. Has a \"code stroke\" or \"stroke alert\" been called? ->No Reason for exam:->fall, inr 13 Reason for Exam: fall, inr 13. Acuity: Acute Type of Exam: Initial FINDINGS: Head: The study is somewhat degraded by the patient's motion. BRAIN/VENTRICLES: There is no definite acute intracranial hemorrhage, mass effect or midline shift. Large area of encephalomalacia involving the right frontal temporoparietal lobes, MCA territory. No abnormal extra-axial fluid collection. The gray-white differentiation is maintained without evidence of an acute infarct. There is no evidence of hydrocephalus. ORBITS: The visualized portion of the orbits demonstrate no acute abnormality. SINUSES: The visualized paranasal sinuses and mastoid air cells demonstrate no acute abnormality. SOFT TISSUES/SKULL:  No acute abnormality of the visualized skull or soft tissues. Cervical spine: BONES/ALIGNMENT: There is no acute fracture or traumatic malalignment. DEGENERATIVE CHANGES: Mild to moderate multilevel cervical spondylosis, most prominent C5-C6. SOFT TISSUES: Unremarkable prevertebral soft tissues. Ill-defined thyroid nodules noted measuring up to 1.1 cm in the right lobe. Vascular calcification is seen. No apical pneumothorax. Head CT: Somewhat degraded study by the patient's motion. No definite acute intracranial finding within the limitations of the study. Cervical spine: No acute fracture or subluxation.      Ct Cervical Spine Wo moderate multilevel cervical spondylosis, most prominent C5-C6. SOFT TISSUES: Unremarkable prevertebral soft tissues. Ill-defined thyroid nodules noted measuring up to 1.1 cm in the right lobe. Vascular calcification is seen. No apical pneumothorax. Head CT: Somewhat degraded study by the patient's motion. No definite acute intracranial finding within the limitations of the study. Cervical spine: No acute fracture or subluxation. Xr Chest Portable    Result Date: 10/1/2020  EXAMINATION: ONE XRAY VIEW OF THE CHEST 10/1/2020 1:19 am COMPARISON: 05/18/2009 HISTORY: ORDERING SYSTEM PROVIDED HISTORY: right sided chest pain TECHNOLOGIST PROVIDED HISTORY: Reason for exam:->right sided chest pain Reason for Exam: right sided chest pain Acuity: Acute Type of Exam: Initial Mechanism of Injury: patient fell Relevant Medical/Surgical History: previous CHF and hypertension FINDINGS: There is a dual lead ICD on the left. Cardiac size at the upper limits of normal.  COPD. Elevated right hemidiaphragm. Calcified lymph nodes related to remote granulomatous process. No pneumothorax is identified. No acute osseous abnormality is identified. Perihilar atelectatic change. Perihilar atelectasis with elevated right hemidiaphragm. Assessment and Plan:  Principal Problem:    Elevated INR -Established problem. Improving. S/p vit k. Now normal. Pt is obviously high fall risk  Plan: pt/ot erlin. Was going to coumadin clinic, will alert pharmacy staff  Active Problems:    Atrial fibrillation -Established problem. Stable. Plan: Continue present orders/plan. Depression -Established problem. Stable. mmood stable  Plan: Continue present orders/plan. Hemiparesis affecting left side as late effect of cerebrovascular -Established problem. Stable.    accident [facility])  Plan: pt/ot erlin requested    Hyperlipidemia  Plan: cont on statin            Marleni Savage  10/2/2020

## 2020-10-02 NOTE — CONSULTS
Clinical Pharmacy Note: Pharmacy to Dose Warfarin    Pharmacy consulted by Dr. Steve Kruger to dose warfarin. Lalita Smith is a 66 y.o. female  is receiving warfarin for indication: Afib. INR Goal Range: 2-3  Prior to admission warfarin dosing regimen: 1.25 mg on Sunday and 2.5 mg on all other days of the week   INR today:   Lab Results   Component Value Date    INR 1.27 10/02/2020       Assessment/Plan:  INR is supratherapeutic on prior to admission dosing regimen. Based on today's assessment, dose warfarin 1.25 mg daily. Decreased patient dose initially due to supratherapeutic INR on admission. Of note, patient did receive Vit K 5 mg PO x 2 doses yesterday. Daily INR is ordered. Pharmacy will continue to monitor and make adjustments to regimen as necessary.      Thank you for the consult,     Julia Ariza, PharmD, [phone] Pharmacist  [phone]

## 2020-10-02 NOTE — PROGRESS NOTES
Shift assessment complete. VSS. Pt denies pain, states having nausea at times. Call light within reach, bed and posey alarms engaged. Purewick in place. Will continue to monitor.

## 2020-10-02 NOTE — PROGRESS NOTES
Physician Progress Note      Montez Brothers  CSN #:                  [id number]  :                       1945  ADMIT DATE:       10/1/2020 12:05 AM  DISCH DATE:  RESPONDING  PROVIDER #:        Sean Vogt MD          QUERY TEXT:    Pt admitted with elevated INR  and noted  weakness with frequent falls at home   without significant injuries. If possible, please document in the progress   notes and discharge summary if you are evaluating and / or treating any of the   following: The medical record reflects the following:  Risk Factors:  66 y.o. female who presents to the emergency department for a   fall this evening. The patient reports that she has been falling frequently   of late  Clinical Indicators: Depression, confusion, weakness to left side due to   stroke, workup shows elevated INR, no other acute processes,/source of   infection. Pt son  concerned because she lives alone and with the frequent   falls he does not believe that she is able to take care of herself at home   safely,  Treatment: Vitamin K, monitoring, PT/OT consult    Thank you. Please contact me if you have any questions @ [email or web address],   Ed Lee RN, CDS  Options provided:  -- Age Related Physical Debility  -- Frailty  -- falls and elevated INR due to other, Please document other cause. -- Other - I will add my own diagnosis  -- Disagree - Not applicable / Not valid  -- Disagree - Clinically unable to determine / Unknown  -- Refer to Clinical Documentation Reviewer    PROVIDER RESPONSE TEXT:    This patient has age related physical debility.     Query created by: Shashi Murphy on 10/1/2020 12:33 PM      Electronically signed by:  Sean Vogt MD 10/2/2020 8:12 AM

## 2020-10-02 NOTE — PLAN OF CARE
Problem: Pain:  Goal: Pain level will decrease  Description: Pain level will decrease  10/2/2020 0854 by Leigh Nuñez RN  Outcome: Ongoing  10/1/2020 2159 by Syed Hernandez RN  Outcome: Ongoing  Goal: Control of acute pain  Description: Control of acute pain  Outcome: Ongoing  Goal: Control of chronic pain  Description: Control of chronic pain  Outcome: Ongoing     Problem: Falls - Risk of:  Goal: Will remain free from falls  Description: Will remain free from falls  10/2/2020 0854 by Leigh Nuñez RN  Outcome: Ongoing  10/1/2020 2159 by Syed Hernandez RN  Outcome: Ongoing  Goal: Absence of physical injury  Description: Absence of physical injury  Outcome: Ongoing     Problem: Skin Integrity:  Goal: Will show no infection signs and symptoms  Description: Will show no infection signs and symptoms  10/2/2020 0854 by Leigh Nuñez RN  Outcome: Ongoing  10/1/2020 2159 by Syed Hernandez RN  Outcome: Ongoing  Goal: Absence of new skin breakdown  Description: Absence of new skin breakdown  10/2/2020 0854 by Leigh Nuñez RN  Outcome: Ongoing  10/1/2020 2159 by Syed Hernandez RN  Outcome: Ongoing

## 2020-10-02 NOTE — PROGRESS NOTES
Physical Therapy    Facility/Department: [address] NURSING  Initial Assessment    NAME: Shara Esquivel  :   MRN: [id number]    Date of Service: 10/2/2020    Discharge Recommendations: Shara Esquivel scored a 13/24 on the AM-PAC short mobility form. Current research shows that an AM-PAC score of 17 or less is typically not associated with a discharge to the patient's home setting. Based on the patient's AM-PAC score and their current functional mobility deficits, it is recommended that the patient have 3-5 sessions per week of Physical Therapy at d/c to increase the patient's independence. Please see assessment section for further patient specific details. If patient discharges prior to next session this note will serve as a discharge summary. Please see below for the latest assessment towards goals. PT Equipment Recommendations  Equipment Needed: No    Assessment   Body structures, Functions, Activity limitations: Decreased strength;Decreased safe awareness;Decreased endurance;Decreased sensation;Decreased balance  Assessment: The pt presents with decreased balance, BLE strength, L sided awareness, coordination and activity tolerance. She is a high fall risk due to all of these issues. She is unsafe to return home alone. She has poor safety awareness and insight into her deficits. Prognosis: Good  Decision Making: Medium Complexity  PT Education: PT Role;General Safety;Gait Training;Plan of Care;Precautions;Transfer Training  Patient Education: DC recommendation, pt verbalized understanding  Barriers to Learning: cognition  REQUIRES PT FOLLOW UP: Yes  Activity Tolerance  Activity Tolerance: Patient Tolerated treatment well;Patient limited by endurance       Patient Diagnosis(es): The primary encounter diagnosis was Fall, initial encounter. Diagnoses of General weakness and Supratherapeutic INR were also pertinent to this visit.      has a past medical history of CHF (congestive heart failure) ([address] CVA (cerebral infarction), History of verrucae (wart) excision, Hyperlipidemia, Hypertension, Leg pain, Obstructive apnea, Unspecified cerebral artery occlusion with cerebral infarction, and Valvular disease. has a past surgical history that includes Cardiac defibrillator placement; Abscess Drainage; Dental surgery; and pacemaker placement. Restrictions  Restrictions/Precautions  Restrictions/Precautions: Fall Risk  Position Activity Restriction  Other position/activity restrictions: The pt was admitted with an elevated INR and frequent falls. Vision/Hearing -WFL        Subjective  General  Chart Reviewed: Yes  Family / Caregiver Present: Yes(son, Nathen)  Diagnosis: elevated INR with frequent falls  Follows Commands: Within Functional Limits  Other (Comment): pt takes increased time to respond and react to any cue  General Comment  Comments: pt was supine in bed upon PT arrival, agreeable to PT  Subjective  Subjective: pt denied pain, son is concerned about the pt's safety at home, pt stated she fatigues quickly  Pain Screening  Patient Currently in Pain: Denies(Simultaneous filing. User may not have seen previous data.)  Vital Signs  Patient Currently in Pain: Denies(Simultaneous filing.  User may not have seen previous data.)       Orientation  Orientation  Overall Orientation Status: Within Functional Limits  Social/Functional History  Social/Functional History  Lives With: Alone  Type of Home: House  Home Layout: One level  Home Access: Stairs to enter with rails  Entrance Stairs - Number of Steps: 4  Bathroom Shower/Tub: Tub/Shower unit  Bathroom Toilet: Standard  Bathroom Equipment: Tub transfer bench, Grab bars in shower, Hand-held shower  Home Equipment: Quad cane  Receives Help From: Family  ADL Assistance: Independent  Homemaking Assistance: (neighbors cut grass, laundry on first floor)  Ambulation Assistance: Independent  Transfer Assistance: Independent  Active : Yes(limited, son grocery shops)  Mode of Transportation: Car  Leisure & Hobbies: likes going to lunch with friends  Additional Comments: home therapy,  two to three falls a month    Objective  AROM RLE (degrees)  RLE AROM: WFL  AROM LLE (degrees)  LLE AROM : WFL  Strength RLE  Strength RLE: WFL  Strength LLE  Strength LLE: Exception  Comment: hip flexion and knee extension WFL, ankle DF 2/5, eversion 2/5  Tone RLE  RLE Tone: Normotonic  Tone LLE  LLE Tone: Normotonic  Motor Control  Gross Motor?: Exceptions  Comments: LUE rests in flexed synergy position, L sided neglect  Sensation  Overall Sensation Status: Impaired(decreased sensation LUE/LLE, pt denied sensory deficits but appeared to have decreased proprioception)  Bed mobility  Supine to Sit: Minimal assistance  Sit to Supine: Minimal assistance  Scooting: Minimal assistance  Transfers  Sit to Stand: Contact guard assistance  Stand to sit: Minimal Assistance(attempted to sit too early when approaching the chair)  Ambulation  Ambulation?: Yes  Ambulation 1  Surface: level tile  Device: Hand-Held Assist;Large Raul Somerset  Assistance: Minimal assistance  Quality of Gait: very small steps, step to gait pattern, pt either didn't advance LBQC or moved it very little or left it behind entirely, L genu recurvatum, unsteady  Gait Deviations: Slow Yaquelin;Decreased step length;Decreased step height  Distance: 15' x 2  Stairs/Curb  Stairs?: No     Balance  Posture: Good  Sitting - Static: Good  Sitting - Dynamic: Fair(pt had difficulty donning her shoes)  Standing - Static: Fair;+(SBA standing static at the sink)  Standing - Dynamic: -;Fair(min A for balance)  Comments: Pt can stand for 3 minutes at a time before requiring a rest break. She was assisted with pericare and a brief change as she was incontinent of urine.         Plan   Plan  Times per week: 3-5  Times per day: Daily  Current Treatment Recommendations: Strengthening, Balance Training, Transfer Training, Endurance Training, Neuromuscular Re-education, Safety Education & Training, Gait Training  Safety Devices  Type of devices: All fall risk precautions in place, Bed alarm in place, Call light within reach, Nurse notified, Left in bed                AM-PAC Score  AM-PAC Inpatient Mobility Raw Score : 13 (10/02/20 1342)  AM-PAC Inpatient T-Scale Score : 36.74 (10/02/20 1342)  Mobility Inpatient CMS 0-100% Score: 64.91 (10/02/20 1342)  Mobility Inpatient CMS G-Code Modifier : CL (10/02/20 1342)          Goals  Short term goals  Time Frame for Short term goals: To be met prior to DC  Short term goal 1: Pt will perform bed mobility with CGA  Short term goal 2: Pt will perform sit to/from stand with SBA  Short term goal 3: Pt will ambulate 36' with quad cane and CGA.   Patient Goals   Patient goals : son worries about the son at home and hopes the pt till transition to AL, pt is agreeable to DC to a SNF       Therapy Time   Individual Concurrent Group Co-treatment   Time In 1241         Time Out 1321         Minutes 40         Timed Code Treatment Minutes: [address] [place], [place] DPT [phone]

## 2020-10-03 NOTE — PLAN OF CARE
Problem: Pain:  Goal: Pain level will decrease  Description: Pain level will decrease  Outcome: Ongoing  Note: Medicated x1 this shift for headache with relief sufficient for sleep. Problem: Falls - Risk of:  Goal: Will remain free from falls  Description: Will remain free from falls  Outcome: Ongoing  Note: Remains free from falls this shift. Problem: Neurological  Goal: Maximum potential motor/sensory/cognitive function  10/3/2020 0711 by Linda Xavier RN  Note: No new neuro symptoms noted this shift.   10/3/2020 0710 by Linda Xavier RN  Outcome: Ongoing

## 2020-10-03 NOTE — PLAN OF CARE
Problem: Falls - Risk of:  Goal: Will remain free from falls  Description: Will remain free from falls  10/3/2020 1407 by John Lopez RN  Outcome: Ongoing  Note: Pt is wearing the fall bracelet, S.A.F.E. sign is posted outside door, and yellow blanket is on the bed. Pt informed of fall risks, verbalizes understanding, and agrees to ask for help to ambulate. Will monitor.

## 2020-10-03 NOTE — CARE COORDINATION
SW spoke with Hoda Waters at [facility] who states they can assist pt with placement to [facility], however, precert pending. Will need updated PT/OT notes on Sunday or Monday and COVID test.  CM ordered COVID test this date.     Connor Delcid MSW, [address]

## 2020-10-03 NOTE — DISCHARGE INSTR - COC
Continuity of Care Form    Patient Name: Hussein Duran   :    MRN:  [id number]    Admit date:  10/1/2020  Discharge date:  10/08/2020    Code Status Order: Full Code   Advance Directives:   [facility] Documentation       Date/Time Healthcare Directive Type of Healthcare Directive Copy in [address] Agent's Name Healthcare Agent's Phone Number    10/01/20 0455  No, patient does not have an advance directive for healthcare treatment -- -- -- -- --            Admitting Physician:  Myke Alvarez MD  PCP: Ranjeet Bynum MD    Discharging Nurse: Mini Roland RN  [address] Unit/Room#: 3WQ-8931/4486-21  Discharging Unit Phone Number: [phone]    Emergency Contact:   Extended Emergency Contact Information  Primary Emergency Contact: Chidi [address] Phone: [phone]  Relation: Child    Past Surgical History:  Past Surgical History:   Procedure Laterality Date    ABSCESS DRAINAGE      CARDIAC DEFIBRILLATOR PLACEMENT      DENTAL SURGERY      extraction of all teeth for dentures    PACEMAKER PLACEMENT         Immunization History:   Immunization History   Administered Date(s) Administered    Influenza Vaccine, unspecified formulation 2016, 10/29/2018    Influenza Virus Vaccine 10/10/2007    Influenza, High Dose (Fluzone 65 yrs and older) 2017, 10/18/2018       Active Problems:  Patient Active Problem List   Diagnosis Code    Peripheral vascular disease ([facility] I73.9    Atrial fibrillation I48.91    Cardiomyopathy ([address] I42.9    LBBB (left bundle branch block) I44.7    Cerebral infarction ([address] I86.9    Systolic CHF, chronic (HCC) I50.22    Seizure disorder G40.909    Chronic anticoagulation Z79.01    AICD (automatic cardioverter/defibrillator) present Z95.810    Partial epilepsy with impairment of consciousness ([address] G40.209    Other paralytic syndrome affecting nondominant side, 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 G40.209    Parkinsonian tremor (HCC) G20    Elevated INR R79.1       Isolation/Infection:   Isolation            No Isolation          Patient Infection Status       None to display            Nurse Assessment:  Last Vital Signs: /66   Pulse 82   Temp 97.6 °F (36.4 °C) (Oral)   Resp 16   Ht 5' 7\" (1.702 m)   Wt 175 lb 1.6 oz (79.4 kg)   SpO2 94%   BMI 27.42 kg/m²     Last documented pain score (0-10 scale): Pain Level: 0  Last Weight:   Wt Readings from Last 1 Encounters:   10/03/20 175 lb 1.6 oz (79.4 kg)     Mental Status:  oriented and alert    IV Access:  - None    Nursing Mobility/ADLs:  Walking   Assisted x 1 and 4 point cane  Transfer  Assisted x 1  Bathing  Assisted x 1  Dressing  Assisted x 1  Toileting  Assisted x 1  Feeding  Independent after set up  Med Admin  Independent after set up  Med Delivery   whole    Wound Care Documentation and Therapy:        Elimination:  Continence:   · Bowel: Yes  · Bladder: No  Urinary Catheter: None   Colostomy/Ileostomy/Ileal Conduit: No       Date of Last BM: 10/07/2020    Intake/Output Summary (Last 24 hours) at 10/3/2020 0904  Last data filed at 10/3/2020 0600  Gross per 24 hour   Intake 2823.6 ml   Output 350 ml   Net 2473.6 ml     I/O last 3 completed shifts: In: 2823.6 [I.V.:2823.6]  Out: 350 [Urine:350]    Safety Concerns:     History of Falls (last 30 days) and History of Seizures    Impairments/Disabilities:      Paralysis - Left side-arm- (weakness to Lower)    Nutrition Therapy:  Current Nutrition Therapy:   - Oral Diet:  Cardiac    Routes of Feeding: Oral  Liquids:  Thin Liquids  Daily Fluid Restriction: no  Last Modified Barium Swallow with Video (Video Swallowing Test): not done    Treatments at the Time of Hospital Discharge:   Respiratory Treatments: none  Oxygen Therapy:  is not on home oxygen therapy. Ventilator:    - No ventilator support    Rehab Therapies: Physical Therapy and Occupational Therapy  Weight Bearing Status/Restrictions: No weight bearing restirctions  Other Medical Equipment (for information only, NOT a DME order):  wheelchair, cane, walker, bedside commode and hospital bed  Other Treatments: none    Patient's personal belongings (please select all that are sent with patient):  none    RN SIGNATURE:  Electronically signed by Martina Cheung RN on 10/8/20 at 4:09 PM EDT           CASE MANAGEMENT/SOCIAL WORK SECTION    Inpatient Status Date: 10/01/2020    Readmission Risk Assessment Score:  Readmission Risk              Risk of Unplanned Readmission:        12           Discharging to Facility/ Agency   Name: [facility]  PHONE: [phone]  · FAX: [phone]  · Address:  · Phone:  · Fax:    Dialysis Facility (if applicable)   · Name:  · Address:  · Dialysis Schedule:  · Phone:  · Fax:    / signature: Electronically signed by Maribel Graham RN on 10/8/20 at 4:04 PM EDT    PHYSICIAN SECTION    Prognosis: Guarded    Condition at Discharge: Stable    Rehab Potential (if transferring to Rehab): Good    Recommended Labs or Other Treatments After Discharge:      Physician Certification: I certify the above information and transfer of Shara Esquivel  is necessary for the continuing treatment of the diagnosis listed and that she requires [facility] for greater 30 days.      Update Admission H&P: No change in H&P    PHYSICIAN SIGNATURE:  Electronically signed by Flaquito Berg MD on 10/3/20 at 9:05 AM EDT

## 2020-10-03 NOTE — PROGRESS NOTES
Pharmacy to Dose Warfarin    Pharmacy consulted to dose warfarin for Afib. INR Goal: 2-3    INR today: 1.17    Assessment/Plan:  - INR is subtherapeutic today. - Will give 3 mg today. - Daily INR ordered. Pharmacy will continue to follow.     Shazia Gardiner, PharmD  PGY1 Pharmacy Resident  [id number]

## 2020-10-03 NOTE — PROGRESS NOTES
VSS, assessment as charted. New bag of IV fluids hung by Mo Bentley RN @handoff noted not scanned, scanned in @this time. No report of pain, denies needs @present.

## 2020-10-03 NOTE — CARE COORDINATION
ELISA Shelley in admissions at [facility] to determin if they have accepted and started precert. Awaiting call back. SW to follow.     Addie Alvarez MSW, [address]

## 2020-10-03 NOTE — PROGRESS NOTES
Progress Note - Dr. Baldomero Ragsdale - Internal Medicine  PCP: Cachorro Richey, [address] / [id number] [place] [address] Day: 2  Code Status: Full Code  Current Diet: DIET GENERAL;        CC: follow up on medical issues    Subjective:   Audie Rahman is a 66 y.o. female. She denies problems    Doing ok  inr still low (pt needed to receive vit k x 2)   pt/ot eval rec snf   resumed on coumadin    She denies chest pain, denies shortness of breath, denies nausea,  denies emesis. 10 system Review of Systems is reviewed with patient, and pertinent positives are noted in HPI above . Otherwise, Review of systems is negative. I have reviewed the patient's medical and social history in detail and updated the computerized patient record. To recap: She  has a past medical history of CHF (congestive heart failure) ([address] CVA (cerebral infarction), History of verrucae (wart) excision, Hyperlipidemia, Hypertension, Leg pain, Obstructive apnea, Unspecified cerebral artery occlusion with cerebral infarction, and Valvular disease. . She  has a past surgical history that includes Cardiac defibrillator placement; Abscess Drainage; Dental surgery; and pacemaker placement. . She  reports that she quit smoking about 39 years ago. She has a 0.50 pack-year smoking history. She has never used smokeless tobacco. She reports that she does not drink alcohol or use drugs. .        Active Hospital Problems    Diagnosis Date Noted    Elevated INR [R79.1] 10/01/2020    Hyperlipidemia [E78.5] 10/28/2015    Hemiparesis affecting left side as late effect of cerebrovascular accident [facility]) [I69.354] 09/05/2014    Depression [F32.9] 01/24/2014    Atrial fibrillation [I48.91] 06/20/2011       Current Facility-Administered Medications: warfarin (COUMADIN) tablet 1.25 mg, 1.25 mg, Oral, Daily  aspirin EC tablet 81 mg, 81 mg, Oral, Daily  zolpidem (AMBIEN) tablet 5 mg, 5 mg, Oral, Nightly PRN  hydroCHLOROthiazide (HYDRODIURIL) tablet 25 mg, 25 mg, Oral, QAM  venlafaxine (EFFEXOR) tablet 75 mg, 75 mg, Oral, Daily  sacubitril-valsartan (ENTRESTO) 24-26 MG per tablet 1 tablet, 1 tablet, Oral, BID  digoxin (LANOXIN) tablet 125 mcg, 125 mcg, Oral, Daily  carvedilol (COREG) tablet 3.125 mg, 3.125 mg, Oral, BID WC  divalproex (DEPAKOTE ER) extended release tablet 250 mg, 250 mg, Oral, Daily  0.9 % sodium chloride infusion, , Intravenous, Continuous  sodium chloride flush 0.9 % injection 10 mL, 10 mL, Intravenous, 2 times per day  sodium chloride flush 0.9 % injection 10 mL, 10 mL, Intravenous, PRN  potassium chloride (KLOR-CON M) extended release tablet 40 mEq, 40 mEq, Oral, PRN **OR** potassium bicarb-citric acid (EFFER-K) effervescent tablet 40 mEq, 40 mEq, Oral, PRN **OR** potassium chloride 10 mEq/100 mL IVPB (Peripheral Line), 10 mEq, Intravenous, PRN  acetaminophen (TYLENOL) tablet 650 mg, 650 mg, Oral, Q6H PRN **OR** acetaminophen (TYLENOL) suppository 650 mg, 650 mg, Rectal, Q6H PRN  magnesium hydroxide (MILK OF MAGNESIA) 400 MG/5ML suspension 30 mL, 30 mL, Oral, Daily PRN  promethazine (PHENERGAN) tablet 12.5 mg, 12.5 mg, Oral, Q6H PRN **OR** ondansetron (ZOFRAN) injection 4 mg, 4 mg, Intravenous, Q6H PRN  famotidine (PEPCID) tablet 20 mg, 20 mg, Oral, BID PRN  rosuvastatin (CRESTOR) tablet 5 mg, 5 mg, Oral, Nightly         Objective:  /66   Pulse 82   Temp 97.6 °F (36.4 °C) (Oral)   Resp 16   Ht 5' 7\" (1.702 m)   Wt 175 lb 1.6 oz (79.4 kg)   SpO2 94%   BMI 27.42 kg/m²      Patient Vitals for the past 24 hrs:   BP Temp Temp src Pulse Resp SpO2 Weight   10/03/20 0800 118/66 97.6 °F (36.4 °C) Oral 82 16 94 % --   10/03/20 0506 97/64 97.5 °F (36.4 °C) Axillary 74 14 94 % 175 lb 1.6 oz (79.4 kg)   10/03/20 0025 113/73 98.4 °F (36.9 °C) Oral 80 16 94 % --   10/02/20 2029 128/71 98.5 °F (36.9 °C) Oral 76 16 91 % --     Patient Vitals for the past 96 hrs (Last 3 readings):   Weight   10/03/20 0506 175 lb 1.6 oz (79.4 kg) 10/02/20 0338 180 lb 12.4 oz (82 kg)   10/01/20 0426 171 lb 1.2 oz (77.6 kg)           Intake/Output Summary (Last 24 hours) at 10/3/2020 7331  Last data filed at 10/3/2020 0600  Gross per 24 hour   Intake 2823.6 ml   Output 350 ml   Net 2473.6 ml         Physical Exam:   /66   Pulse 82   Temp 97.6 °F (36.4 °C) (Oral)   Resp 16   Ht 5' 7\" (1.702 m)   Wt 175 lb 1.6 oz (79.4 kg)   SpO2 94%   BMI 27.42 kg/m²   General appearance: alert, appears stated age and cooperative  Head: Normocephalic, without obvious abnormality, atraumatic  Lungs: clear to auscultation bilaterally  Heart: regular rate and rhythm, S1, S2 normal, no murmur, click, rub or gallop  Abdomen: soft, non-tender; bowel sounds normal; no masses,  no organomegaly  Extremities: extremities normal, atraumatic, no cyanosis or edema    Labs:  Lab Results   Component Value Date    WBC 10.2 10/03/2020    HGB 13.9 10/03/2020    HCT 41.1 10/03/2020     10/03/2020    CHOL 176 11/01/2019    TRIG 146 11/01/2019    HDL 64 (H) 11/01/2019    LDLDIRECT 116 (H) 04/24/2015    ALT 23 10/02/2020    AST 42 (H) 10/02/2020     10/03/2020    K 3.8 10/03/2020     10/03/2020    CREATININE <0.5 (L) 10/03/2020    BUN 8 10/03/2020    CO2 25 10/03/2020    TSH 1.53 10/16/2013    INR 1.17 (H) 10/03/2020    LABMICR YES 10/01/2020     Lab Results   Component Value Date    TROPONINI <0.01 10/01/2020       Recent Imaging Results are Reviewed:  Ct Head Wo Contrast    Result Date: 9/30/2020  EXAMINATION: CT OF THE HEAD WITHOUT CONTRAST  9/30/2020 11:28 pm TECHNIQUE: CT of the head and cervical spine was performed without the administration of intravenous contrast. Dose modulation, iterative reconstruction, and/or weight based adjustment of the mA/kV was utilized to reduce the radiation dose to as low as reasonably achievable.; CT of the cervical spine was performed without the administration of intravenous contrast. Multiplanar reformatted images are provided for review. Dose modulation, iterative reconstruction, and/or weight based adjustment of the mA/kV was utilized to reduce the radiation dose to as low as reasonably achievable. COMPARISON: None. HISTORY: ORDERING SYSTEM PROVIDED HISTORY: fall, inr 13 TECHNOLOGIST PROVIDED HISTORY: If patient is on cardiac monitor and/or pulse ox, they may be taken off cardiac monitor and pulse ox, left on O2 if currently on. All monitors reattached when patient returns to room. Has a \"code stroke\" or \"stroke alert\" been called? ->No Reason for exam:->fall, inr 13 Reason for Exam: fall, inr 13. Acuity: Acute Type of Exam: Initial FINDINGS: Head: The study is somewhat degraded by the patient's motion. BRAIN/VENTRICLES: There is no definite acute intracranial hemorrhage, mass effect or midline shift. Large area of encephalomalacia involving the right frontal temporoparietal lobes, MCA territory. No abnormal extra-axial fluid collection. The gray-white differentiation is maintained without evidence of an acute infarct. There is no evidence of hydrocephalus. ORBITS: The visualized portion of the orbits demonstrate no acute abnormality. SINUSES: The visualized paranasal sinuses and mastoid air cells demonstrate no acute abnormality. SOFT TISSUES/SKULL:  No acute abnormality of the visualized skull or soft tissues. Cervical spine: BONES/ALIGNMENT: There is no acute fracture or traumatic malalignment. DEGENERATIVE CHANGES: Mild to moderate multilevel cervical spondylosis, most prominent C5-C6. SOFT TISSUES: Unremarkable prevertebral soft tissues. Ill-defined thyroid nodules noted measuring up to 1.1 cm in the right lobe. Vascular calcification is seen. No apical pneumothorax. Head CT: Somewhat degraded study by the patient's motion. No definite acute intracranial finding within the limitations of the study. Cervical spine: No acute fracture or subluxation.      Ct Cervical Spine Wo Contrast    Result Date: spondylosis, most prominent C5-C6. SOFT TISSUES: Unremarkable prevertebral soft tissues. Ill-defined thyroid nodules noted measuring up to 1.1 cm in the right lobe. Vascular calcification is seen. No apical pneumothorax. Head CT: Somewhat degraded study by the patient's motion. No definite acute intracranial finding within the limitations of the study. Cervical spine: No acute fracture or subluxation. Xr Chest Portable    Result Date: 10/1/2020  EXAMINATION: ONE XRAY VIEW OF THE CHEST 10/1/2020 1:19 am COMPARISON: 05/18/2009 HISTORY: ORDERING SYSTEM PROVIDED HISTORY: right sided chest pain TECHNOLOGIST PROVIDED HISTORY: Reason for exam:->right sided chest pain Reason for Exam: right sided chest pain Acuity: Acute Type of Exam: Initial Mechanism of Injury: patient fell Relevant Medical/Surgical History: previous CHF and hypertension FINDINGS: There is a dual lead ICD on the left. Cardiac size at the upper limits of normal.  COPD. Elevated right hemidiaphragm. Calcified lymph nodes related to remote granulomatous process. No pneumothorax is identified. No acute osseous abnormality is identified. Perihilar atelectatic change. Perihilar atelectasis with elevated right hemidiaphragm. Assessment and Plan:  Principal Problem:    Elevated INR -Established problem. S/p vit k. INR 1.17. Pt is obviously high fall risk  Plan: pt/ot erlin. Was going to coumadin clinic,  pharmacy staff following  Active Problems:    Atrial fibrillation -Established problem. Stable. Plan: Continue present orders/plan. Depression -Established problem. Stable. mmood stable  Plan: Continue present orders/plan. Hemiparesis affecting left side as late effect of cerebrovascular -Established problem. Stable. accident [facility])  Plan: pt/ot eval requested    Hyperlipidemia  Plan: cont on statin      Disp - pt needs SNF. Will try to set up.  Medically stable for d/c but placement over weekend may be an issue          Mirella Bye  10/3/2020

## 2020-10-04 NOTE — PROGRESS NOTES
Physical Therapy  Facility/Department: [facility] 3A NURSING  Daily Treatment Note  NAME: Handy Hollins  : [id number]  MRN: [id number]    Date of Service: 10/4/2020    Discharge Recommendations:  Handy Hollins scored a 13/24 on the AM-PAC short mobility form. Current research shows that an AM-PAC score of 17 or less is typically not associated with a discharge to the patient's home setting. Based on the patient's AM-PAC score and their current functional mobility deficits, it is recommended that the patient have 3-5 sessions per week of Physical Therapy at d/c to increase the patient's independence. Please see assessment section for further patient specific details. If patient discharges prior to next session this note will serve as a discharge summary. Please see below for the latest assessment towards goals. 3-5 sessions per week, 24 hour supervision or assist   PT Equipment Recommendations  Equipment Needed: No    Assessment   Body structures, Functions, Activity limitations: Decreased strength;Decreased safe awareness;Decreased endurance;Decreased sensation;Decreased balance  Assessment: Patient requiring assistance with all functional mobility, declined ambulation this session. Recommend 24 hour assist and continued therapy to improve balance and safety during mobility tasks. Prognosis: Good  PT Education: PT Role;General Safety;Gait Training;Plan of Care;Precautions;Transfer Training  Patient Education: Patient verbalized understanding. Barriers to Learning: cognition  REQUIRES PT FOLLOW UP: Yes  Activity Tolerance  Activity Tolerance: Patient Tolerated treatment well;Patient limited by endurance; Patient limited by fatigue     Patient Diagnosis(es): The primary encounter diagnosis was Fall, initial encounter. Diagnoses of General weakness and Supratherapeutic INR were also pertinent to this visit.      has a past medical history of CHF (congestive heart failure) ([address] CVA (cerebral infarction), History of verrucae (wart) excision, Hyperlipidemia, Hypertension, Leg pain, Obstructive apnea, Unspecified cerebral artery occlusion with cerebral infarction, and Valvular disease. has a past surgical history that includes Cardiac defibrillator placement; Abscess Drainage; Dental surgery; and pacemaker placement. Restrictions  Restrictions/Precautions  Restrictions/Precautions: Fall Risk(high fall risk)  Required Braces or Orthoses?: No  Position Activity Restriction  Other position/activity restrictions: The pt was admitted with an elevated INR and frequent falls. Subjective   General  Chart Reviewed: Yes  Family / Caregiver Present: Yes  Subjective  Subjective: Patient reports just getting back into bed. General Comment  Comments: Patient supine in bed. Pain Screening  Patient Currently in Pain: Denies  Vital Signs  Patient Currently in Pain: Denies       Orientation  Orientation  Overall Orientation Status: Within Functional Limits     Objective   Bed mobility  Supine to Sit: Moderate assistance  Sit to Supine: Moderate assistance  Scooting: Moderate assistance  Transfers  Sit to Stand: Contact guard assistance  Ambulation  Ambulation?: No  Stairs/Curb  Stairs?: No     Balance  Posture: Good  Sitting - Static: Good  Sitting - Dynamic: Fair  Standing - Static: Fair;+  Standing - Dynamic: Fair;-      AROM RLE (degrees)  RLE AROM: WFL  AROM LLE (degrees)  LLE AROM : WFL  Strength RLE  Strength RLE: WFL  Strength LLE  Strength LLE: Exception  Comment: hip flexion and knee extension WFL, ankle DF 2/5, eversion 2/5     AM-PAC Score  AM-PAC Inpatient Mobility Raw Score : 13 (10/04/20 1607)  AM-PAC Inpatient T-Scale Score : 36.74 (10/04/20 1607)  Mobility Inpatient CMS 0-100% Score: 64.91 (10/04/20 1607)  Mobility Inpatient CMS G-Code Modifier : CL (10/04/20 1607)     Goals  Short term goals  Time Frame for Short term goals: To be met prior to DC  Short term goal 1: Pt will perform bed mobility with CGA. (Not met)  Short term goal 2: Pt will perform sit to/from stand with SBA. (Not met)  Short term goal 3: Pt will ambulate 36' with quad cane and CGA.  (Not met)  Patient Goals   Patient goals : son worries about the son at home and hopes the pt till transition to [place], pt is agreeable to DC to a SNF    Plan    Plan  Times per week: 3-5  Times per day: Daily  Current Treatment Recommendations: Strengthening, Balance Training, Transfer Training, Endurance Training, Neuromuscular Re-education, Safety Education & Training, Gait Training  Safety Devices  Type of devices:  All fall risk precautions in place, Bed alarm in place, Call light within reach, Nurse notified, Left in bed, Gait belt, Patient at risk for falls  Restraints  Initially in place: No     Therapy Time   Individual Concurrent Group Co-treatment   Time In 1514         Time Out 1555         Minutes 41         Timed Code Treatment Minutes: [address], [address] [facility], DPT, ATC-R [id number]

## 2020-10-04 NOTE — PLAN OF CARE
Problem: Falls - Risk of:  Goal: Will remain free from falls  Description: Will remain free from falls  Outcome: Ongoing  Note: Pt is wearing the fall bracelet, S.A.F.E. sign is posted outside door, and yellow blanket is on the bed. Pt informed of fall risks, verbalizes understanding, and agrees to ask for help to ambulate. Will monitor.

## 2020-10-04 NOTE — PROGRESS NOTES
mg, 5 mg, Oral, Nightly PRN  hydroCHLOROthiazide (HYDRODIURIL) tablet 25 mg, 25 mg, Oral, QAM  venlafaxine (EFFEXOR) tablet 75 mg, 75 mg, Oral, Daily  sacubitril-valsartan (ENTRESTO) 24-26 MG per tablet 1 tablet, 1 tablet, Oral, BID  digoxin (LANOXIN) tablet 125 mcg, 125 mcg, Oral, Daily  carvedilol (COREG) tablet 3.125 mg, 3.125 mg, Oral, BID WC  divalproex (DEPAKOTE ER) extended release tablet 250 mg, 250 mg, Oral, Daily  sodium chloride flush 0.9 % injection 10 mL, 10 mL, Intravenous, 2 times per day  sodium chloride flush 0.9 % injection 10 mL, 10 mL, Intravenous, PRN  potassium chloride (KLOR-CON M) extended release tablet 40 mEq, 40 mEq, Oral, PRN **OR** potassium bicarb-citric acid (EFFER-K) effervescent tablet 40 mEq, 40 mEq, Oral, PRN **OR** potassium chloride 10 mEq/100 mL IVPB (Peripheral Line), 10 mEq, Intravenous, PRN  acetaminophen (TYLENOL) tablet 650 mg, 650 mg, Oral, Q6H PRN **OR** acetaminophen (TYLENOL) suppository 650 mg, 650 mg, Rectal, Q6H PRN  magnesium hydroxide (MILK OF MAGNESIA) 400 MG/5ML suspension 30 mL, 30 mL, Oral, Daily PRN  promethazine (PHENERGAN) tablet 12.5 mg, 12.5 mg, Oral, Q6H PRN **OR** ondansetron (ZOFRAN) injection 4 mg, 4 mg, Intravenous, Q6H PRN  famotidine (PEPCID) tablet 20 mg, 20 mg, Oral, BID PRN  rosuvastatin (CRESTOR) tablet 5 mg, 5 mg, Oral, Nightly         Objective:  /68   Pulse 77   Temp 97.8 °F (36.6 °C) (Oral)   Resp 16   Ht 5' 7\" (1.702 m)   Wt 179 lb 1 oz (81.2 kg)   SpO2 94%   BMI 28.05 kg/m²      Patient Vitals for the past 24 hrs:   BP Temp Temp src Pulse Resp SpO2 Weight   10/04/20 0800 109/68 97.8 °F (36.6 °C) Oral 77 16 94 % 179 lb 1 oz (81.2 kg)   10/04/20 0424 94/64 97.5 °F (36.4 °C) Oral 80 18 93 % --   10/04/20 0036 107/73 98.6 °F (37 °C) Oral 76 18 96 % --   10/03/20 1915 108/73 98 °F (36.7 °C) Oral 90 18 94 % --   10/03/20 1625 100/65 97.6 °F (36.4 °C) Oral 80 16 94 % --     Patient Vitals for the past 96 hrs (Last 3 readings): Weight   10/04/20 0800 179 lb 1 oz (81.2 kg)   10/03/20 0506 175 lb 1.6 oz (79.4 kg)   10/02/20 0338 180 lb 12.4 oz (82 kg)           Intake/Output Summary (Last 24 hours) at 10/4/2020 0943  Last data filed at 10/3/2020 1811  Gross per 24 hour   Intake 480 ml   Output 225 ml   Net 255 ml         Physical Exam:   /68   Pulse 77   Temp 97.8 °F (36.6 °C) (Oral)   Resp 16   Ht 5' 7\" (1.702 m)   Wt 179 lb 1 oz (81.2 kg)   SpO2 94%   BMI 28.05 kg/m²   General appearance: alert, appears stated age and cooperative  Head: Normocephalic, without obvious abnormality, atraumatic  Lungs: clear to auscultation bilaterally  Heart: regular rate and rhythm, S1, S2 normal, no murmur, click, rub or gallop  Abdomen: soft, non-tender; bowel sounds normal; no masses,  no organomegaly  Extremities: extremities normal, atraumatic, no cyanosis or edema    Labs:  Lab Results   Component Value Date    WBC 10.8 10/04/2020    HGB 13.9 10/04/2020    HCT 41.5 10/04/2020     10/04/2020    CHOL 176 11/01/2019    TRIG 146 11/01/2019    HDL 64 (H) 11/01/2019    LDLDIRECT 116 (H) 04/24/2015    ALT 23 10/02/2020    AST 42 (H) 10/02/2020     (L) 10/04/2020    K 3.8 10/04/2020     10/04/2020    CREATININE 0.6 10/04/2020    BUN 9 10/04/2020    CO2 27 10/04/2020    TSH 1.53 10/16/2013    INR 1.18 (H) 10/04/2020    LABMICR YES 10/01/2020     Lab Results   Component Value Date    TROPONINI <0.01 10/01/2020       Recent Imaging Results are Reviewed:  Ct Head Wo Contrast    Result Date: 9/30/2020  EXAMINATION: CT OF THE HEAD WITHOUT CONTRAST  9/30/2020 11:28 pm TECHNIQUE: CT of the head and cervical spine was performed without the administration of intravenous contrast. Dose modulation, iterative reconstruction, and/or weight based adjustment of the mA/kV was utilized to reduce the radiation dose to as low as reasonably achievable.; CT of the cervical spine was performed without the administration of intravenous contrast. Multiplanar reformatted images are provided for review. Dose modulation, iterative reconstruction, and/or weight based adjustment of the mA/kV was utilized to reduce the radiation dose to as low as reasonably achievable. COMPARISON: None. HISTORY: ORDERING SYSTEM PROVIDED HISTORY: fall, inr 13 TECHNOLOGIST PROVIDED HISTORY: If patient is on cardiac monitor and/or pulse ox, they may be taken off cardiac monitor and pulse ox, left on O2 if currently on. All monitors reattached when patient returns to room. Has a \"code stroke\" or \"stroke alert\" been called? ->No Reason for exam:->fall, inr 13 Reason for Exam: fall, inr 13. Acuity: Acute Type of Exam: Initial FINDINGS: Head: The study is somewhat degraded by the patient's motion. BRAIN/VENTRICLES: There is no definite acute intracranial hemorrhage, mass effect or midline shift. Large area of encephalomalacia involving the right frontal temporoparietal lobes, MCA territory. No abnormal extra-axial fluid collection. The gray-white differentiation is maintained without evidence of an acute infarct. There is no evidence of hydrocephalus. ORBITS: The visualized portion of the orbits demonstrate no acute abnormality. SINUSES: The visualized paranasal sinuses and mastoid air cells demonstrate no acute abnormality. SOFT TISSUES/SKULL:  No acute abnormality of the visualized skull or soft tissues. Cervical spine: BONES/ALIGNMENT: There is no acute fracture or traumatic malalignment. DEGENERATIVE CHANGES: Mild to moderate multilevel cervical spondylosis, most prominent C5-C6. SOFT TISSUES: Unremarkable prevertebral soft tissues. Ill-defined thyroid nodules noted measuring up to 1.1 cm in the right lobe. Vascular calcification is seen. No apical pneumothorax. Head CT: Somewhat degraded study by the patient's motion. No definite acute intracranial finding within the limitations of the study. Cervical spine: No acute fracture or subluxation.      Ct Cervical Spine Wo Contrast    Result Date: 9/30/2020  EXAMINATION: CT OF THE HEAD WITHOUT CONTRAST  9/30/2020 11:28 pm TECHNIQUE: CT of the head and cervical spine was performed without the administration of intravenous contrast. Dose modulation, iterative reconstruction, and/or weight based adjustment of the mA/kV was utilized to reduce the radiation dose to as low as reasonably achievable.; CT of the cervical spine was performed without the administration of intravenous contrast. Multiplanar reformatted images are provided for review. Dose modulation, iterative reconstruction, and/or weight based adjustment of the mA/kV was utilized to reduce the radiation dose to as low as reasonably achievable. COMPARISON: None. HISTORY: ORDERING SYSTEM PROVIDED HISTORY: fall, inr 13 TECHNOLOGIST PROVIDED HISTORY: If patient is on cardiac monitor and/or pulse ox, they may be taken off cardiac monitor and pulse ox, left on O2 if currently on. All monitors reattached when patient returns to room. Has a \"code stroke\" or \"stroke alert\" been called? ->No Reason for exam:->fall, inr 13 Reason for Exam: fall, inr 13. Acuity: Acute Type of Exam: Initial FINDINGS: Head: The study is somewhat degraded by the patient's motion. BRAIN/VENTRICLES: There is no definite acute intracranial hemorrhage, mass effect or midline shift. Large area of encephalomalacia involving the right frontal temporoparietal lobes, MCA territory. No abnormal extra-axial fluid collection. The gray-white differentiation is maintained without evidence of an acute infarct. There is no evidence of hydrocephalus. ORBITS: The visualized portion of the orbits demonstrate no acute abnormality. SINUSES: The visualized paranasal sinuses and mastoid air cells demonstrate no acute abnormality. SOFT TISSUES/SKULL:  No acute abnormality of the visualized skull or soft tissues. Cervical spine: BONES/ALIGNMENT: There is no acute fracture or traumatic malalignment.  DEGENERATIVE CHANGES: Mild to moderate multilevel cervical spondylosis, most prominent C5-C6. SOFT TISSUES: Unremarkable prevertebral soft tissues. Ill-defined thyroid nodules noted measuring up to 1.1 cm in the right lobe. Vascular calcification is seen. No apical pneumothorax. Head CT: Somewhat degraded study by the patient's motion. No definite acute intracranial finding within the limitations of the study. Cervical spine: No acute fracture or subluxation. Xr Chest Portable    Result Date: 10/1/2020  EXAMINATION: ONE XRAY VIEW OF THE CHEST 10/1/2020 1:19 am COMPARISON: 05/18/2009 HISTORY: ORDERING SYSTEM PROVIDED HISTORY: right sided chest pain TECHNOLOGIST PROVIDED HISTORY: Reason for exam:->right sided chest pain Reason for Exam: right sided chest pain Acuity: Acute Type of Exam: Initial Mechanism of Injury: patient fell Relevant Medical/Surgical History: previous CHF and hypertension FINDINGS: There is a dual lead ICD on the left. Cardiac size at the upper limits of normal.  COPD. Elevated right hemidiaphragm. Calcified lymph nodes related to remote granulomatous process. No pneumothorax is identified. No acute osseous abnormality is identified. Perihilar atelectatic change. Perihilar atelectasis with elevated right hemidiaphragm. Assessment and Plan:  Principal Problem:    Elevated INR -Established problem. S/p vit k. INR 1.18. Pt is obviously high fall risk  Plan: pt/ot erlin. Was going to coumadin clinic,  pharmacy staff following  Active Problems:    Atrial fibrillation -Established problem. Stable. Plan: Continue present orders/plan. Depression -Established problem. Stable. mmood stable  Plan: Continue present orders/plan. Hemiparesis affecting left side as late effect of cerebrovascular -Established problem. Stable. accident [facility])  Plan: pt/ot eval requested    Hyperlipidemia  Plan: cont on statin      Disp - pt needs SNF. Will try to set up.  Medically stable for d/c but placement over weekend may be an issue          Odalis Cleary  10/4/2020

## 2020-10-04 NOTE — PROGRESS NOTES
Pharmacy to Dose Warfarin    Pharmacy consulted to dose warfarin for Afib. INR Goal: 2-3    INR today: 1.18    Assessment/Plan:  - INR subtherapeutic today. Insignificant increase likely due to vitamin K administration.  - Will increase warfarin to 5 mg daily.   - Daily INR ordered. Pharmacy will continue to follow.     Leonila Jansen, PharmD  PGY1 Pharmacy Resident  [id number]

## 2020-10-05 NOTE — PROGRESS NOTES
Occupational Therapy  Facility/Department: [facility] 3A NURSING  Daily Treatment Note  NAME: August Cardoso  :   MRN: [id number]    Date of Service: 10/5/2020    Discharge Recommendations:  August Cardoso scored a 15/24 on the AM-PAC ADL Inpatient form. Current research shows that an AM-PAC score of 17 or less is typically not associated with a discharge to the patient's home setting. Based on the patient's AM-PAC score and their current ADL deficits, it is recommended that the patient have 3-5 sessions per week of Occupational Therapy at d/c to increase the patient's independence. Please see assessment section for further patient specific details. If patient discharges prior to next session this note will serve as a discharge summary. Please see below for the latest assessment towards goals. OT Equipment Recommendations  Equipment Needed: No    Assessment   Performance deficits / Impairments: Decreased functional mobility ; Decreased ADL status; Decreased high-level IADLs;Decreased safe awareness;Decreased balance;Decreased endurance;Decreased cognition  Treatment Diagnosis: decreased activity tolerance due to late affects of left hemiplegia  Prognosis: Good  Assistance / Modification: rw  OT Education: OT Role;Plan of Care;Transfer Training  Patient Education: discharge; pt verbalized understanding, may require reinforcement  Barriers to Learning: decreased self awareness; cognition  REQUIRES OT FOLLOW UP: Yes  Activity Tolerance  Activity Tolerance: Patient limited by fatigue  Activity Tolerance: pt tolerated treatment but participation and performance limited by fatigue  Safety Devices  Safety Devices in place: Yes  Type of devices: All fall risk precautions in place;Call light within reach; Bed alarm in place;Nurse notified; Left in bed;Gait belt  Restraints  Initially in place: No         Patient Diagnosis(es): The primary encounter diagnosis was Fall, initial encounter.  Diagnoses of General weakness and Supratherapeutic INR were also pertinent to this visit. has a past medical history of CHF (congestive heart failure) ([address] CVA (cerebral infarction), History of verrucae (wart) excision, Hyperlipidemia, Hypertension, Leg pain, Obstructive apnea, Unspecified cerebral artery occlusion with cerebral infarction, and Valvular disease. has a past surgical history that includes Cardiac defibrillator placement; Abscess Drainage; Dental surgery; and pacemaker placement. Restrictions  Restrictions/Precautions  Restrictions/Precautions: Fall Risk  Required Braces or Orthoses?: No  Position Activity Restriction  Other position/activity restrictions: The pt was admitted with an elevated INR and frequent falls. Subjective   General  Chart Reviewed: Yes  Response to previous treatment: Patient with no complaints from previous session  Family / Caregiver Present: No  Diagnosis: left hemiplegia, frequent falls at home  Subjective  Subjective: Pt supine in bed upon arrival; tired but agreeable to OT tx w/ much encouragement from OT. Pt stating she has a headache w/pain 2/10. RN notified and administered Tylenol during session. Pain Assessment  Pain Assessment: 0-10  Pain Level: 2  Pain Type: Acute pain  Pain Location: Head  Pain Orientation: Posterior  Pain Radiating Towards: Neck to top of head  Pain Descriptors: Headache  Pre Treatment Pain Screening  Intervention List: Patient able to continue with treatment;Nurse/Physician notified  Vital Signs  Patient Currently in Pain: Yes   Orientation  Orientation  Overall Orientation Status: Within Normal Limits  Objective    ADL  Grooming: Contact guard assistance(standing at sink to wash face and comb hair)  LE Dressing: Moderate assistance(SBA don/doff shoes; Dep. for socks stating she doesn't typically wear them;  Mod A to don brief)  Additional Comments: declined further ADLs        Balance  Sitting Balance: Stand by assistance(Pt tired and leaning to R side to lay down while seated EOB; pt laid back in bed while seated EOB stating it was unintentional but observed to be controlled and purposeful. SBA to return to upright sitting position.)  Standing Balance: Contact guard assistance  Standing Balance  Time: ~3 min  Activity: Functional mobility to sink for grooming ax and back to EOB  Functional Mobility  Functional - Mobility Device: Cane(LBQC)  Activity: To/from bathroom  Assist Level: Contact guard assistance  Functional Mobility Comments: pt leaving cane behind reaching for walls and furniture stating she prefers this; pt with LOB when attempting to sit EOB after functional mobility from sink  Bed mobility  Supine to Sit: Minimal assistance(w/HOB elevated and use of bed rail)  Sit to Supine: Stand by assistance  Scooting: Dependent/Total(Max x2 to scoot to head of bed while lying down)  Transfers  Stand Step Transfers: Contact guard assistance  Sit to stand: Contact guard assistance  Stand to sit: Contact guard assistance                       Cognition  Overall Cognitive Status: Exceptions  Arousal/Alertness: Appropriate responses to stimuli  Following Commands: Follows one step commands consistently; Follows one step commands with increased time  Attention Span: Attends with cues to redirect  Memory: Decreased recall of precautions  Safety Judgement: Decreased awareness of need for safety;Decreased awareness of need for assistance  Problem Solving: Decreased awareness of errors;Assistance required to generate solutions;Assistance required to implement solutions;Assistance required to identify errors made;Assistance required to correct errors made  Insights: Decreased awareness of deficits  Initiation: Requires cues for some  Sequencing: Requires cues for some     Perception  Overall Perceptual Status: Impaired  Unilateral Attention: Cues to maintain midline in sitting(pt not observed to have inattention to L side this date; pt able to get comb from L side standing at sink to comb hair, pt required no cues to comb L side of hair or wash L side of face)  Initiation: Cues to initiate tasks  Motor Planning: Appears intact                                   Plan   Plan  Times per week: 3-5  Times per day: Daily  Current Treatment Recommendations: Self-Care / ADL, Safety Education & Training, Patient/Caregiver Education & Training, Balance Training, Functional Mobility Training, Cognitive Reorientation, Endurance Training    AM-PAC Score        AM-PAC Inpatient Daily Activity Raw Score: 15 (10/05/20 1229)  AM-PAC Inpatient ADL T-Scale Score : 34.69 (10/05/20 1229)  ADL Inpatient CMS 0-100% Score: 56.46 (10/05/20 1229)  ADL Inpatient CMS G-Code Modifier : CK (10/05/20 1229)    Goals  Short term goals  Short term goal 1: min assist functional transfers -- CGA 10/5  Short term goal 2: min assist dressing -- Mod A LB [id number]  Short term goal 3: min assist bathing -- not addressed 10/5  Short term goal 4: min assist toileting -- not addressed, [place] in place 10/5  Patient Goals   Patient goals : patient's goal is to improve strength, mobility and balance and then go to assisted living       Therapy Time   Individual Concurrent Group Co-treatment   Time In 1136         Time Out 1202         Minutes 26              Timed Code Treatment Minutes:  26 min    Total Treatment Minutes:  26 min    AMARJIT [address] 64, OT     Jordyn Cook S/OT  I have directly observed this treatment and have read and approve this note.    Dipak Saba, OTR/L, [id number]

## 2020-10-05 NOTE — DISCHARGE SUMMARY
[facility] -- Physician Discharge Summary     Radha Wilson  1945  MRN: [id number]    Admit Date: 10/1/2020  Discharge Date: No discharge date for patient encounter. Attending MD: Vickey Sesay MD  Discharging MD: Vickey Sesay MD  PCP: Quentin Wolff MD [address] [id number] / [facility] [phone]    Admission Diagnosis: Elevated INR [R79.1]  Elevated INR [R79.1]  DISCHARGE DIAGNOSIS: same    Full Hospital Problem List:  Active Hospital Problems    Diagnosis Date Noted    Elevated INR [R79.1] 10/01/2020    Hyperlipidemia [E78.5] 10/28/2015    Hemiparesis affecting left side as late effect of cerebrovascular accident [facility]) [I69.354] 09/05/2014    Depression [F32.9] 01/24/2014    Atrial fibrillation [I48.91] 06/20/2011           Hospital Course:  66 y. o. female who presents to the emergency department for a fall this evening.  The patient reports that she has been falling frequently of late. Yara Lozada has a history of CVA about 12 years ago with residual left-sided weakness but over the past few months she has been falling.  She last fell on Sunday and then again today. Dariel Rojas son brought her in because he was concerned as she is anticoagulated and her INR today was 13.  He is also concerned because she lives alone and with the frequent falls he does not believe that she is able to take care of herself at home safely.  The patient denies any headache no changes in her vision or speech no chest pain or difficulty breathing she has been feeling nauseated     PT's INR is reversed with vitamin K  At time of d/c, it is still low, 1.42  Dosing has been taken over by pharmacy and will be managed via coumadin clinic      Pt seen by pt/ot  SNF is recommended  Pt is set up for Harjinder Bruins made during Hospitalization:  IP CONSULT TO INTERNAL MEDICINE  IP CONSULT TO SOCIAL WORK  IP CONSULT TO PHARMACY    Treatment team at time of Discharge: Treatment Team: Attending Provider: Donna Cordova MD; Consulting Physician: Donna Cordova MD; : Juaquin Hernández RN; Respiratory Therapist (Day): Sherlyn Garza RCP; Registered Nurse: Severo Dolin, RN; Nursing Student: Suma Powell; Utilization Reviewer: Jennifer Acuna RN    Imaging Results:  Ct Head Wo Contrast    Result Date: 9/30/2020  EXAMINATION: CT OF THE HEAD WITHOUT CONTRAST  9/30/2020 11:28 pm TECHNIQUE: CT of the head and cervical spine was performed without the administration of intravenous contrast. Dose modulation, iterative reconstruction, and/or weight based adjustment of the mA/kV was utilized to reduce the radiation dose to as low as reasonably achievable.; CT of the cervical spine was performed without the administration of intravenous contrast. Multiplanar reformatted images are provided for review. Dose modulation, iterative reconstruction, and/or weight based adjustment of the mA/kV was utilized to reduce the radiation dose to as low as reasonably achievable. COMPARISON: None. HISTORY: ORDERING SYSTEM PROVIDED HISTORY: fall, inr 13 TECHNOLOGIST PROVIDED HISTORY: If patient is on cardiac monitor and/or pulse ox, they may be taken off cardiac monitor and pulse ox, left on O2 if currently on. All monitors reattached when patient returns to room. Has a \"code stroke\" or \"stroke alert\" been called? ->No Reason for exam:->fall, inr 13 Reason for Exam: fall, inr 13. Acuity: Acute Type of Exam: Initial FINDINGS: Head: The study is somewhat degraded by the patient's motion. BRAIN/VENTRICLES: There is no definite acute intracranial hemorrhage, mass effect or midline shift. Large area of encephalomalacia involving the right frontal temporoparietal lobes, MCA territory. No abnormal extra-axial fluid collection. The gray-white differentiation is maintained without evidence of an acute infarct. There is no evidence of hydrocephalus.  ORBITS: The visualized portion of the orbits demonstrate no

## 2020-10-05 NOTE — FLOWSHEET NOTE
10/05/20 1600   Vital Signs   Temp 97.6 °F (36.4 °C)   Temp Source Oral   Pulse 75   Heart Rate Source Monitor   Resp 16   /63   BP Location Right upper arm   BP Upper/Lower Upper

## 2020-10-05 NOTE — PROGRESS NOTES
Physical Therapy  Facility/Department: [facility] 3A NURSING  Daily Treatment Note  NAME: Clem Crump  :   MRN: [id number]    Date of Service: 10/5/2020    Discharge Recommendations:  Clem Crump scored a 15/24 on the AM-PAC short mobility form. Current research shows that an AM-PAC score of 17 or less is typically not associated with a discharge to the patient's home setting. Based on the patient's AM-PAC score and their current functional mobility deficits, it is recommended that the patient have 3-5 sessions per week of Physical Therapy at d/c to increase the patient's independence. Please see assessment section for further patient specific details. If patient discharges prior to next session this note will serve as a discharge summary. Please see below for the latest assessment towards goals. 2-3 sessions per week, Patient would benefit from continued therapy after discharge   PT Equipment Recommendations  Other: plan to continue to assess pending progress and likely defer recommendation to next level of care - [facility]    Assessment   Body structures, Functions, Activity limitations: Decreased strength;Decreased safe awareness;Decreased endurance;Decreased sensation;Decreased balance;Decreased functional mobility ; Decreased ADL status; Decreased ROM  Assessment: Patient demonstrates impaired functional mobility, requiring (A) for all mobility. Patient demonstrates poor insight related to mobility, states \"I feel like I could manage at home\" despite safety concerns, implusively attempting to sit, reaching for walls and furniture for support during gait instead of utilizing cane. Patient reports she often forgets cane at home, [place] fall more than I can keep track of,\" and calls paramedics for assistances at least 1-2/month. Patient will continue to benefit from additional skilled PT intervention to facilitate safe mobility and to optimize (I).   Treatment Diagnosis: impaired functional hand placement)  Squat Pivot Transfers: Minimal Assistance(with right LBQC - cues to complete transfer and to avoid sitting prematurely)  Ambulation  Ambulation?: Yes  Ambulation 1  Surface: level tile  Device: (right LBQC)  Assistance: Minimal assistance  Quality of Gait: step to gait, reaching for furniture and walls instead of utilizing cane, left genu recurvatum, narrow base of support, slow elijah, forward flexed  Distance: 22ft     Balance  Posture: Fair(forward flexed, rounded shoulders)  Sitting - Static: Good  Sitting - Dynamic: Good;-  Standing - Static: Fair  Standing - Dynamic: Fair;-  Comments: stood at sink with CGA/min A    AM-PAC Score  AM-PAC Inpatient Mobility Raw Score : 15 (10/05/20 1126)  AM-PAC Inpatient T-Scale Score : 39.45 (10/05/20 1126)  Mobility Inpatient CMS 0-100% Score: 57.7 (10/05/20 1126)  Mobility Inpatient CMS G-Code Modifier : CK (10/05/20 1126)          Goals  Short term goals  Time Frame for Short term goals: To be met prior to DC - all goals ongoing 10/5/2020  Short term goal 1: Pt will perform bed mobility with CGA.  (Not met)  Short term goal 2: Pt will perform sit to/from stand with SBA.   (Not met)  Short term goal 3: Pt will ambulate 36' with quad cane and CGA.  (Not met)  Patient Goals   Patient goals : \"to get independence back\" - per chart review, son is worried about patient home alone and hopes patient will transition to assisted living    Plan    Plan  Times per week: 3-5  Times per day: Daily  Current Treatment Recommendations: Strengthening, Balance Training, Transfer Training, Endurance Training, Neuromuscular Re-education, Safety Education & Training, Gait Training, Functional Mobility Training, Patient/Caregiver Education & Training, Equipment Evaluation, Education, & procurement, Home Exercise Program, ROM  Safety Devices  Type of devices: Call light within reach, Chair alarm in place, Gait belt, Left in chair, Nurse notified  Restraints  Initially in place: No Therapy Time   Individual Concurrent Group Co-treatment   Time In 1029         Time Out 1053         Minutes 24                 Timed Code Treatment Minutes: 24 minutes    Total Treatment Minutes: 24 Minutes    If patient discharges prior to next treatment, this note will serve as discharge summary.     Lawyer Darrel GEORGE, DPT #[id number]

## 2020-10-05 NOTE — PROGRESS NOTES
Pharmacy to Dose Warfarin    Pharmacy consulted to dose warfarin for Afib. INR Goal: 2-3    INR today: 1.42    Assessment/Plan:  - INR subtherapeutic again today but trending up now  - Patient received dose of 5 mg last night. Will decrease dose to 2.5 mg tonight  - Daily INR ordered    Pharmacy will continue to follow.     Bello Caal, PharmD, [address]  Clinical Pharmacist  [id number]

## 2020-10-05 NOTE — FLOWSHEET NOTE
Hospitalist notified of BP. Patient asymptomatic at this time.  Will continue to monitor     10/05/20 1315   Vital Signs   Temp 97.4 °F (36.3 °C)   Temp Source Oral   Pulse 70   Heart Rate Source Monitor   Resp 16   BP (!) 76/51   BP Location Right upper arm   BP Upper/Lower Upper   MAP (mmHg) (!) 60   Patient Position Semi fowlers

## 2020-10-06 NOTE — PROGRESS NOTES
Physical Therapy  Facility/Department: [address] NURSING  Daily Treatment Note  NAME: Ger Ferguson  :   MRN: [id number]    Date of Service: 10/6/2020    Discharge Recommendations:Mariposa Flores scored a 14/24 on the AM-PAC short mobility form. Current research shows that an AM-PAC score of 17 or less is typically not associated with a discharge to the patient's home setting. Based on the patient's AM-PAC score and their current functional mobility deficits, it is recommended that the patient have 3-5 sessions per week of Physical Therapy at d/c to increase the patient's independence. Please see assessment section for further patient specific details. If patient discharges prior to next session this note will serve as a discharge summary. Please see below for the latest assessment towards goals. 2-3 sessions per week, Patient would benefit from continued therapy after discharge        Assessment   Body structures, Functions, Activity limitations: Decreased strength;Decreased safe awareness;Decreased endurance;Decreased sensation;Decreased balance;Decreased functional mobility ; Decreased ADL status; Decreased ROM  Assessment: Min A bed mob and transfers, CGA gait, poor safety awareness. Patient demonstrates impaired functional mobility, requiring (A) for all mobility. Patient demonstrates poor insight related to mobility, states \"I feel like I could manage at home\" despite safety concerns, implusively attempting to sit, reaching for walls and furniture for support during gait instead of utilizing cane. Patient reports she often forgets cane at home, [place] fall more than I can keep track of,\" and calls paramedics for assistances at least 1-2/month. Patient will continue to benefit from additional skilled PT intervention to facilitate safe mobility and to optimize (I).   Treatment Diagnosis: impaired functional mobility  Prognosis: Good  PT Education: PT Role;General Safety;Gait Training;Plan of Care;Precautions;Transfer Training;Equipment;Home Exercise Program;Functional Mobility Training  Patient Education: Patient verbalized understanding, needs reinforcement. Barriers to Learning: decreased safety awareness and poor insight  REQUIRES PT FOLLOW UP: Yes  Activity Tolerance  Activity Tolerance: Patient Tolerated treatment well  Activity Tolerance: motivated to progress and participate     Patient Diagnosis(es): The primary encounter diagnosis was Fall, initial encounter. Diagnoses of General weakness and Supratherapeutic INR were also pertinent to this visit. has a past medical history of CHF (congestive heart failure) ([address] CVA (cerebral infarction), History of verrucae (wart) excision, Hyperlipidemia, Hypertension, Leg pain, Obstructive apnea, Unspecified cerebral artery occlusion with cerebral infarction, and Valvular disease. has a past surgical history that includes Cardiac defibrillator placement; Abscess Drainage; Dental surgery; and pacemaker placement. Restrictions  Restrictions/Precautions  Restrictions/Precautions: Fall Risk(high fall risk)  Required Braces or Orthoses?: No(has L LE brace (AFO?) but does not wear, \"It's too hard to get on\")  Position Activity Restriction  Other position/activity restrictions: The pt was admitted with an elevated INR and frequent falls. Subjective   General  Chart Reviewed: Yes  Response To Previous Treatment: Patient with no complaints from previous session. Family / Caregiver Present: No  Subjective  Subjective: Pt reports no pain, agreeable to working with PT. Wants to get cleaned up. General Comment  Comments: Pt supine in bed upon arrival. Pt spent time up in chair already today. Asking to return to bed at end of treatment.   Pain Screening  Patient Currently in Pain: Denies  Vital Signs  Patient Currently in Pain: Denies       Orientation  Orientation  Overall Orientation Status: Impaired(decreased safety awareness and questionable insight; disoriented to time)  Cognition      Objective   Bed mobility  Bridging: Contact guard assistance  Supine to Sit: Minimal assistance  Sit to Supine: Minimal assistance  Scooting: Moderate assistance(scoot to EOB)  Transfers  Sit to Stand: Minimal Assistance  Stand to sit: Contact guard assistance  Comment: multiple attempts needed before able to stand. Ambulation  Ambulation?: Yes  Ambulation 1  Surface: level tile  Device: Large Base Quad Cane(right [facility])  Assistance: Contact guard assistance  Quality of Gait: step to gait, reaching for furniture and walls instead of utilizing cane, left genu recurvatum, narrow base of support, slow elijah, forward flexed, L hip trendelenburg  Distance: Pt amb with LBQC and CGA for 15 ft. Comments: Pt amb to sink with cane, then refused cane and used countertop. VCs needed for facing sink squarely. Pt brushed teeth with set up assist and close SBA for balance. Pt used cane to bed and then walked away from it despite VCs. Poor safety awareness. Pt stating that she was ok with falling \"because the paramedics just come and pick me up off the floor. \"  Stairs/Curb  Stairs?: No     Balance  Posture: Fair(forward flexed, rounded shoulders)  Sitting - Static: Good  Sitting - Dynamic: Good;-  Standing - Static: Fair(close SBA)  Standing - Dynamic: Fair;-(CGA for amb, with LBQC.)  Exercises  Hip Flexion: seated marching x 10 bilat  Knee Long Arc Quad: x 10 bilat  Ankle Pumps: HR/TR x 10 bilat         Comment: ADLs at sink              G-Code     OutComes Score                                                     AM-PAC Score  AM-PAC Inpatient Mobility Raw Score : 14 (10/06/20 1444)  AM-PAC Inpatient T-Scale Score : 38.1 (10/06/20 1444)  Mobility Inpatient CMS 0-100% Score: 61.29 (10/06/20 1444)  Mobility Inpatient CMS G-Code Modifier : CL (10/06/20 1444)          Goals  Short term goals  Time Frame for Short term goals:  To be met prior to DC - all goals ongoing 10/6/2020  Short term goal 1: Pt will perform bed mobility with CGA.  (Not met)  Short term goal 2: Pt will perform sit to/from stand with SBA. (Not met)  Short term goal 3: Pt will ambulate 36' with quad cane and CGA.  (Not met)  Patient Goals   Patient goals : \"to get independence back\" - per chart review, son is worried about patient home alone and hopes patient will transition to assisted living.     Plan    Plan  Times per week: 3-5  Times per day: Daily  Current Treatment Recommendations: Strengthening, Balance Training, Transfer Training, Endurance Training, Neuromuscular Re-education, Safety Education & Training, Gait Training, Functional Mobility Training, Patient/Caregiver Education & Training, Equipment Evaluation, Education, & procurement, Home Exercise Program, ROM  Safety Devices  Type of devices: Call light within reach, Gait belt, Nurse notified, Bed alarm in place, All fall risk precautions in place, Left in bed  Restraints  Initially in place: No     Therapy Time   Individual Concurrent Group Co-treatment   Time In 1402         Time Out 1427         Minutes 25         Timed Code Treatment Minutes: [address], [id number]

## 2020-10-06 NOTE — PROGRESS NOTES
Pharmacy to Dose Warfarin    Pharmacy consulted to dose warfarin for Afib. INR Goal: 2-3    INR today: 2.06    Assessment/Plan:  - INR therapeutic today after receiving increased dose of 5 mg on Sunday. - Will give warfarin 1.25 mg tonight as INR increased significantly from yesterday and patient was supratherapeutic on admission with home dose    Pharmacy will continue to follow.     Bello Caal, PharmD, [address]  Clinical Pharmacist  [id number]

## 2020-10-06 NOTE — PROGRESS NOTES
MD sent message concerning pt's watery BMs and nausea. Pt medicated with zofran. Will cont to monitor.

## 2020-10-06 NOTE — PROGRESS NOTES
Daily  lip balm petroleum free (PHYTOPLEX) stick, , Topical, PRN  aspirin EC tablet 81 mg, 81 mg, Oral, Daily  zolpidem (AMBIEN) tablet 5 mg, 5 mg, Oral, Nightly PRN  hydroCHLOROthiazide (HYDRODIURIL) tablet 25 mg, 25 mg, Oral, QAM  venlafaxine (EFFEXOR) tablet 75 mg, 75 mg, Oral, Daily  sacubitril-valsartan (ENTRESTO) 24-26 MG per tablet 1 tablet, 1 tablet, Oral, BID  digoxin (LANOXIN) tablet 125 mcg, 125 mcg, Oral, Daily  carvedilol (COREG) tablet 3.125 mg, 3.125 mg, Oral, BID WC  divalproex (DEPAKOTE ER) extended release tablet 250 mg, 250 mg, Oral, Daily  sodium chloride flush 0.9 % injection 10 mL, 10 mL, Intravenous, 2 times per day  sodium chloride flush 0.9 % injection 10 mL, 10 mL, Intravenous, PRN  potassium chloride (KLOR-CON M) extended release tablet 40 mEq, 40 mEq, Oral, PRN **OR** potassium bicarb-citric acid (EFFER-K) effervescent tablet 40 mEq, 40 mEq, Oral, PRN **OR** potassium chloride 10 mEq/100 mL IVPB (Peripheral Line), 10 mEq, Intravenous, PRN  acetaminophen (TYLENOL) tablet 650 mg, 650 mg, Oral, Q6H PRN **OR** acetaminophen (TYLENOL) suppository 650 mg, 650 mg, Rectal, Q6H PRN  magnesium hydroxide (MILK OF MAGNESIA) 400 MG/5ML suspension 30 mL, 30 mL, Oral, Daily PRN  promethazine (PHENERGAN) tablet 12.5 mg, 12.5 mg, Oral, Q6H PRN **OR** ondansetron (ZOFRAN) injection 4 mg, 4 mg, Intravenous, Q6H PRN  famotidine (PEPCID) tablet 20 mg, 20 mg, Oral, BID PRN  rosuvastatin (CRESTOR) tablet 5 mg, 5 mg, Oral, Nightly         Objective:  /70   Pulse 82   Temp 97.6 °F (36.4 °C) (Oral)   Resp 18   Ht 5' 7\" (1.702 m)   Wt 178 lb 5 oz (80.9 kg)   SpO2 93%   BMI 27.93 kg/m²      Patient Vitals for the past 24 hrs:   BP Temp Temp src Pulse Resp SpO2 Weight   10/06/20 0736 -- -- -- -- -- -- 178 lb 5 oz (80.9 kg)   10/06/20 0529 109/70 97.6 °F (36.4 °C) Oral 82 18 93 % --   10/06/20 0018 108/71 -- -- -- -- -- --   10/06/20 0002 88/63 98 °F (36.7 °C) Oral 80 16 92 % --   10/05/20 2002 (!) 103/57 spine was performed without the administration of intravenous contrast. Dose modulation, iterative reconstruction, and/or weight based adjustment of the mA/kV was utilized to reduce the radiation dose to as low as reasonably achievable.; CT of the cervical spine was performed without the administration of intravenous contrast. Multiplanar reformatted images are provided for review. Dose modulation, iterative reconstruction, and/or weight based adjustment of the mA/kV was utilized to reduce the radiation dose to as low as reasonably achievable. COMPARISON: None. HISTORY: ORDERING SYSTEM PROVIDED HISTORY: fall, inr 13 TECHNOLOGIST PROVIDED HISTORY: If patient is on cardiac monitor and/or pulse ox, they may be taken off cardiac monitor and pulse ox, left on O2 if currently on. All monitors reattached when patient returns to room. Has a \"code stroke\" or \"stroke alert\" been called? ->No Reason for exam:->fall, inr 13 Reason for Exam: fall, inr 13. Acuity: Acute Type of Exam: Initial FINDINGS: Head: The study is somewhat degraded by the patient's motion. BRAIN/VENTRICLES: There is no definite acute intracranial hemorrhage, mass effect or midline shift. Large area of encephalomalacia involving the right frontal temporoparietal lobes, MCA territory. No abnormal extra-axial fluid collection. The gray-white differentiation is maintained without evidence of an acute infarct. There is no evidence of hydrocephalus. ORBITS: The visualized portion of the orbits demonstrate no acute abnormality. SINUSES: The visualized paranasal sinuses and mastoid air cells demonstrate no acute abnormality. SOFT TISSUES/SKULL:  No acute abnormality of the visualized skull or soft tissues. Cervical spine: BONES/ALIGNMENT: There is no acute fracture or traumatic malalignment. DEGENERATIVE CHANGES: Mild to moderate multilevel cervical spondylosis, most prominent C5-C6. SOFT TISSUES: Unremarkable prevertebral soft tissues.   Ill-defined thyroid demonstrate no acute abnormality. SINUSES: The visualized paranasal sinuses and mastoid air cells demonstrate no acute abnormality. SOFT TISSUES/SKULL:  No acute abnormality of the visualized skull or soft tissues. Cervical spine: BONES/ALIGNMENT: There is no acute fracture or traumatic malalignment. DEGENERATIVE CHANGES: Mild to moderate multilevel cervical spondylosis, most prominent C5-C6. SOFT TISSUES: Unremarkable prevertebral soft tissues. Ill-defined thyroid nodules noted measuring up to 1.1 cm in the right lobe. Vascular calcification is seen. No apical pneumothorax. Head CT: Somewhat degraded study by the patient's motion. No definite acute intracranial finding within the limitations of the study. Cervical spine: No acute fracture or subluxation. Xr Chest Portable    Result Date: 10/1/2020  EXAMINATION: ONE XRAY VIEW OF THE CHEST 10/1/2020 1:19 am COMPARISON: 05/18/2009 HISTORY: ORDERING SYSTEM PROVIDED HISTORY: right sided chest pain TECHNOLOGIST PROVIDED HISTORY: Reason for exam:->right sided chest pain Reason for Exam: right sided chest pain Acuity: Acute Type of Exam: Initial Mechanism of Injury: patient fell Relevant Medical/Surgical History: previous CHF and hypertension FINDINGS: There is a dual lead ICD on the left. Cardiac size at the upper limits of normal.  COPD. Elevated right hemidiaphragm. Calcified lymph nodes related to remote granulomatous process. No pneumothorax is identified. No acute osseous abnormality is identified. Perihilar atelectatic change. Perihilar atelectasis with elevated right hemidiaphragm. Assessment and Plan:  Principal Problem:    Elevated INR -Established problem. Improving. INR now around 2  Plan: continue on coumadin dosing per pharmacy. To continue followup with anticoagulation clinic  Active Problems:    Atrial fibrillation -Established problem. Stable. In sinus now  Plan: Continue present orders/plan.      Depression -Established problem. Stable. Mood ok  Plan: Continue present orders/plan. Hemiparesis affecting left side as late effect of cerebrovascular accident [facility])  Plan: SNF recommended    Hyperlipidemia  Plan: Continue present orders/plan.      Disp - to SNF when set up            Jose Lopez  10/6/2020

## 2020-10-07 NOTE — PROGRESS NOTES
Introduced self to patient. Initial PM assessment complete see flow sheet, VSS, A&Ox 4. Fall precautions in place. Bed alarm in place. No needs voiced at this time. POC discussed, pt verbalized understanding. Call light within reach, will continue to monitor.

## 2020-10-07 NOTE — PROGRESS NOTES
Occupational Therapy  Facility/Department: [facility] 3A NURSING  Daily Treatment Note  NAME: Michael Lema  :   MRN: [id number]    Date of Service: 10/7/2020    Discharge Recommendations:  Michael Lema scored a 15/24 on the AM-PAC ADL Inpatient form. Current research shows that an AM-PAC score of 17 or less is typically not associated with a discharge to the patient's home setting. Based on the patient's AM-PAC score and their current ADL deficits, it is recommended that the patient have 3-5 sessions per week of Occupational Therapy at d/c to increase the patient's independence. Please see assessment section for further patient specific details. If patient discharges prior to next session this note will serve as a discharge summary. Please see below for the latest assessment towards goals. OT Equipment Recommendations  Equipment Needed: No    Assessment   Performance deficits / Impairments: Decreased functional mobility ; Decreased ADL status; Decreased high-level IADLs;Decreased safe awareness;Decreased balance;Decreased endurance;Decreased cognition  Assessment: Pt is not at her baseline level of occupational function, based on the above deficits and would benefit from continued skilled acute OT services to address these deficits. Treatment Diagnosis: decreased activity tolerance due to late affects of left hemiplegia  Prognosis: Good  Assistance / Modification: rw  OT Education: OT Role;Plan of Care;Transfer Training  Patient Education: discharge; importance of being OOB. Pt verbalized understanding,  requires reinforcement  Barriers to Learning: decreased self awareness; cognition  REQUIRES OT FOLLOW UP: Yes  Activity Tolerance  Activity Tolerance: Patient limited by fatigue;Treatment limited secondary to medical complications (free text)  Activity Tolerance: Pt tolerated treatment but participation and performance limited by fatigue. SPOI2 in 90s.  BP supine prior to session: 100/69, 81/66 after transfer to chair, 78/61 2nd reading after chair t/f, back to supine 85/65, 2nd reading 81/56. Pt did not report lightheadedness, just fatigue & HA of 8/10. Reported to RN. Safety Devices  Safety Devices in place: Yes  Type of devices: All fall risk precautions in place;Call light within reach; Bed alarm in place;Nurse notified; Left in bed;Gait belt  Restraints  Initially in place: No         Patient Diagnosis(es): The primary encounter diagnosis was Fall, initial encounter. Diagnoses of General weakness and Supratherapeutic INR were also pertinent to this visit. has a past medical history of CHF (congestive heart failure) ([address] CVA (cerebral infarction), History of verrucae (wart) excision, Hyperlipidemia, Hypertension, Leg pain, Obstructive apnea, Unspecified cerebral artery occlusion with cerebral infarction, and Valvular disease. has a past surgical history that includes Cardiac defibrillator placement; Abscess Drainage; Dental surgery; and pacemaker placement. Restrictions  Restrictions/Precautions  Restrictions/Precautions: Fall Risk, General Precautions(high fall risk; Purewick)  Required Braces or Orthoses?: No(has L LE brace (AFO?) but does not wear, \"It's too hard to get on\")  Position Activity Restriction  Other position/activity restrictions: The pt was admitted with an elevated INR and frequent falls. Subjective   General  Chart Reviewed: Yes  Response to previous treatment: Patient with no complaints from previous session  Family / Caregiver Present: Yes(Son, Matt)  Diagnosis: left hemiplegia, frequent falls at home  Subjective  Subjective: Pt supine in bed, agreeable to OT tx with encouragement for bathing. \"I bathed yesterday. \" Pt denied pain.   Pre Treatment Pain Screening  Intervention List: Patient able to continue with treatment  Vital Signs  BP: (!) 81/56  BP Location: Right upper arm  BP Upper/Lower: Upper  Patient Currently in Pain: Denies   Orientation     Objective Appropriate responses to stimuli  Following Commands: Follows one step commands consistently; Follows one step commands with increased time  Attention Span: Attends with cues to redirect  Memory: Decreased short term memory  Safety Judgement: Decreased awareness of need for safety;Decreased awareness of need for assistance  Problem Solving: Decreased awareness of errors;Assistance required to generate solutions;Assistance required to implement solutions;Assistance required to identify errors made;Assistance required to correct errors made  Insights: Decreased awareness of deficits  Initiation: Does not require cues  Sequencing: Requires cues for some     Perception  Overall Perceptual Status: Impaired  Unilateral Attention: Appears intact(pt not observed to have inattention to L side this date; pt able to get comb from L side standing at sink to comb hair, pt required no cues to comb L side of hair or wash L side of face)  Initiation: Appears intact  Motor Planning: Appears intact                                   Plan   Plan  Times per week: 3-5  Times per day: Daily  Current Treatment Recommendations: Self-Care / ADL, Safety Education & Training, Patient/Caregiver Education & Training, Balance Training, Functional Mobility Training, Cognitive Reorientation, Endurance Training    AM-PAC Score        [facility] Inpatient Daily Activity Raw Score: 15 (10/07/20 1202)  AM-PAC Inpatient ADL T-Scale Score : 34.69 (10/07/20 1202)  ADL Inpatient CMS 0-100% Score: 56.46 (10/07/20 1202)  ADL Inpatient CMS G-Code Modifier : CK (10/07/20 1202)    Goals  Short term goals  Short term goal 1: min assist functional transfers -- Min A/CGA 10/7  Short term goal 2: min assist dressing -- SBA socks; declined pants 10/7  Short term goal 3: min assist bathing -- Min A UB bathing, SBA LB bathing, thighs to ankles only 10/7  Short term goal 4: min assist toileting -- not addressed, Lelon Damaso in place 10/7  Patient Goals   Patient goals :

## 2020-10-07 NOTE — CARE COORDINATION
Left massage and call back number  to [facility] admissions, regarding the precert status.

## 2020-10-08 NOTE — PROGRESS NOTES
Pharmacy to Dose Warfarin    Pharmacy consulted to dose warfarin for Afib. INR Goal: 2-3    INR today: 2.72    Assessment/Plan:  - INR therapeutic again today  - Will continue dose of warfarin 1.25 mg tonight    Pharmacy will continue to follow.     Merrill Benjamin, PharmD, [facility]  Clinical Pharmacist  [id number]

## 2020-10-08 NOTE — CARE COORDINATION
Discharge Plan:     Patient discharged to:  FACILITY: [facility]  PHONE: [phone]: [phone]    SW/DC Planner faxed, [address] and AVS   Narcotic Prescriptions faxed were:  n/a  RN: Laura Ugarte  will call report t       Medical Transport with: [facility]  [phone]   time: 6 pm  Family advised of discharge?:  Yes  - son  Betty Haq?:    Yes    All discharge needs met per case management.

## 2020-10-08 NOTE — PROGRESS NOTES
Progress Note - Dr. Kenneth Marx - Internal Medicine  PCP: Karson Mittal, [address] [facility] / [address] [phone]    Hospital Day: 7  Code Status: Full Code  Current Diet: DIET GENERAL;        CC: follow up on medical issues    Subjective:   Sergei Stark is a 66 y.o. female. She denies problems    No issues  Case d/w social work = we still are awaiting SNF precert   INR 6.88 this am  Pt has been medically stable for d/c for a few days now    She denies chest pain, denies shortness of breath, denies nausea,  denies emesis. 10 system Review of Systems is reviewed with patient, and pertinent positives are noted in HPI above . Otherwise, Review of systems is negative. I have reviewed the patient's medical and social history in detail and updated the computerized patient record. To recap: She  has a past medical history of CHF (congestive heart failure) ([address] CVA (cerebral infarction), History of verrucae (wart) excision, Hyperlipidemia, Hypertension, Leg pain, Obstructive apnea, Unspecified cerebral artery occlusion with cerebral infarction, and Valvular disease. . She  has a past surgical history that includes Cardiac defibrillator placement; Abscess Drainage; Dental surgery; and pacemaker placement. . She  reports that she quit smoking about 39 years ago. She has a 0.50 pack-year smoking history. She has never used smokeless tobacco. She reports that she does not drink alcohol or use drugs. .        Active Hospital Problems    Diagnosis Date Noted    Elevated INR [R79.1] 10/01/2020    Hyperlipidemia [E78.5] 10/28/2015    Hemiparesis affecting left side as late effect of cerebrovascular accident [facility]) [I69.354] 09/05/2014    Depression [F32.9] 01/24/2014    Atrial fibrillation [I48.91] 06/20/2011       Current Facility-Administered Medications: warfarin (COUMADIN) tablet 1.25 mg, 1.25 mg, Oral, Daily  bisacodyl (DULCOLAX) suppository 10 mg, 10 mg, Rectal, Daily PRN  lip balm petroleum free (PHYTOPLEX) stick, , Topical, PRN  aspirin EC tablet 81 mg, 81 mg, Oral, Daily  zolpidem (AMBIEN) tablet 5 mg, 5 mg, Oral, Nightly PRN  hydroCHLOROthiazide (HYDRODIURIL) tablet 25 mg, 25 mg, Oral, QAM  venlafaxine (EFFEXOR) tablet 75 mg, 75 mg, Oral, Daily  sacubitril-valsartan (ENTRESTO) 24-26 MG per tablet 1 tablet, 1 tablet, Oral, BID  digoxin (LANOXIN) tablet 125 mcg, 125 mcg, Oral, Daily  carvedilol (COREG) tablet 3.125 mg, 3.125 mg, Oral, BID WC  divalproex (DEPAKOTE ER) extended release tablet 250 mg, 250 mg, Oral, Daily  sodium chloride flush 0.9 % injection 10 mL, 10 mL, Intravenous, 2 times per day  sodium chloride flush 0.9 % injection 10 mL, 10 mL, Intravenous, PRN  potassium chloride (KLOR-CON M) extended release tablet 40 mEq, 40 mEq, Oral, PRN **OR** potassium bicarb-citric acid (EFFER-K) effervescent tablet 40 mEq, 40 mEq, Oral, PRN **OR** potassium chloride 10 mEq/100 mL IVPB (Peripheral Line), 10 mEq, Intravenous, PRN  acetaminophen (TYLENOL) tablet 650 mg, 650 mg, Oral, Q6H PRN **OR** acetaminophen (TYLENOL) suppository 650 mg, 650 mg, Rectal, Q6H PRN  magnesium hydroxide (MILK OF MAGNESIA) 400 MG/5ML suspension 30 mL, 30 mL, Oral, Daily PRN  promethazine (PHENERGAN) tablet 12.5 mg, 12.5 mg, Oral, Q6H PRN **OR** ondansetron (ZOFRAN) injection 4 mg, 4 mg, Intravenous, Q6H PRN  famotidine (PEPCID) tablet 20 mg, 20 mg, Oral, BID PRN  rosuvastatin (CRESTOR) tablet 5 mg, 5 mg, Oral, Nightly         Objective:  BP 99/65   Pulse 85   Temp 97.5 °F (36.4 °C) (Temporal)   Resp 16   Ht 5' 7\" (1.702 m)   Wt 177 lb 11.1 oz (80.6 kg)   SpO2 93%   BMI 27.83 kg/m²      Patient Vitals for the past 24 hrs:   BP Temp Temp src Pulse Resp SpO2   10/08/20 0830 99/65 97.5 °F (36.4 °C) Temporal 85 16 93 %   10/08/20 0357 106/71 97.4 °F (36.3 °C) Oral 76 16 92 %   10/08/20 0024 (!) 87/54 97.5 °F (36.4 °C) Oral 53 18 93 %   10/07/20 1645 103/72 97.7 °F (36.5 °C) Temporal 98 18 96 %   10/07/20 1213 115/81 98.1 °F (36.7 °C) Temporal 107 18 95 %   10/07/20 1155 (!) 81/56 -- -- -- -- --     Patient Vitals for the past 96 hrs (Last 3 readings):   Weight   10/07/20 0549 177 lb 11.1 oz (80.6 kg)   10/06/20 0736 178 lb 5 oz (80.9 kg)           Intake/Output Summary (Last 24 hours) at 10/8/2020 5124  Last data filed at 10/7/2020 1855  Gross per 24 hour   Intake 720 ml   Output 350 ml   Net 370 ml         Physical Exam:   BP 99/65   Pulse 85   Temp 97.5 °F (36.4 °C) (Temporal)   Resp 16   Ht 5' 7\" (1.702 m)   Wt 177 lb 11.1 oz (80.6 kg)   SpO2 93%   BMI 27.83 kg/m²   General appearance: alert, appears stated age and cooperative  Head: Normocephalic, without obvious abnormality, atraumatic  Lungs: clear to auscultation bilaterally  Heart: regular rate and rhythm, S1, S2 normal, no murmur, click, rub or gallop  Abdomen: soft, non-tender; bowel sounds normal; no masses,  no organomegaly  Extremities: extremities normal, atraumatic, no cyanosis or edema    Labs:  Lab Results   Component Value Date    WBC 12.2 (H) 10/08/2020    HGB 13.5 10/08/2020    HCT 39.8 10/08/2020     10/08/2020    CHOL 176 11/01/2019    TRIG 146 11/01/2019    HDL 64 (H) 11/01/2019    LDLDIRECT 116 (H) 04/24/2015    ALT 23 10/02/2020    AST 42 (H) 10/02/2020     (L) 10/08/2020    K 4.1 10/08/2020    CL 95 (L) 10/08/2020    CREATININE 0.7 10/08/2020    BUN 19 10/08/2020    CO2 29 10/08/2020    TSH 1.53 10/16/2013    INR 2.72 (H) 10/08/2020    LABMICR YES 10/01/2020     Lab Results   Component Value Date    TROPONINI <0.01 10/01/2020       Recent Imaging Results are Reviewed:  Ct Head Wo Contrast    Result Date: 9/30/2020  EXAMINATION: CT OF THE HEAD WITHOUT CONTRAST  9/30/2020 11:28 pm TECHNIQUE: CT of the head and cervical spine was performed without the administration of intravenous contrast. Dose modulation, iterative reconstruction, and/or weight based adjustment of the mA/kV was utilized to reduce the radiation dose to as low as reasonably limitations of the study. Cervical spine: No acute fracture or subluxation. Ct Cervical Spine Wo Contrast    Result Date: 9/30/2020  EXAMINATION: CT OF THE HEAD WITHOUT CONTRAST  9/30/2020 11:28 pm TECHNIQUE: CT of the head and cervical spine was performed without the administration of intravenous contrast. Dose modulation, iterative reconstruction, and/or weight based adjustment of the mA/kV was utilized to reduce the radiation dose to as low as reasonably achievable.; CT of the cervical spine was performed without the administration of intravenous contrast. Multiplanar reformatted images are provided for review. Dose modulation, iterative reconstruction, and/or weight based adjustment of the mA/kV was utilized to reduce the radiation dose to as low as reasonably achievable. COMPARISON: None. HISTORY: ORDERING SYSTEM PROVIDED HISTORY: fall, inr 13 TECHNOLOGIST PROVIDED HISTORY: If patient is on cardiac monitor and/or pulse ox, they may be taken off cardiac monitor and pulse ox, left on O2 if currently on. All monitors reattached when patient returns to room. Has a \"code stroke\" or \"stroke alert\" been called? ->No Reason for exam:->fall, inr 13 Reason for Exam: fall, inr 13. Acuity: Acute Type of Exam: Initial FINDINGS: Head: The study is somewhat degraded by the patient's motion. BRAIN/VENTRICLES: There is no definite acute intracranial hemorrhage, mass effect or midline shift. Large area of encephalomalacia involving the right frontal temporoparietal lobes, MCA territory. No abnormal extra-axial fluid collection. The gray-white differentiation is maintained without evidence of an acute infarct. There is no evidence of hydrocephalus. ORBITS: The visualized portion of the orbits demonstrate no acute abnormality. SINUSES: The visualized paranasal sinuses and mastoid air cells demonstrate no acute abnormality. SOFT TISSUES/SKULL:  No acute abnormality of the visualized skull or soft tissues.  Cervical spine: SNF when set up  Pt has been medically stable and discharged since 10/5  Unsure why there has been such a delay in pre-cert process          Cathy Kenny  10/8/2020

## 2020-10-08 NOTE — PROGRESS NOTES
Nutrition Assessment     Type and Reason for Visit: Initial(LOS assessment)    Nutrition Recommendations/Plan:   No nutrition needs at this time. Nutrition Assessment:  Pt's nutrition status is adequate. Recorded PO intakes 51-75%. Wt hx stable per EMR. No wounds present. Plan to discharge to SNF soon. Will follow at low risk unless nutrition status changes and RD consulted. Malnutrition Assessment:  Malnutrition Status: Insufficient data    Nutrition Related Findings: +3 liters. Active BS. Trace BLE edema. Current Nutrition Therapies:    DIET GENERAL; Anthropometric Measures:  · Height: 5' 7\" (170.2 cm)  · Current Body Wt: 177 lb (80.3 kg)   · BMI: 27.7    Nutrition Diagnosis:   No nutrition diagnosis at this time    Nutrition Interventions:   Food and/or Nutrient Delivery:  Continue Current Diet  Nutrition Education/Counseling:  Education not indicated   Coordination of Nutrition Care:  Continued Inpatient Monitoring    Goals:  Continued PO intake greater 50% at all meals.        Nutrition Monitoring and Evaluation:   Food/Nutrient Intake Outcomes:  Food and Nutrient Intake  Physical Signs/Symptoms Outcomes:  Weight     Discharge Planning:    No discharge needs at this time     Electronically signed by Andrew Flynn RD, RACHAEL on 10/8/20 at 10:53 AM EDT    Contact: [phone]

## 2020-10-08 NOTE — CARE COORDINATION
CM called Daphney- at [facility] to check on the status  the  precert   Daphney called back stating that she called the [employer] CM who told her that she has not heard from Lea kim yet. She will call Sarah for the determination and call back with updates. Will continue to follow.

## 2020-10-08 NOTE — PLAN OF CARE
Problem: Pain:  Goal: Pain level will decrease  Description: Pain level will decrease  Outcome: Met This Shift  Goal: Control of chronic pain  Description: Control of chronic pain  Outcome: Met This Shift  Note: Pt denies pain. Problem: Falls - Risk of:  Goal: Will remain free from falls  Description: Will remain free from falls  Outcome: Met This Shift  Note: Pt is wearing the fall bracelet and yellow nonskid socks. Bed is in lowest position, locked, side rails up 2/4, and call light is within reach. Pt informed of fall risks, verbalizes understanding, alarm is active, and agrees to ask for help to ambulate. Will monitor. Goal: Absence of physical injury  Description: Absence of physical injury  Outcome: Met This Shift     Problem: Skin Integrity:  Goal: Will show no infection signs and symptoms  Description: Will show no infection signs and symptoms  Outcome: Met This Shift  Goal: Absence of new skin breakdown  Description: Absence of new skin breakdown  Outcome: Met This Shift  Note: No breakdown noted on this shift. Turning pt q2h and PRN. Incontinent care being done PRN. Heels elevated off of bed. Will monitor.

## 2020-10-09 NOTE — PROGRESS NOTES
Data- discharge order received, pt or *** (appointed legal authority) verbalized agreement to discharge, disposition to [facility] *** #***, Joycelyn [place] reviewed and signed by physician. Action- AVS prepared, MÓNICA completed/ reported faxed by case management/. Discharge instruction summary: Diet- ***, Activity- ***, immunizations reviewed and ***, medications prescriptions to be filled at receiving facility except for the controlled prescriptions to be sent: ***, Transfer code status: Prior, LDAs to remain with discharge: ***. DME used: ***. Response- Bedside RN to call report to receiving facility. Pt belongings gathered, peripheral IV and cardiac monitoring removed. Disposition to Discharged {transports:[id number]} to {Disposition:[id number]} by {VBN:[id number]}, no complications reported.

## 2020-10-16 NOTE — TELEPHONE ENCOUNTER
[facility] She says she has faxed over pt BP and weight but has not been hearing back? Not sure if this has been seen by [facility]?   pls call to advise thank you

## 2020-10-26 NOTE — TELEPHONE ENCOUNTER
LAURE. Diana  Diana  Diana  Diana  Calling to let [facility] know that they put a magnet on this patients pacemaker . She is actively dying .